# Patient Record
Sex: FEMALE | Race: WHITE | NOT HISPANIC OR LATINO | Employment: FULL TIME | URBAN - METROPOLITAN AREA
[De-identification: names, ages, dates, MRNs, and addresses within clinical notes are randomized per-mention and may not be internally consistent; named-entity substitution may affect disease eponyms.]

---

## 2017-07-30 ENCOUNTER — APPOINTMENT (OUTPATIENT)
Dept: LAB | Facility: HOSPITAL | Age: 62
End: 2017-07-30
Payer: COMMERCIAL

## 2017-07-30 ENCOUNTER — TRANSCRIBE ORDERS (OUTPATIENT)
Dept: LAB | Facility: HOSPITAL | Age: 62
End: 2017-07-30

## 2017-07-30 DIAGNOSIS — Z00.8 HEALTH EXAMINATION IN POPULATION SURVEY: Primary | ICD-10-CM

## 2017-07-30 DIAGNOSIS — Z00.8 HEALTH EXAMINATION IN POPULATION SURVEY: ICD-10-CM

## 2017-07-30 LAB
CHOLEST SERPL-MCNC: 238 MG/DL (ref 50–200)
EST. AVERAGE GLUCOSE BLD GHB EST-MCNC: 126 MG/DL
HBA1C MFR BLD: 6 % (ref 4.2–6.3)
HDLC SERPL-MCNC: 81 MG/DL (ref 40–60)
LDLC SERPL CALC-MCNC: 147 MG/DL (ref 0–100)
TRIGL SERPL-MCNC: 51 MG/DL

## 2017-07-30 PROCEDURE — 83036 HEMOGLOBIN GLYCOSYLATED A1C: CPT

## 2017-07-30 PROCEDURE — 80061 LIPID PANEL: CPT

## 2017-07-30 PROCEDURE — 36415 COLL VENOUS BLD VENIPUNCTURE: CPT

## 2017-08-24 ENCOUNTER — ALLSCRIPTS OFFICE VISIT (OUTPATIENT)
Dept: OTHER | Facility: OTHER | Age: 62
End: 2017-08-24

## 2017-08-24 DIAGNOSIS — Z12.31 ENCOUNTER FOR SCREENING MAMMOGRAM FOR MALIGNANT NEOPLASM OF BREAST: ICD-10-CM

## 2017-08-24 DIAGNOSIS — R35.0 FREQUENCY OF MICTURITION: ICD-10-CM

## 2017-08-27 ENCOUNTER — TRANSCRIBE ORDERS (OUTPATIENT)
Dept: URGENT CARE | Age: 62
End: 2017-08-27

## 2017-08-27 ENCOUNTER — OFFICE VISIT (OUTPATIENT)
Dept: URGENT CARE | Age: 62
End: 2017-08-27
Payer: COMMERCIAL

## 2017-08-27 ENCOUNTER — APPOINTMENT (OUTPATIENT)
Dept: LAB | Age: 62
End: 2017-08-27
Payer: COMMERCIAL

## 2017-08-27 DIAGNOSIS — N30.01 ACUTE CYSTITIS WITH HEMATURIA: Primary | ICD-10-CM

## 2017-08-27 DIAGNOSIS — N30.01 ACUTE CYSTITIS WITH HEMATURIA: ICD-10-CM

## 2017-08-27 PROCEDURE — S9088 SERVICES PROVIDED IN URGENT: HCPCS | Performed by: FAMILY MEDICINE

## 2017-08-27 PROCEDURE — 99203 OFFICE O/P NEW LOW 30 MIN: CPT | Performed by: FAMILY MEDICINE

## 2017-08-27 PROCEDURE — 87086 URINE CULTURE/COLONY COUNT: CPT

## 2017-08-27 PROCEDURE — 81002 URINALYSIS NONAUTO W/O SCOPE: CPT | Performed by: FAMILY MEDICINE

## 2017-08-27 PROCEDURE — 87077 CULTURE AEROBIC IDENTIFY: CPT

## 2017-08-27 PROCEDURE — 87186 SC STD MICRODIL/AGAR DIL: CPT

## 2017-08-29 LAB — BACTERIA UR CULT: NORMAL

## 2017-08-31 ENCOUNTER — GENERIC CONVERSION - ENCOUNTER (OUTPATIENT)
Dept: OTHER | Facility: OTHER | Age: 62
End: 2017-08-31

## 2017-10-18 ENCOUNTER — GENERIC CONVERSION - ENCOUNTER (OUTPATIENT)
Dept: OTHER | Facility: OTHER | Age: 62
End: 2017-10-18

## 2017-10-31 ENCOUNTER — LAB REQUISITION (OUTPATIENT)
Dept: LAB | Facility: HOSPITAL | Age: 62
End: 2017-10-31
Payer: COMMERCIAL

## 2017-10-31 DIAGNOSIS — N39.0 URINARY TRACT INFECTION: ICD-10-CM

## 2017-10-31 PROCEDURE — 87086 URINE CULTURE/COLONY COUNT: CPT | Performed by: INTERNAL MEDICINE

## 2017-11-01 LAB — BACTERIA UR CULT: NORMAL

## 2017-11-28 ENCOUNTER — HOSPITAL ENCOUNTER (OUTPATIENT)
Dept: RADIOLOGY | Age: 62
Discharge: HOME | End: 2017-11-28
Payer: COMMERCIAL

## 2017-11-28 DIAGNOSIS — Z12.31 ENCOUNTER FOR SCREENING MAMMOGRAM FOR MALIGNANT NEOPLASM OF BREAST: ICD-10-CM

## 2017-11-28 PROCEDURE — G0202 SCR MAMMO BI INCL CAD: HCPCS

## 2018-01-13 NOTE — PROGRESS NOTES
Assessment    1  Lichen sclerosus (535 8) (L90 0)   2  Encounter for gynecological examination without abnormal finding (V72 31) (Z01 419)    Plan  Encounter for gynecological examination without abnormal finding    · Osphena 60 MG Oral Tablet; Take 1 tablet daily   Rx By: Alayna Silver; Dispense: 90 Days ; #:90 Tablet; Refill: 3; For: Encounter for gynecological examination without abnormal finding; DILCIA = N; Verified Transmission to [address]; Last Updated By: System [facility]; 8/24/2017 10:28:57 AM  Encounter for routine gynecological examination    · Estrace 0 1 MG/GM Vaginal Cream   Rx By: Alayna Silver; Dispense: 90 Days ; #:2 X 42 5 GM Tube; Refill: 3; For: Encounter for routine gynecological examination; DILCIA = N; Verified Transmission to [address]; Last Updated By: Reyes Rawls; 8/24/2017 9:54:41 AM  Encounter for screening mammogram for malignant neoplasm of breast    · * MAMMO SCREENING BILATERAL W CAD; Status:Active; Requested for:79Rio6448;    Perform:[facility]; PDH:[id number]; Ordered;  For:Encounter for screening mammogram for malignant neoplasm of breast; Ordered By:Mario Umanzor;  Lichen sclerosus    · Clobetasol Propionate 0 05 % External Ointment; APPLY AND GENTLY MASSAGE  INTO AFFECTED AREA(S) TWICE DAILY   Rx By: Alayna Silver; Dispense: 90 Days ; #:1 X 45 GM Tube; Refill: 0; For: Lichen sclerosus; DILCIA = N; Verified Transmission to [address]; Last Updated By: System [employer]; 8/24/2017 10:31:03 AM   · Follow-up visit in 3 weeks Evaluation and Treatment  Follow-up  Status: Complete  Done:  91OSG6584   Ordered; For: Lichen sclerosus; Ordered By: Alayna Silver Performed:  Due: 22LLQ0894; Last Updated By: Stephanie Mccoy; 8/25/2017 9:04:50 AM  Postmenopausal disorder    · Premarin 0 625 MG/GM Vaginal Cream   Rx By: Paramjit De Anda; Dispense: 1 Days ; #:1 GM; Refill: 0; For: Postmenopausal disorder; DILCIA = N; Dispense Sample;  Last Updated By: Viji Pham; 8/24/2017 9:55:07 AM   · Sertraline HCl - 50 MG Oral Tablet   Rx By: Nat Benson; Dispense: 90 Days ; #:90 Tablet; Refill: 1; For: Postmenopausal disorder; DILCIA = N; Verified Transmission to [facility]; Last Updated By: Viji Pham; 8/24/2017 9:55:10 AM    Discussion/Summary  healthy adult female the risks and benefits of cervical cancer screening were discussed cervical cancer screening is current Breast cancer screening: the risks and benefits of breast cancer screening were discussed, monthly self breast exam was advised and mammogram has been ordered  Colorectal cancer screening: the risks and benefits of colorectal cancer screening were discussed and colorectal cancer screening is current  Advice and education were given regarding weight bearing exercise, calcium supplements and vitamin D supplements  She will use clobetasol ointment BID x 3 weeks and will RTO in 3 weeks for recheck of 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  She was cautioned about the fact that lichen sclerosis increases the risk of vulvar cancer which needs to be screened for regularly  Chief Complaint  Pt is here for her yearly exam, she has no complaints  History of Present Illness  HPI: 65year old white female here for yearly exam, no complaints  She ran out of Sapna Sor about two weeks ago and can already feel the difference  She would like to restart immediately  She is postmenopausal and has had no bleeding  She is infrequently sexually active without discomfort while using Osphena  GYN HM, Adult Female  Gabrielle Blanche: The patient is being seen for a health maintenance evaluation  The last health maintenance visit was 1 year(s) ago  General Health: The patient's health since the last visit is described as good  Lifestyle:  She consumes a diverse and healthy diet  She has weight concerns  She exercises regularly  She exercises 3 or more times per week   She does not use tobacco  She consumes alcohol  She reports occasional alcohol use  She denies drug use  Reproductive health: the patient is postmenopausal   she is sexually active  pregnancy history: G 3P 2, 2(miscarriages: 1 )  Screening: Cervical cancer screening includes a pap smear performed 2015,neg  and human papilloma virus screening performed 2015,neg  Breast cancer screening includes a mammogram performed 2016, WNL  Colorectal cancer screening includes a colonoscopy performed within the past ten years  Metabolic screening includes DEXA performed gaby 10/30/2013, WNL  Active Problems    1  Bladder polyp (236 7) (D41 4)   2  Encounter for gynecological examination without abnormal finding (V72 31) (Z01 419)   3  Encounter for screening mammogram for malignant neoplasm of breast (V76 12)   (Z12 31)   4  Postmenopausal disorder (627 9) (N95 9)   5  Screening for human papillomavirus (HPV) (V73 81) (Z11 51)   6   Sore throat (462) (J02 9)    Past Medical History    · Acute pharyngitis (462) (J02 9)   · History of Anxiety (300 00) (F41 9)   · History of Asymptomatic menopausal state (V49 81) (Z78 0)   · History of Esophagitis, reflux (530 11) (K21 0)   · History of Previous Pregnancies Resulted In 2  Living Children   · History of Previously Pregnant With 2  Normal Vaginal Deliveries   · History of Summary Of Previous Pregnancies  2  (Total No )    Surgical History    · History of Bladder Surgery   · History of Cholecystectomy   · History of Diagnostic Cystoscopy   · History of Neuroplasty Median Nerve At Carpal Tunnel   · History of Oral Surgery Tooth Extraction   · History of Ovarian Cystectomy Left   · History of Tubal Ligation    Family History  Mother    · Family history of Cervical Cancer   · Family history of Heart Disease (V17 49)  Father    · Family history of Heart Disease (V17 49)    Social History    · Being A Social Drinker   · Currently sexually active   · Daily caffeinated coffee consumption   · Exercises strenuously less than 3 times a week   ·    · Never A Smoker   · No drug use    Current Meds   1  Estrace 0 1 MG/GM Vaginal Cream; INSERT 1/4 APPLICATORFUL (1GM) VAGINALLY   TWICE WEEKLY; Therapy: 77Qlb2693 to (Evaluate:26Jun2016)  Requested for: 51KEH1267; Last   Rx:66Nzd5544 Ordered   2  Multi-Vitamin Oral Tablet; Therapy: (Recorded:72Ygp3046) to Recorded   3  Osphena 60 MG Oral Tablet; Take 1 tablet daily; Therapy: 20FZZ4466 to (Evaluate:65Kfy4243)  Requested for: 96KYX4069; Last   Rx:47Tfo0835 Ordered   4  Pepcid TABS; Therapy: (Recorded:30May2013) to Recorded   5  Premarin 0 625 MG/GM Vaginal Cream; INSERT 1 GRAM INTRAVAGINALLY DAILY; Therapy: 69Xck7641 to (Evaluate:59Pym2976); Last Rx:73Njv0902 Ordered   6  Sertraline HCl - 50 MG Oral Tablet; TAKE 1 TABLET DAILY AS DIRECTED; Therapy: 71CVS9328 to (Evaluate:81Zso3529)  Requested for: 99CYH9002; Last   Rx:14Mar2016 Ordered    Allergies    1  Levaquin TABS    Vitals   Recorded: 45ZGG3196 32:95LE   Systolic 534, LUE, Sitting   Diastolic 78, LUE, Sitting   Height 5 ft 3 5 in   Weight 166 lb    BMI Calculated 28 94   BSA Calculated 1 8   LMP      Physical Exam    Constitutional   General appearance: No acute distress, well appearing and well nourished  Neck   Neck: Normal, supple, trachea midline, no masses  Genitourinary   External genitalia: Abnormal   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; clitoris is nearly completely obliterated  There is a patch of leukoplakia above the clitoris at the junction of the labia majora anteriorly  There is a large plaque of leukoplakia at the posterior introitus and this extends to the perianal area  There is a small fissure at the posterior introitus  Vagina: Normal, no lesions or dryness appreciated  Urethra: Normal     Urethral meatus: Normal     Bladder: Normal, soft, non-tender and no prolapse or masses appreciated      Cervix: Normal, no palpable masses  Uterus: Normal, non-tender, not enlarged, and no palpable masses  Adnexa/parametria: Normal, non-tender and no fullness or masses appreciated  Chest   Breasts: Normal and no dimpling or skin changes noted  Abdomen   Abdomen: Normal, non-tender, and no organomegaly noted  Lymphatic   Palpation of lymph nodes in neck, axillae, groin and/or other locations: No lymphadenopathy or masses noted  Results/Data  (1) HEMOGLOBIN A1C 92ZIT8513 10:19AM [employer]     Test Name Result Flag Reference   HEMOGLOBIN A1C 6 0 %  4 2-6 3   EST  AVG  GLUCOSE 126 mg/dl       (1) LIPID PANEL, FASTING 41UDW8328 10:19AM [employer]     Test Name Result Flag Reference   CHOLESTEROL 238 mg/dL H    HDL,DIRECT 81 mg/dL H 40-60   Specimen collection should occur prior to Metamizole administration due to the potential for falsely depressed results  LDL CHOLESTEROL CALCULATED 147 mg/dL H 0-100   This is a fasting blood test  Water,black tea or black  coffee only after 9:00pm the night before test  Drink 2 glasses of water the morning of test         Triglyceride:         Normal              <150 mg/dl       Borderline High    150-199 mg/dl       High               200-499 mg/dl       Very High          >499 mg/dl  Cholesterol:         Desirable        <200 mg/dl      Borderline High  200-239 mg/dl      High             >239 mg/dl  HDL Cholesterol:        High    >59 mg/dL      Low     <41 mg/dL  LDL CALCULATED:    This screening LDL is a calculated result  It does not have the accuracy of the Direct Measured LDL in the monitoring of patients with hyperlipidemia and/or statin therapy  Direct Measure LDL (NBI139) must be ordered separately in these patients  TRIGLYCERIDES 51 mg/dL  <=150   Specimen collection should occur prior to N-Acetylcysteine or Metamizole administration due to the potential for falsely depressed results         Future Appointments    Date/Time Provider Specialty Site 09/12/2017 08:20 AM Alayna Silver [facility]etric[facility]/Gynecology [facility]     Signatures   Electronically signed by : Edd Tee [facility];  Aug 24 2017 12:44PM EST                       (Author)    Electronically signed by : ELIZ Mckoy ; Aug 25 2017  2:44PM EST                       (Author)

## 2018-01-13 NOTE — MISCELLANEOUS
Message   Recorded as Task   Date: 07/21/2016 09:28 AM, Created By: Carlita Gan   Task Name: Call Back   Assigned To: Dion Liz   Regarding Patient: Nasreen Guevara, Status: Active   Comment:    Chelsi gM - 21 Jul 2016 9:28 AM     TASK CREATED  PT CALLED STATING THAT SHE RECIEVED A LETTER THAT HER OSPHENA WAS DENIED  SHE TRIED ESTRACE WITH NO RELIEF  SHE USES [facility]  HER # [phone]   Winsome  - 21 Jul 2016 9:29 AM     TASK REASSIGNED: Previously Assigned To Don Aragon - 21 Jul 2016 9:33 AM     TASK EDITED   [place]  - 21 Jul 2016 9:38 AM     TASK EDITED                 told pt what denial letter states she will try premarin, then she wants us to resubmit for approval of osphena        Active Problems    1  Bladder polyp (236 7) (D41 4)   2  Encounter for gynecological examination without abnormal finding (V72 31) (Z01 419)   3  Encounter for screening mammogram for malignant neoplasm of breast (V76 12)   (Z12 31)   4  Postmenopausal disorder (627 9) (N95 9)   5  Screening for human papillomavirus (HPV) (V73 81) (Z11 51)    Current Meds   1  Estrace 0 1 MG/GM Vaginal Cream; INSERT 1/4 APPLICATORFUL (1GM) VAGINALLY   TWICE WEEKLY; Therapy: 06Ffl0136 to (Evaluate:26Jun2016)  Requested for: 40YLJ4587; Last   Rx:62Hth4374 Ordered   2  Multi-Vitamin Oral Tablet; Therapy: (Recorded:17Etu3015) to Recorded   3  Osphena 60 MG Oral Tablet; Take 1 tablet daily; Therapy: 06LNV8414 to (Evaluate:65Okx3958)  Requested for: 86RYY1885; Last   Rx:86Qtk9173 Ordered   4  Pepcid TABS (Famotidine); Therapy: (Recorded:99Jtd6208) to Recorded   5  Sertraline HCl - 50 MG Oral Tablet; TAKE 1 TABLET DAILY AS DIRECTED; Therapy: 48EUD2434 to (Evaluate:44Iqo4780)  Requested for: 52ZME4462; Last   Rx:07Gpd3993 Ordered    Allergies    1   Levaquin TABS    Signatures   Electronically signed by : Jenn Wen, ; Jul 21 2016  9:40AM EST                       (Author)

## 2018-01-14 VITALS
HEIGHT: 64 IN | WEIGHT: 166 LBS | SYSTOLIC BLOOD PRESSURE: 118 MMHG | BODY MASS INDEX: 28.34 KG/M2 | DIASTOLIC BLOOD PRESSURE: 78 MMHG

## 2018-01-15 NOTE — MISCELLANEOUS
Message   Recorded as Task   Date: 08/30/2017 09:44 AM, Created By: Romina River   Task Name: Go to Result   Assigned To: Go Iglesias   Regarding Patient: Sabina Agent, Status: In Progress   Comment:    JuanpabloMarva garcia - 30 Aug 2017 9:44 AM     TASK CREATED  Please let Tracey Chaney know that her urine culture just came back  It shows E  coli which is resistant to only one antibiotic - Bactrim - which is the antibiotic that urgent care placed her on  Please have her change her antibiotic to Macrobid 100mg po BID x 7 days  She was rather unhappy when I talked her with her on Monday, feeling that my student or myself caused her UTI because this occurred later in the day after her pelvic exam - this amount of bacteria could not have possibly collected in a few hours after an exam, so I still believe that her exam did not cause this UTI  Anthony Gagnon - 30 Aug 2017 9:51 AM     TASK IN PROGRESS   Anthony Gagnon - 30 Aug 2017 9:56 AM     TASK EDITED  lmtcb   Anthony Gagnon - 31 Aug 2017 7:12 AM     TASK EDITED  Pt went to pcp yesterday and he put her on Keflex  Pt fine with that        Active Problems    1  Bladder polyp (236 7) (D41 4)   2  Encounter for gynecological examination without abnormal finding (V72 31) (Z01 419)   3  Encounter for screening mammogram for malignant neoplasm of breast (V76 12)   (Z12 31)   4  Lichen sclerosus (574 4) (L90 0)   5  Postmenopausal disorder (627 9) (N95 9)   6  Screening for human papillomavirus (HPV) (V73 81) (Z11 51)   7  Sore throat (462) (J02 9)   8  Urinary tract infection (599 0) (N39 0)    Current Meds   1  Clobetasol Propionate 0 05 % External Ointment; APPLY AND GENTLY MASSAGE INTO   AFFECTED AREA(S) TWICE DAILY; Therapy: 38Ppm2171 to (Evaluate:22Nov2017)  Requested for: 28Bxh8254; Last   Rx:20Rid8117 Ordered   2  Multi-Vitamin Oral Tablet; Therapy: (Recorded:39Ahh1871) to Recorded   3  Osphena 60 MG Oral Tablet; Take 1 tablet daily;    Therapy: 94ZQR3962 to (Evaluate:42Lwg5453)  Requested for: 66Iaq9249; Last   Rx:92Crn6022 Ordered   4  Pepcid TABS (Famotidine); Therapy: (Recorded:41Wff0767) to Recorded   5  Pyridium 100 MG Oral Tablet; TAKE 1 TABLET 3 TIMES DAILY AFTER MEALS; Therapy: 21Ayu9742 to (Evaluate:16Tdf3327)  Requested for: 86Idm2611; Last   Rx:28Sjk8140 Ordered   6  Sulfamethoxazole-Trimethoprim 800-160 MG Oral Tablet (Bactrim DS); TAKE 1 TABLET   TWICE DAILY WITH FOOD; Therapy: 37Ewg4854 to (Evaluate:43Ezx3237)  Requested for: 81Pbu0831; Last   Rx:33Rfn2408 Ordered    Allergies    1  Levaquin TABS    Signatures   Electronically signed by :  Janet Jha, ; Aug 31 2017  7:12AM EST                       (Author)

## 2018-01-16 NOTE — MISCELLANEOUS
Message   Recorded as Task   Date: 03/14/2016 10:22 AM, Created By: Maggy Duffy   Task Name: Med Renewal Request   Assigned To: Solomon Nickerson   Regarding Patient: Sylvain Villagomez, Status: In Progress   Sonya Butler - 14 Mar 2016 10:22 AM     TASK CREATED  Caller: Self; [phone] (Home); [phone] x,,,,, (Work)  pt needs a refill on zoloft  pharmacy is Edson Mcgregor - 14 Mar 2016 10:24 AM     TASK IN PROGRESS   Homa Rose - 14 Mar 2016 10:55 AM     TASK EDITED  rx to ehr until 07/2016 apt        Active Problems    1  Encounter for routine gynecological examination (V72 31) (Z01 419)   2  Encounter for screening mammogram for malignant neoplasm of breast (V76 12)   (Z12 31)   3  Postmenopausal disorder (627 9) (N95 9)   4  Screening for human papillomavirus (HPV) (V73 81) (Z11 51)    Current Meds   1  Estrace 0 1 MG/GM Vaginal Cream; INSERT 1/4 APPLICATORFUL (1GM) VAGINALLY   TWICE WEEKLY; Therapy: 76Dee1770 to (Evaluate:26Jun2016)  Requested for: 82MBT4583; Last   Rx:51Zaw7535 Ordered   2  Multi-Vitamin Oral Tablet; Therapy: (Recorded:02Ypp8612) to Recorded   3  Pepcid TABS (Famotidine); Therapy: (Recorded:13Wyl6407) to Recorded   4  Sertraline HCl - 50 MG Oral Tablet (Zoloft); Take 1 tablet daily; Therapy: 09Dxk1635 to (Evaluate:26Jun2016)  Requested for: 62IVR4948; Last   Rx:24Tqf9344 Ordered    Allergies    1   No Known Drug Allergies    Plan  Postmenopausal disorder    · Sertraline HCl - 50 MG Oral Tablet; TAKE 1 TABLET DAILY AS DIRECTED    Signatures   Electronically signed by : Abraham Bernardo, ; Mar 14 2016 10:56AM EST                       (Author)

## 2018-01-17 NOTE — MISCELLANEOUS
Message   Recorded as Task   Date: 08/19/2016 09:16 AM, Created By: Argelia Barroso   Task Name: Call Back   Assigned To: Dion Liz   Regarding Patient: Olena Gauthier, Status: Active   CommentHerjeovanny Robin - 19 Aug 2016 9:16 AM     TASK CREATED  Caller: Self; [phone] (Home); [phone] x,,,,, (Work)  pt called - she said she tried premarin and its not working  She said she was supposed to call back to try and see if osphena would be covered by her insurance  please advise [phone]   Domingo Luis - 19 Aug 2016 9:27 AM     TASK EDITED                 sent to cover my meds again for a prior Bessy Cordero - 19 Aug 2016 2:41 PM     TASK EDITED                 pt said she tried [place], estrace and premarin,asking for peer to peer review to get osphena approved for pt, they will call Luis Eduardo Singh - 19 Aug 2016 3:15 PM     TASK EDITED                 approved        Active Problems    1  Bladder polyp (236 7) (D41 4)   2  Encounter for gynecological examination without abnormal finding (V72 31) (Z01 419)   3  Encounter for screening mammogram for malignant neoplasm of breast (V76 12)   (Z12 31)   4  Postmenopausal disorder (627 9) (N95 9)   5  Screening for human papillomavirus (HPV) (V73 81) (Z11 51)    Current Meds   1  Estrace 0 1 MG/GM Vaginal Cream; INSERT 1/4 APPLICATORFUL (1GM) VAGINALLY   TWICE WEEKLY; Therapy: 73Hbt3906 to (Evaluate:26Jun2016)  Requested for: 61JUU9255; Last   Rx:44Hmu3797 Ordered   2  Multi-Vitamin Oral Tablet; Therapy: (Recorded:22Pwt4663) to Recorded   3  Osphena 60 MG Oral Tablet; Take 1 tablet daily; Therapy: 21BZE8448 to (Evaluate:55Bqj6578)  Requested for: 92VDF8065; Last   Rx:30Hvr5516 Ordered   4  Pepcid TABS (Famotidine); Therapy: (Recorded:10Fnl0563) to Recorded   5  Premarin 0 625 MG/GM Vaginal Cream; INSERT 1 GRAM INTRAVAGINALLY DAILY; Therapy: 46Tmf5588 to (Evaluate:72Gbf2055); Last Rx:58Rbt9497 Ordered   6   Sertraline HCl - 50 MG Oral Tablet; TAKE 1 TABLET DAILY AS DIRECTED; Therapy: 82MON8127 to (Evaluate:98Xuf5914)  Requested for: 33YNN1373; Last   Rx:14Mar2016 Ordered    Allergies    1   Levaquin TABS    Signatures   Electronically signed by : Julia Sweet, ; Aug 19 2016  3:15PM EST                       (Author)

## 2018-01-18 NOTE — MISCELLANEOUS
Message  Return to work or school:    She is able to return to work on  12/29/2016            Signatures   Electronically signed by : Leah Russell DO; Dec 26 2016 10:47AM EST                       (Author)

## 2018-01-25 DIAGNOSIS — F32.A DEPRESSION, UNSPECIFIED DEPRESSION TYPE: Primary | ICD-10-CM

## 2018-04-24 ENCOUNTER — TELEPHONE (OUTPATIENT)
Dept: BEHAVIORAL/MENTAL HEALTH CLINIC | Facility: CLINIC | Age: 63
End: 2018-04-24

## 2018-04-24 NOTE — TELEPHONE ENCOUNTER
Behavorial Health Outpatient Intake Questions    Referred by:  EMPLOYEE    Check with provider before scheduling    Are there any developmental disabilities? No    Does the patient have hearing impairment? No    Does the patient have ICM or CTT? No    Taking injectable psychiatric medications? NoIf yes, patient can not be seen here  Has the patient ever seen or currently see a psychiatrist? No If yes who/when? Has the patient ever seen or currently see a therapist? Yes If yes who/when? MARRIAGE COUNSELING 10 YRS AGO    How many visits did the pt have for previous psychiatric treatment?  History    Has the patient served in the [place] [address]? No    If yes, have you had combat services? No    Was the patient activated into federal active duty as a member of the national guard or reserve? No    Minor Child    Who has custody of the child? N/A    Is there a custody agreement? N/A    If there is a custody agreement remind parent that they must bring a copy to the first appt or they will not be seen  [facility]     Presenting Problem (in patient's words) MARRIAGE ISSUES, IS BIPOLAR,ANXIETY    Substance Abuse:No concerns of substance abuse are reported  Has the patient been seen here previously, either inpatient or outpatient? No outpatient    If seen as outpatient, what provider(s) did the patient see? N/A    A member of the patient's family has been in therapy here with NO    ACCEPTED as a patient Yes Appointment Date: 4/26/18 @ 2PM  [place]    Referred Elsewhere?  No    Primary Care Physician: Elise Hermosillo MD    PCP telephone number: [phone]    SUB: [facility]  INS: [address]  Id: [id number]   GRP:   [id number] [place] [id number] # [id number]

## 2018-05-03 ENCOUNTER — OFFICE VISIT (OUTPATIENT)
Dept: BEHAVIORAL/MENTAL HEALTH CLINIC | Facility: CLINIC | Age: 63
End: 2018-05-03
Payer: COMMERCIAL

## 2018-05-03 DIAGNOSIS — F41.9 ANXIETY: Primary | ICD-10-CM

## 2018-05-03 PROCEDURE — 90791 PSYCH DIAGNOSTIC EVALUATION: CPT | Performed by: SOCIAL WORKER

## 2018-05-03 NOTE — PSYCH
Assessment/Plan:      There are no diagnoses linked to this encounter  Subjective:      Patient ID: Lukas Putnam is a 59 y o  female  HPI: My  and I don't have intimacy- this is on me--I miss it, I gained weight, had surgery  I love him, he is a good father  I think he deserves that  I want to feel better about myself  Marriage is important to me  Pre-morbid level of function and History of Present Illness: Back on zoloft, 50 mgs  Feels like has anxiety- maybe not all of this is because of marriage   Previous Psychiatric/psychological treatment/year: 10 years ago saw marriage counselor- therapist had issues  Current Psychiatrist/Therapist: na  Outpatient and/or Partial and Other [employer] Used (CTT, ICM, VNA): na      Problem Assessment:     SOCIAL/VOCATION:  Family Constellation (include parents, relationship with each and pertinent Psych/Medical History):     No family history on file  Mother:   Very good, I was her caregiver, made many of the end of life decisions  Spouse: Maryanne Jim- pretty good at keeping himself in check, we work together, 9 yrs younger  She asked him to leave and go stay with his mom for the first time ever at [facility]'s suggestion  He sees Dr Swati Sandoval  Father:  when I was 6  This was pretty sad  He was a different dimitris  My mother's issues got the best of him  He was a drinker  Our house was full of chaos  Children: [address], 33, lives in [place], went to [place], hosptilized as a sophomore, manic, dx with Bipolar, mom not sure that fit  Not on meds  No other episods  Sibling:  has 3  Children: 25- Barbara- finishing msw, with dimitris for 6 yrs, plans to   Treated for adhd when younger  Also anxiety  Corine Landeros relates best to good friend, [employer], from work( and [employer])  she lives with   she does not live alone       Domestic Violence: na    Additional Comments related to family/relationships/peer support:  27 years   Mom bipolar- watched mom get ect in er in [facility]!!!!!!!!  man who is bipolar  Recently, not doing a good job managing his bipolar  Wants marital tx, start with her first    feels less able to deal than ever in past "where is savanah?"  She is getting away from herself  Who am I? 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 Sleeps and eats betterher primary language is [place]  Preferred language is [place]  Ethnic considerations are na  Religions affiliations and level of involvement comes from very Denominational family  Jehovah's witness- the split in her [employer] cause a void in her--first presby  People turned on each other    Does spirituality help you cope? No - not right now    FUNCTIONAL STATUS: There has been a recent change in Fritz Cohen ability to do the following: na    Level of Assistance Needed/By Whom?:hoa Barnesie Noel learns best by  na    SUBSTANCE ABUSE ASSESSMENT: no substance abuse    Substance/Route/Age/Amount/Frequency/Last Use: 2 glasses of wine per week    DETOX HISTORY: na    Previous detox/rehab treatment: na      HEALTH ASSESSMENT: LMP: na    LEGAL: No Mental Health Advance Directive or Power of  on file    Prenatal History: N/A    Delivery History: N/A    Developmental Milestones: N/A  Temperament as an infant was N/A  Temperament as a toddler was N/A  Temperament at school age was N/A  Temperament as a teenager was N/A      Risk Assessment:   The following ratings are based on my N/A    Risk of Harm to Self:   Demographic risk factors include   Historical Risk Factors include na  Recent Specific Risk Factors include na  Additional Factors for a Child or Adolescent na    Risk of Harm to Others:   Demographic Risk Factors include na  Historical Risk Factors include na  Recent Specific Risk Factors include na    Access to Weapons:   Zachary Woodson has access to the following weapons: na  The following steps have been taken to ensure weapons are properly secured: na    Based on the above information, the client presents the following risk of harm to self or others:  na    The following interventions are recommended:   no intervention changes    Notes regarding this Risk Assessment: na        Review Of Systems:     Mood Anxiety and Depression   Behavior Normal    Thought Content Normal   General Relationship Problems   Personality Normal   Other Psych Symptoms Normal   Constitutional Normal   ENT Normal   Cardiovascular Normal    Respiratory Normal    Gastrointestinal gall bladder removal   Genitourinary bladder surgery   Musculoskeletal Negative   Integumentary Normal    Neurological Normal    Endocrine Normal          Mental status:  Appearance calm and cooperative    Mood mood appropriate   Affect affect appropriate    Speech a normal rate   Thought Processes normal thought processes   Hallucinations no hallucinations present    Thought Content no delusions   Abnormal Thoughts no suicidal thoughts  and no homicidal thoughts    Orientation  oriented to person and place and time   Remote Memory short term memory intact and long term memory intact   Attention Span concentration intact   Intellect Appears to be Above Average Intelligence   Fund of Knowledge displays adequate knowledge of current events   Insight Insight intact   Judgement judgment was intact   Muscle Strength Normal gait    Language no difficulty naming common objects   Pain none   Pain Scale 0

## 2018-05-30 ENCOUNTER — OFFICE VISIT (OUTPATIENT)
Dept: BEHAVIORAL/MENTAL HEALTH CLINIC | Facility: CLINIC | Age: 63
End: 2018-05-30
Payer: COMMERCIAL

## 2018-05-30 DIAGNOSIS — F41.1 GAD (GENERALIZED ANXIETY DISORDER): ICD-10-CM

## 2018-05-30 PROCEDURE — 90834 PSYTX W PT 45 MINUTES: CPT | Performed by: SOCIAL WORKER

## 2018-05-30 NOTE — PSYCH
Psychotherapy Provided: Individual Psychotherapy 50 minutes     Length of time in session: 50 minutes, follow up in 2 week    Goals addressed in session: Goal 1, Goal 2 and Goal 3      Pain:      none    0    Current suicide risk : Low     D:  Met with Lucia Brian for Individual Therapy session  Treatment Plan goals were developed  Lucia Brian reported feeling much better since first session as feelings re: anxiety were normalized  A:  Lucia Brian appears to be looking forward to therapy sessions and addressing issues  P:Sessions will remain individual for now, marital to follow  Behavioral Health Treatment Plan [facility]: Diagnosis and Treatment Plan explained to Obed Lubin relates understanding diagnosis and is agreeable to Treatment Plan   Yes

## 2018-05-30 NOTE — PSYCH
Mattie Jmaes  1955       Date of Initial Treatment Plan: 5/30/18   Date of Current Treatment Plan: 05/30/18    Treatment Plan Number 1     Strengths/Personal Resources for Self Care: I am always even keeled in stressful situations, optimistic, humorous, pretty loyal, dependable, a good teacher, a good mentor, a good resource, a good friend, champion of the underdog! Diagnosis:   DAVID    Area of Needs: I have a void where my Buddhism and my spiritual community was  I have felt less appreciated by my adult children over the last few years  There is a void there  Long Term Goal 1: AI want to get back to Buddhism    Target Date:9/30/18  Completion Date: na         Short Term Objectives for Goal 1: AI will return to my old Buddhism to see if there is still a sense of community that I miss  Long Term Goal 2: I want to be healthier    Target Date: 9/30/18  Completion Date: N/A    Short Term Objectives for Goal 2: AI will work with my  to increase the intensidty of my workouts  Long Term Goal # 3: I want to improve my marriage     Target Date: 9/30/18  Completion Date: N/A    Short Term Objectives for Goal 3: AI will do one thing each day to let him know that I love him  GOAL 1: Modality: Individual 1x per month   Completion Date 9/30/18 and The person(s) responsible for carrying out the plan is  Demi    GOAL 2: Modality:  Individual 1x per month   Completion Date 9/30/18 and The person(s) responsible for carrying out the plan is  Demi    GOAL 3: Modality: Individual 1x per month   Completion Date 9/30/18 and The person(s) responsible for carrying out the plan is  [address] [place] [address] Avenue: Diagnosis and Treatment Plan explained to Alissa Ward relates understanding diagnosis and is agreeable to Treatment Plan         Client Comments : Please share your thoughts, feelings, need and/or experiences regarding your treatment plan: __________________________________________________________________    __________________________________________________________________    __________________________________________________________________    __________________________________________________________________    _______________________________________                Patient signature, Date Time: __________________________________________             Physician cosigner signature, Date, Time: ________________________________

## 2018-06-21 ENCOUNTER — OFFICE VISIT (OUTPATIENT)
Dept: BEHAVIORAL/MENTAL HEALTH CLINIC | Facility: CLINIC | Age: 63
End: 2018-06-21
Payer: COMMERCIAL

## 2018-06-21 DIAGNOSIS — F41.1 GAD (GENERALIZED ANXIETY DISORDER): ICD-10-CM

## 2018-06-21 PROCEDURE — 90834 PSYTX W PT 45 MINUTES: CPT | Performed by: SOCIAL WORKER

## 2018-06-21 NOTE — PSYCH
Psychotherapy Provided: Individual Psychotherapy 50 minutes     Length of time in session: 50 minutes, follow up in 2 week    Goals addressed in session: Goal 1, Goal 2 and Goal 3      Pain:      none    0    Current suicide risk : Low     D:  Met with Lorraine Swathiestella for Individual Therapy session  Lorraine Beltran spoke about her feelings re: her marriage of 27 years, her progress on her Treatment Plan goals since her last session  A:  Lorraine Beltran demonstrated considerable insight and has put effort into addressing her goals  She was receptive to this worker's support and feedback  P:Sessions will used to provide support and encourage continued growth  Behavioral Health Treatment Plan [facility]: Diagnosis and Treatment Plan explained to Jacob Deal relates understanding diagnosis and is agreeable to Treatment Plan   Yes

## 2018-07-03 ENCOUNTER — APPOINTMENT (OUTPATIENT)
Dept: LAB | Facility: HOSPITAL | Age: 63
End: 2018-07-03
Payer: COMMERCIAL

## 2018-07-03 ENCOUNTER — TRANSCRIBE ORDERS (OUTPATIENT)
Dept: LAB | Facility: HOSPITAL | Age: 63
End: 2018-07-03

## 2018-07-03 DIAGNOSIS — Z00.8 HEALTH EXAMINATION IN POPULATION SURVEY: Primary | ICD-10-CM

## 2018-07-03 LAB
CHOLEST SERPL-MCNC: 234 MG/DL (ref 50–200)
EST. AVERAGE GLUCOSE BLD GHB EST-MCNC: 126 MG/DL
HBA1C MFR BLD: 6 % (ref 4.2–6.3)
HDLC SERPL-MCNC: 69 MG/DL (ref 40–60)
LDLC SERPL CALC-MCNC: 147 MG/DL (ref 0–100)
NONHDLC SERPL-MCNC: 165 MG/DL
TRIGL SERPL-MCNC: 89 MG/DL

## 2018-07-03 PROCEDURE — 80061 LIPID PANEL: CPT

## 2018-07-03 PROCEDURE — 36415 COLL VENOUS BLD VENIPUNCTURE: CPT

## 2018-07-03 PROCEDURE — 83036 HEMOGLOBIN GLYCOSYLATED A1C: CPT

## 2018-08-14 ENCOUNTER — TELEPHONE (OUTPATIENT)
Dept: GASTROENTEROLOGY | Facility: CLINIC | Age: 63
End: 2018-08-14

## 2018-08-14 PROBLEM — Z12.11 COLON CANCER SCREENING: Status: ACTIVE | Noted: 2018-08-14

## 2018-08-14 NOTE — TELEPHONE ENCOUNTER
Karina Christianson  1955  [address] [place] [address]   [phone]  Cell Phone    Screened by: Gabino Scott ]    Referring Dr :Ritesh    Pre- Screening:   Has patient been referred for a routine screening Colonoscopy? yes  Is the patient over 48years of age? yes    If the answer is YES to both questions, proceed to the medical questions  Do you have any of the following symptoms? Have you had a coronary or vascular stent within the last year? no    Have you had a heart attack or stroke in the last 6 months? no    Have you had intestinal surgery in the last 3 months? no    Do you have problems with:    Do you use:  Oxygen no  CPAP/BiPAP no    Have you been hospitalized in the last Month? no    Have you been diagnosed with a bleeding disorder or anemia? no    Have you had chest pain (angina) or breathing problems  (COPD) in the last 3 months? no     Do you have any difficulty walking up a flight of stairs? no    Have you had Kidney failure or insufficiency? no    Have you had heart valve surgery? no    Are you confined to a wheelchair? no    Do you take     Do you take insulin for Diabetes no  Name of medication:    : If patient answers NO to medical questions, then schedule procedure  If patient answers YES to medical questions, then schedule office appointment  Previous Colonoscopy Yes   (if yes) Date and Facility of last colonoscopy? Patient scheduled for procedure:   Scheduled by:     Time:   Provider:   Location:     Insurance:   Referral Required? Were instructions Mailed? Were instructions sent to [employer]:   Was the prep sent to Pharmacy?    Comments: [  ]

## 2018-08-14 NOTE — TELEPHONE ENCOUNTER
ABBEY scheduled with Dr Miller at [place] on 10/17/2018  I gave pt verbal instructons/mailed  Pt requested Miralax/Dulcolax Prep      Per Jerald Francisco schedule her on 10/17/2018 in [place]

## 2018-08-16 ENCOUNTER — OFFICE VISIT (OUTPATIENT)
Dept: BEHAVIORAL/MENTAL HEALTH CLINIC | Facility: CLINIC | Age: 63
End: 2018-08-16
Payer: COMMERCIAL

## 2018-08-16 DIAGNOSIS — F41.1 GAD (GENERALIZED ANXIETY DISORDER): ICD-10-CM

## 2018-08-16 PROCEDURE — 90834 PSYTX W PT 45 MINUTES: CPT | Performed by: SOCIAL WORKER

## 2018-08-22 NOTE — PSYCH
Psychotherapy Provided: Individual Psychotherapy 50 minutes     Length of time in session: 50 minutes, follow up in 2 week    Goals addressed in session: Goal 1, Goal 2 and Goal 3      Pain:      none    0    Current suicide risk : Low     D:  Radha Bentley spoke today  Re: how much better she has been feeling her marriage of 27 years  She reported that she finds it very helpful to learn that her thoughts are the "norm" and not different from others  A:  Radha Bentley once again demonstrated considerable insight and has put ongoing  effort into addressing her goals  She remains receptive to this worker's support and feedback  P:Sessions will used to provide support and encourage continued growth  Behavioral Health Treatment Plan [facility]: Diagnosis and Treatment Plan explained to Vinnie Roth relates understanding diagnosis and is agreeable to Treatment Plan   Yes

## 2018-08-23 ENCOUNTER — LAB REQUISITION (OUTPATIENT)
Dept: LAB | Facility: HOSPITAL | Age: 63
End: 2018-08-23
Payer: COMMERCIAL

## 2018-08-23 DIAGNOSIS — B34.9 VIRAL INFECTION: ICD-10-CM

## 2018-08-23 LAB
FLUAV AG SPEC QL: NORMAL
FLUBV AG SPEC QL: NORMAL
RSV B RNA SPEC QL NAA+PROBE: NORMAL

## 2018-08-23 PROCEDURE — 87631 RESP VIRUS 3-5 TARGETS: CPT | Performed by: INTERNAL MEDICINE

## 2018-08-29 ENCOUNTER — TRANSCRIBE ORDERS (OUTPATIENT)
Dept: ADMINISTRATIVE | Age: 63
End: 2018-08-29

## 2018-08-29 ENCOUNTER — APPOINTMENT (OUTPATIENT)
Dept: RADIOLOGY | Age: 63
End: 2018-08-29
Payer: COMMERCIAL

## 2018-08-29 DIAGNOSIS — J04.10 CATARRHAL TRACHEITIS: Primary | ICD-10-CM

## 2018-08-29 DIAGNOSIS — J04.10 CATARRHAL TRACHEITIS: ICD-10-CM

## 2018-08-29 PROCEDURE — 71046 X-RAY EXAM CHEST 2 VIEWS: CPT

## 2018-10-15 ENCOUNTER — TRANSCRIBE ORDERS (OUTPATIENT)
Dept: ADMINISTRATIVE | Facility: HOSPITAL | Age: 63
End: 2018-10-15

## 2018-10-15 DIAGNOSIS — Z13.820 OSTEOPOROSIS SCREENING: ICD-10-CM

## 2018-10-15 DIAGNOSIS — Z00.01 ENCOUNTER FOR GENERAL ADULT MEDICAL EXAMINATION WITH ABNORMAL FINDINGS: Primary | ICD-10-CM

## 2018-10-16 ENCOUNTER — ANESTHESIA EVENT (OUTPATIENT)
Dept: GASTROENTEROLOGY | Facility: HOSPITAL | Age: 63
End: 2018-10-16
Payer: COMMERCIAL

## 2018-10-16 ENCOUNTER — APPOINTMENT (OUTPATIENT)
Dept: LAB | Facility: HOSPITAL | Age: 63
End: 2018-10-16
Attending: INTERNAL MEDICINE
Payer: COMMERCIAL

## 2018-10-16 DIAGNOSIS — Z00.01 ENCOUNTER FOR GENERAL ADULT MEDICAL EXAMINATION WITH ABNORMAL FINDINGS: ICD-10-CM

## 2018-10-16 DIAGNOSIS — Z13.820 OSTEOPOROSIS SCREENING: ICD-10-CM

## 2018-10-16 LAB
25(OH)D3 SERPL-MCNC: 17.3 NG/ML (ref 30–100)
ALBUMIN SERPL BCP-MCNC: 3.9 G/DL (ref 3.5–5)
ALP SERPL-CCNC: 90 U/L (ref 46–116)
ALT SERPL W P-5'-P-CCNC: 30 U/L (ref 12–78)
ANION GAP SERPL CALCULATED.3IONS-SCNC: 7 MMOL/L (ref 4–13)
AST SERPL W P-5'-P-CCNC: 25 U/L (ref 5–45)
BACTERIA UR QL AUTO: NORMAL /HPF
BASOPHILS # BLD AUTO: 0.06 THOUSANDS/ΜL (ref 0–0.1)
BASOPHILS NFR BLD AUTO: 1 % (ref 0–1)
BILIRUB SERPL-MCNC: 0.78 MG/DL (ref 0.2–1)
BILIRUB UR QL STRIP: NEGATIVE
BUN SERPL-MCNC: 16 MG/DL (ref 5–25)
CALCIUM SERPL-MCNC: 9.5 MG/DL (ref 8.3–10.1)
CHLORIDE SERPL-SCNC: 102 MMOL/L (ref 100–108)
CLARITY UR: CLEAR
CO2 SERPL-SCNC: 29 MMOL/L (ref 21–32)
COLOR UR: YELLOW
CREAT SERPL-MCNC: 0.87 MG/DL (ref 0.6–1.3)
EOSINOPHIL # BLD AUTO: 0.2 THOUSAND/ΜL (ref 0–0.61)
EOSINOPHIL NFR BLD AUTO: 3 % (ref 0–6)
ERYTHROCYTE [DISTWIDTH] IN BLOOD BY AUTOMATED COUNT: 13.5 % (ref 11.6–15.1)
GFR SERPL CREATININE-BSD FRML MDRD: 72 ML/MIN/1.73SQ M
GLUCOSE P FAST SERPL-MCNC: 81 MG/DL (ref 65–99)
GLUCOSE UR STRIP-MCNC: NEGATIVE MG/DL
HCT VFR BLD AUTO: 43 % (ref 34.8–46.1)
HGB BLD-MCNC: 14.2 G/DL (ref 11.5–15.4)
HGB UR QL STRIP.AUTO: NEGATIVE
HYALINE CASTS #/AREA URNS LPF: NORMAL /LPF
IMM GRANULOCYTES # BLD AUTO: 0.02 THOUSAND/UL (ref 0–0.2)
IMM GRANULOCYTES NFR BLD AUTO: 0 % (ref 0–2)
KETONES UR STRIP-MCNC: NEGATIVE MG/DL
LEUKOCYTE ESTERASE UR QL STRIP: NEGATIVE
LYMPHOCYTES # BLD AUTO: 2.25 THOUSANDS/ΜL (ref 0.6–4.47)
LYMPHOCYTES NFR BLD AUTO: 36 % (ref 14–44)
MCH RBC QN AUTO: 29.3 PG (ref 26.8–34.3)
MCHC RBC AUTO-ENTMCNC: 33 G/DL (ref 31.4–37.4)
MCV RBC AUTO: 89 FL (ref 82–98)
MONOCYTES # BLD AUTO: 0.31 THOUSAND/ΜL (ref 0.17–1.22)
MONOCYTES NFR BLD AUTO: 5 % (ref 4–12)
NEUTROPHILS # BLD AUTO: 3.37 THOUSANDS/ΜL (ref 1.85–7.62)
NEUTS SEG NFR BLD AUTO: 55 % (ref 43–75)
NITRITE UR QL STRIP: NEGATIVE
NON-SQ EPI CELLS URNS QL MICRO: NORMAL /HPF
NRBC BLD AUTO-RTO: 0 /100 WBCS
PH UR STRIP.AUTO: 5 [PH] (ref 4.5–8)
PLATELET # BLD AUTO: 295 THOUSANDS/UL (ref 149–390)
PMV BLD AUTO: 10.6 FL (ref 8.9–12.7)
POTASSIUM SERPL-SCNC: 4.2 MMOL/L (ref 3.5–5.3)
PROT SERPL-MCNC: 7.9 G/DL (ref 6.4–8.2)
PROT UR STRIP-MCNC: NEGATIVE MG/DL
RBC # BLD AUTO: 4.85 MILLION/UL (ref 3.81–5.12)
RBC #/AREA URNS AUTO: NORMAL /HPF
SODIUM SERPL-SCNC: 138 MMOL/L (ref 136–145)
SP GR UR STRIP.AUTO: 1 (ref 1–1.03)
TSH SERPL DL<=0.05 MIU/L-ACNC: 2.63 UIU/ML (ref 0.36–3.74)
UROBILINOGEN UR QL STRIP.AUTO: 0.2 E.U./DL
WBC # BLD AUTO: 6.21 THOUSAND/UL (ref 4.31–10.16)
WBC #/AREA URNS AUTO: NORMAL /HPF

## 2018-10-16 PROCEDURE — 36415 COLL VENOUS BLD VENIPUNCTURE: CPT

## 2018-10-16 PROCEDURE — 85025 COMPLETE CBC W/AUTO DIFF WBC: CPT

## 2018-10-16 PROCEDURE — 81001 URINALYSIS AUTO W/SCOPE: CPT | Performed by: INTERNAL MEDICINE

## 2018-10-16 PROCEDURE — 80053 COMPREHEN METABOLIC PANEL: CPT

## 2018-10-16 PROCEDURE — 82306 VITAMIN D 25 HYDROXY: CPT

## 2018-10-16 PROCEDURE — 84443 ASSAY THYROID STIM HORMONE: CPT

## 2018-10-16 NOTE — ANESTHESIA PREPROCEDURE EVALUATION
Review of Systems/Medical History  Patient summary reviewed  Chart reviewed  No history of anesthetic complications     Cardiovascular  Negative cardio ROS    Pulmonary  Negative pulmonary ROS        GI/Hepatic    GERD (Tums prn) ,        Negative  ROS        Endo/Other  Negative endo/other ROS      GYN  Negative gynecology ROS          Hematology  Negative hematology ROS      Musculoskeletal  Negative musculoskeletal ROS        Neurology  Negative neurology ROS      Psychology   Anxiety,              Physical Exam    Airway    Mallampati score: II  TM Distance: >3 FB       Dental   Comment: Upper front CAP,     Cardiovascular  Comment: Negative ROS, Rhythm: regular,     Pulmonary  Breath sounds clear to auscultation,     Other Findings        Anesthesia Plan  ASA Score- 2     Anesthesia Type- IV sedation with anesthesia with ASA Monitors  Additional Monitors:   Airway Plan:         Plan Factors-    Induction- intravenous  Postoperative Plan-     Informed Consent- Anesthetic plan and risks discussed with patient  I personally reviewed this patient with the CRNA  Discussed and agreed on the Anesthesia Plan with the CRNA  Gregoria Luis

## 2018-10-17 ENCOUNTER — ANESTHESIA (OUTPATIENT)
Dept: GASTROENTEROLOGY | Facility: HOSPITAL | Age: 63
End: 2018-10-17
Payer: COMMERCIAL

## 2018-10-17 ENCOUNTER — HOSPITAL ENCOUNTER (OUTPATIENT)
Facility: HOSPITAL | Age: 63
Setting detail: OUTPATIENT SURGERY
Discharge: HOME | End: 2018-10-17
Attending: INTERNAL MEDICINE | Admitting: INTERNAL MEDICINE
Payer: COMMERCIAL

## 2018-10-17 VITALS
DIASTOLIC BLOOD PRESSURE: 65 MMHG | RESPIRATION RATE: 20 BRPM | OXYGEN SATURATION: 99 % | TEMPERATURE: 97.5 F | HEART RATE: 55 BPM | WEIGHT: 162 LBS | BODY MASS INDEX: 27.66 KG/M2 | HEIGHT: 64 IN | SYSTOLIC BLOOD PRESSURE: 103 MMHG

## 2018-10-17 PROCEDURE — 45378 DIAGNOSTIC COLONOSCOPY: CPT | Performed by: INTERNAL MEDICINE

## 2018-10-17 RX ORDER — PROPOFOL 10 MG/ML
INJECTION, EMULSION INTRAVENOUS AS NEEDED
Status: DISCONTINUED | OUTPATIENT
Start: 2018-10-17 | End: 2018-10-17

## 2018-10-17 RX ORDER — PROPOFOL 10 MG/ML
INJECTION, EMULSION INTRAVENOUS CONTINUOUS PRN
Status: DISCONTINUED | OUTPATIENT
Start: 2018-10-17 | End: 2018-10-17 | Stop reason: SURG

## 2018-10-17 RX ORDER — TERBINAFINE HYDROCHLORIDE 250 MG/1
250 TABLET ORAL DAILY
COMMUNITY
End: 2019-01-09 | Stop reason: HOSPADM

## 2018-10-17 RX ORDER — PROPOFOL 10 MG/ML
INJECTION, EMULSION INTRAVENOUS AS NEEDED
Status: DISCONTINUED | OUTPATIENT
Start: 2018-10-17 | End: 2018-10-17 | Stop reason: SURG

## 2018-10-17 RX ORDER — SODIUM CHLORIDE 9 MG/ML
50 INJECTION, SOLUTION INTRAVENOUS CONTINUOUS
Status: DISCONTINUED | OUTPATIENT
Start: 2018-10-17 | End: 2018-10-17 | Stop reason: HOSPADM

## 2018-10-17 RX ADMIN — PROPOFOL 130 MG: 10 INJECTION, EMULSION INTRAVENOUS at 11:16

## 2018-10-17 RX ADMIN — PROPOFOL 100 MCG/KG/MIN: 10 INJECTION, EMULSION INTRAVENOUS at 11:16

## 2018-10-17 RX ADMIN — SODIUM CHLORIDE 50 ML/HR: 0.9 INJECTION, SOLUTION INTRAVENOUS at 10:33

## 2018-10-17 NOTE — DISCHARGE INSTR - AVS FIRST PAGE
OPERATIVE REPORT  PATIENT NAME: Peg Chase    :  1955  MRN: [id number]  Pt Location: [facility] ROOM 01    SURGERY DATE: 10/17/2018    Surgeon(s) and Role:     * Sean Munguia MD - Primary     * Meaghan Epstein MD - Fellow    COLONOSCOPY    PROCEDURE: Colonoscopy    INDICATIONS: Screening for Colon Cancer    POST-OP DIAGNOSIS: See the impression below    SEDATION: Monitored anesthesia care, check anesthesia records    PHYSICAL EXAM:    /67   Pulse 63   Temp 97 5 °F (36 4 °C) (Oral)   Resp 14   Ht 5' 4" (1 626 m)   Wt 73 5 kg (162 lb)   SpO2 99%   BMI 27 81 kg/m²    Body mass index is 27 81 kg/m²  General: NAD  Heart: S1 & S2 normal, RRR  Lungs: CTA, No rales or rhonchi  Abdomen: Soft, nontender, nondistended, good bowel sounds    CONSENT:  Informed consent was obtained for the procedure, including sedation after explaining the risks and benefits of the procedure  Risks including but not limited to bleeding, perforation, infection, aspiration were discussed in detail  Also explained about less than 100%$ sensitivity with the exam and other alternatives  PREPARATION:   EKG tracing, pulse oximetry, blood pressure were monitored throughout the procedure  Patient was identified by myself both verbally and by visual inspection of ID band  DESCRIPTION:   Patient was placed in the left lateral decubitus position and was sedated with the above medication  Digital rectal examination was performed  The colonoscope was introduced in to the anal canal and advanced up to transverse colon  The procedure was terminated due to poor prep and inadequate visualization; findings and interventions are described below  Appropriate photodocumentation was obtained  The quality of the colonic preparation was poor  FINDINGS:    Large amounts of solid and liquid stool found throughout the colon  Despite aggressive irrigation and adequate visualization therefore procedure was aborted  Poor prep  IMPRESSIONS:      Poor prep  Inadequate visualization of colonic mucosa  Large amounts of solid and liquid stool throughout the colon    RECOMMENDATIONS:  Clear liquid diet  Resume miralax and Doculax  Repeat colonoscopy tomorrow  COMPLICATIONS:  None; patient tolerated the procedure well      DISPOSITION: PACU           CONDITION: Stable

## 2018-10-17 NOTE — OP NOTE
OPERATIVE REPORT  PATIENT NAME: Peg Chase    :  1955  MRN: [id number]  Pt Location: [facility] ROOM 01    SURGERY DATE: 10/17/2018    Surgeon(s) and Role:     * Sean Munguia MD - Primary     * Meaghan Epstein MD - Fellow    COLONOSCOPY    PROCEDURE: Colonoscopy    INDICATIONS: Screening for Colon Cancer    POST-OP DIAGNOSIS: See the impression below    SEDATION: Monitored anesthesia care, check anesthesia records    PHYSICAL EXAM:    /67   Pulse 63   Temp 97 5 °F (36 4 °C) (Oral)   Resp 14   Ht 5' 4" (1 626 m)   Wt 73 5 kg (162 lb)   SpO2 99%   BMI 27 81 kg/m²   Body mass index is 27 81 kg/m²  General: NAD  Heart: S1 & S2 normal, RRR  Lungs: CTA, No rales or rhonchi  Abdomen: Soft, nontender, nondistended, good bowel sounds    CONSENT:  Informed consent was obtained for the procedure, including sedation after explaining the risks and benefits of the procedure  Risks including but not limited to bleeding, perforation, infection, aspiration were discussed in detail  Also explained about less than 100%$ sensitivity with the exam and other alternatives  PREPARATION:   EKG tracing, pulse oximetry, blood pressure were monitored throughout the procedure  Patient was identified by myself both verbally and by visual inspection of ID band  DESCRIPTION:   Patient was placed in the left lateral decubitus position and was sedated with the above medication  Digital rectal examination was performed  The colonoscope was introduced in to the anal canal and advanced up to transverse colon  The procedure was terminated due to poor prep and inadequate visualization; findings and interventions are described below  Appropriate photodocumentation was obtained  The quality of the colonic preparation was poor  FINDINGS:    Large amounts of solid and liquid stool found throughout the colon  Despite aggressive irrigation and adequate visualization therefore procedure was aborted  Poor prep  IMPRESSIONS:      Poor prep  Inadequate visualization of colonic mucosa  Large amounts of solid and liquid stool throughout the colon    RECOMMENDATIONS:  Clear liquid diet  Resume miralax and Doculax  Repeat colonoscopy tomorrow  COMPLICATIONS:  None; patient tolerated the procedure well      DISPOSITION: PACU           CONDITION: Stable

## 2018-10-17 NOTE — H&P
History and Physical - [facility]  Davina England 59 y o  female MRN: [id number]                  HPI: Davina England is a 59y o  year old female who presents for colonoscopy for colon cancer screening  Previous colonoscopy was 7/18/07 with Dr Charbel Hernandez at age 49 which was normal and was advised on a 10 year follow-up  Denies unintentional weight loss, blood in stool or change in bowel habits  REVIEW OF SYSTEMS: Per the HPI, and otherwise unremarkable  Historical Information   Past Medical History:   Diagnosis Date    Anxiety     GERD (gastroesophageal reflux disease)     takes tums occasionaly     Past Surgical History:   Procedure Laterality Date    CARPAL TUNNEL RELEASE Bilateral     CHOLECYSTECTOMY      COLONOSCOPY      CYSTOSCOPY      NH CMBND ANTERPOST COLPORRAPHY W/CYSTO N/A 5/23/2016    Procedure: COLPORRHAPHY ANTERIOR POSTERIOR WITH GRAFT; ENTEROCELE REPAIR;  Surgeon: Thierno Mckinney MD;  [facility]: [facility];  Service: UroGynecology           NH CYSTOURETHROSCOPY N/A 5/23/2016    Procedure: Christine Collins;  Surgeon: Thierno Mckinney MD;  [facility];  Service: UroGynecology           NH REVAGINAL PROLAPSE,SACROSP LIG N/A 5/23/2016    Procedure: COLPOPEXY VAGINAL EXTRAPERITONEAL (VEC); Surgeon: Thierno Mckinney MD;  [facility];  Service: UroGynecology           NH SLING OPER STRES INCONTINENCE N/A 5/23/2016    Procedure: INSERTION PUBOVAGINAL SLING ;  Surgeon: Thierno Mckinney MD;  [facility];  Service: UroGynecology           TUBAL LIGATION       Social History   History   Alcohol Use    0 6 oz/week    1 Cans of beer per week     Comment: weekly     History   Drug use: Unknown     History   Smoking Status    Never Smoker   Smokeless Tobacco    Never Used     History reviewed  No pertinent family history      Meds/Allergies     Prescriptions Prior to Admission   Medication    docusate sodium (COLACE) 100 mg capsule    sertraline (ZOLOFT) 50 mg tablet    terbinafine (LamISIL) 250 mg tablet       Allergies   Allergen Reactions    Levaquin [Levofloxacin] Swelling and Other (See Comments)     Fluid in knee    Quinolones Other (See Comments)     And levaquin       Objective     Blood pressure 113/67, pulse 63, temperature 97 5 °F (36 4 °C), temperature source Oral, resp  rate 14, height 5' 4" (1 626 m), weight 73 5 kg (162 lb), SpO2 99 %        PHYSICAL EXAM    Gen: NAD  CV: RRR  CHEST: Clear  ABD: soft, NT/ND  EXT: no edema      ASSESSMENT/PLAN:  This is a 59y o  year old female here for colonoscopy for colon cancer screening(10 year interval)    PLAN:   Procedure:   Colonoscopy

## 2018-10-18 ENCOUNTER — HOSPITAL ENCOUNTER (OUTPATIENT)
Facility: HOSPITAL | Age: 63
Setting detail: OUTPATIENT SURGERY
Discharge: HOME | End: 2018-10-18
Attending: INTERNAL MEDICINE | Admitting: INTERNAL MEDICINE
Payer: COMMERCIAL

## 2018-10-18 ENCOUNTER — ANESTHESIA EVENT (OUTPATIENT)
Dept: GASTROENTEROLOGY | Facility: HOSPITAL | Age: 63
End: 2018-10-18
Payer: COMMERCIAL

## 2018-10-18 ENCOUNTER — ANESTHESIA (OUTPATIENT)
Dept: GASTROENTEROLOGY | Facility: HOSPITAL | Age: 63
End: 2018-10-18
Payer: COMMERCIAL

## 2018-10-18 ENCOUNTER — OFFICE VISIT (OUTPATIENT)
Dept: BEHAVIORAL/MENTAL HEALTH CLINIC | Facility: CLINIC | Age: 63
End: 2018-10-18
Payer: COMMERCIAL

## 2018-10-18 VITALS
RESPIRATION RATE: 16 BRPM | HEART RATE: 70 BPM | OXYGEN SATURATION: 100 % | SYSTOLIC BLOOD PRESSURE: 90 MMHG | DIASTOLIC BLOOD PRESSURE: 58 MMHG | TEMPERATURE: 97.6 F

## 2018-10-18 DIAGNOSIS — F41.9 ANXIETY: Primary | ICD-10-CM

## 2018-10-18 PROCEDURE — 45385 COLONOSCOPY W/LESION REMOVAL: CPT | Performed by: INTERNAL MEDICINE

## 2018-10-18 PROCEDURE — 90834 PSYTX W PT 45 MINUTES: CPT | Performed by: SOCIAL WORKER

## 2018-10-18 PROCEDURE — 88305 TISSUE EXAM BY PATHOLOGIST: CPT | Performed by: PATHOLOGY

## 2018-10-18 RX ORDER — PROPOFOL 10 MG/ML
INJECTION, EMULSION INTRAVENOUS AS NEEDED
Status: DISCONTINUED | OUTPATIENT
Start: 2018-10-18 | End: 2018-10-18 | Stop reason: SURG

## 2018-10-18 RX ORDER — GLYCOPYRROLATE 0.2 MG/ML
INJECTION INTRAMUSCULAR; INTRAVENOUS AS NEEDED
Status: DISCONTINUED | OUTPATIENT
Start: 2018-10-18 | End: 2018-10-18 | Stop reason: SURG

## 2018-10-18 RX ORDER — LIDOCAINE HYDROCHLORIDE 10 MG/ML
0.5 INJECTION, SOLUTION INFILTRATION; PERINEURAL ONCE AS NEEDED
Status: CANCELLED | OUTPATIENT
Start: 2018-10-18

## 2018-10-18 RX ORDER — SODIUM CHLORIDE 9 MG/ML
50 INJECTION, SOLUTION INTRAVENOUS CONTINUOUS
Status: CANCELLED | OUTPATIENT
Start: 2018-10-18

## 2018-10-18 RX ORDER — SODIUM CHLORIDE 9 MG/ML
INJECTION, SOLUTION INTRAVENOUS CONTINUOUS PRN
Status: DISCONTINUED | OUTPATIENT
Start: 2018-10-18 | End: 2018-10-18 | Stop reason: SURG

## 2018-10-18 RX ADMIN — GLYCOPYRROLATE 0.2 MG: 0.2 INJECTION, SOLUTION INTRAMUSCULAR; INTRAVENOUS at 12:08

## 2018-10-18 RX ADMIN — SODIUM CHLORIDE: 0.9 INJECTION, SOLUTION INTRAVENOUS at 10:53

## 2018-10-18 RX ADMIN — PROPOFOL 25 MG: 10 INJECTION, EMULSION INTRAVENOUS at 12:08

## 2018-10-18 RX ADMIN — PROPOFOL 50 MG: 10 INJECTION, EMULSION INTRAVENOUS at 11:42

## 2018-10-18 RX ADMIN — PROPOFOL 25 MG: 10 INJECTION, EMULSION INTRAVENOUS at 12:20

## 2018-10-18 RX ADMIN — PROPOFOL 25 MG: 10 INJECTION, EMULSION INTRAVENOUS at 12:02

## 2018-10-18 RX ADMIN — PROPOFOL 25 MG: 10 INJECTION, EMULSION INTRAVENOUS at 11:47

## 2018-10-18 RX ADMIN — PROPOFOL 25 MG: 10 INJECTION, EMULSION INTRAVENOUS at 12:14

## 2018-10-18 RX ADMIN — PROPOFOL 25 MG: 10 INJECTION, EMULSION INTRAVENOUS at 11:53

## 2018-10-18 RX ADMIN — PROPOFOL 25 MG: 10 INJECTION, EMULSION INTRAVENOUS at 11:44

## 2018-10-18 RX ADMIN — PROPOFOL 25 MG: 10 INJECTION, EMULSION INTRAVENOUS at 11:59

## 2018-10-18 RX ADMIN — PROPOFOL 25 MG: 10 INJECTION, EMULSION INTRAVENOUS at 12:05

## 2018-10-18 RX ADMIN — PROPOFOL 50 MG: 10 INJECTION, EMULSION INTRAVENOUS at 11:41

## 2018-10-18 RX ADMIN — PROPOFOL 25 MG: 10 INJECTION, EMULSION INTRAVENOUS at 12:17

## 2018-10-18 RX ADMIN — PROPOFOL 25 MG: 10 INJECTION, EMULSION INTRAVENOUS at 12:23

## 2018-10-18 RX ADMIN — PROPOFOL 25 MG: 10 INJECTION, EMULSION INTRAVENOUS at 11:50

## 2018-10-18 RX ADMIN — PROPOFOL 25 MG: 10 INJECTION, EMULSION INTRAVENOUS at 12:11

## 2018-10-18 RX ADMIN — PROPOFOL 25 MG: 10 INJECTION, EMULSION INTRAVENOUS at 11:56

## 2018-10-18 NOTE — DISCHARGE INSTR - AVS FIRST PAGE
OPERATIVE REPORT  PATIENT NAME: Chuy Coburn    :  1955  MRN: [id number]  Pt Location: [facility] ROOM 01    SURGERY DATE: 10/18/2018    Surgeon(s) and Role:     Ligia Arciniega MD - Primary    Preop Diagnosis:  * No pre-op diagnosis entered *    * No Diagnosis Codes entered *    Procedure(s) (LRB):  COLONOSCOPY (N/A)    Specimen(s):  ID Type Source Tests Collected by Time Destination   1 : cecum polyp Tissue Polyp, Colorectal TISSUE EXAM Genna Hernandez MD 10/18/2018 1210        Colonoscopy Procedure Note    Procedure: Colonoscopy    Sedation: Monitored anesthesia care, check anesthesia records      ASA Class: 2    INDICATIONS: Screening for Colon Cancer    POST-OP DIAGNOSIS: See the impression below    Procedure Details     Prior colonoscopy: More than 10 years ago  Informed consent was obtained for the procedure, including sedation  Risks of perforation, hemorrhage, adverse drug reaction and aspiration were discussed  The patient was placed in the left lateral decubitus position  Based on the pre-procedure assessment, including review of the patient's medical history, medications, allergies, and review of systems, she had been deemed to be an appropriate candidate for conscious sedation; she was therefore sedated with the medications listed below  The patient was monitored continuously with telemetry, pulse oximetry, blood pressure monitoring, and direct observations  A rectal examination was performed  The colonoscope was inserted into the rectum and advanced under direct vision to the cecum, which was identified by the ileocecal valve and appendiceal orifice  The quality of the colonic preparation was good  A careful inspection was made as the colonoscope was withdrawn, including a retroflexed view of the rectum; findings and interventions are described below      Findings:  Cecal flat polyp 8 mm size, resected using hot snare, clip was used to prevent bleeding  Normal ascending colon  Normal transverse colon  Normal sigmoid colon  Normal rectum and anal canal           Complications:  None; patient tolerated the procedure well  Impression:    Cecal blood polyp 8 mm size resected    Recommendations:   Repeat colonoscopy based on pathology report    SIGNATURE: Malina Brandon MD  DATE: October 18, 2018  TIME: 12:36 PM      Attestation signed by Lulu Tukr MD at 10/18/2018  1:30 PM:  Lulu Turk MD was present throughout the entire procedure from insertion to complete withdrawal of the scope  I performed all interventions myself or oversaw the fellow       Lulu Turk MD

## 2018-10-18 NOTE — OP NOTE
OPERATIVE REPORT  PATIENT NAME: Memo Waldrop    :  1955  MRN: [id number]  Pt Location: [facility] ROOM 01    SURGERY DATE: 10/18/2018    Surgeon(s) and Role:     Corena Alpers, MD - Primary    Preop Diagnosis:  * No pre-op diagnosis entered *    * No Diagnosis Codes entered *    Procedure(s) (LRB):  COLONOSCOPY (N/A)    Specimen(s):  ID Type Source Tests Collected by Time Destination   1 : cecum polyp Tissue Polyp, Colorectal TISSUE EXAM Jaquelin Oliva MD 10/18/2018 1210        Colonoscopy Procedure Note    Procedure: Colonoscopy    Sedation: Monitored anesthesia care, check anesthesia records      ASA Class: 2    INDICATIONS: Screening for Colon Cancer    POST-OP DIAGNOSIS: See the impression below    Procedure Details     Prior colonoscopy: More than 10 years ago  Informed consent was obtained for the procedure, including sedation  Risks of perforation, hemorrhage, adverse drug reaction and aspiration were discussed  The patient was placed in the left lateral decubitus position  Based on the pre-procedure assessment, including review of the patient's medical history, medications, allergies, and review of systems, she had been deemed to be an appropriate candidate for conscious sedation; she was therefore sedated with the medications listed below  The patient was monitored continuously with telemetry, pulse oximetry, blood pressure monitoring, and direct observations  A rectal examination was performed  The colonoscope was inserted into the rectum and advanced under direct vision to the cecum, which was identified by the ileocecal valve and appendiceal orifice  The quality of the colonic preparation was good  A careful inspection was made as the colonoscope was withdrawn, including a retroflexed view of the rectum; findings and interventions are described below      Findings:  Cecal flat polyp 8 mm size, resected using hot snare, clip was used to prevent bleeding  Normal ascending colon  Normal transverse colon  Normal sigmoid colon  Normal rectum and anal canal           Complications:  None; patient tolerated the procedure well      Impression:    Cecal blood polyp 8 mm size resected    Recommendations:   Repeat colonoscopy based on pathology report    SIGNATURE: Myra Mathis MD  DATE: October 18, 2018  TIME: 12:36 PM

## 2018-10-18 NOTE — ANESTHESIA PREPROCEDURE EVALUATION
Review of Systems/Medical History  Patient summary reviewed  Chart reviewed  No history of anesthetic complications     Cardiovascular  Negative cardio ROS Exercise tolerance (METS): >4,     Pulmonary  Negative pulmonary ROS        GI/Hepatic    GERD (Tums prn) ,        Negative  ROS        Endo/Other  Negative endo/other ROS      GYN  Negative gynecology ROS          Hematology  Negative hematology ROS      Musculoskeletal  Negative musculoskeletal ROS        Neurology  Negative neurology ROS      Psychology   Anxiety,              Physical Exam    Airway    Mallampati score: II  TM Distance: >3 FB  Neck ROM: full     Dental   No notable dental hx     Cardiovascular  Comment: Negative ROS, Rhythm: regular, No murmur,     Pulmonary  Breath sounds clear to auscultation,     Other Findings        Anesthesia Plan  ASA Score- 2     Anesthesia Type- IV sedation with anesthesia with ASA Monitors  Additional Monitors:   Airway Plan:         Plan Factors-    Induction- intravenous  Postoperative Plan-     Informed Consent- Anesthetic plan and risks discussed with patient

## 2018-10-18 NOTE — PSYCH
Eddie Kendall  1955       Date of Initial Treatment Plan: 5/30/18   Date of Current Treatment Plan: 10/18/18    Treatment Plan Number 2    Strengths/Personal Resources for Self Care: I am always even keeled in stressful situations, optimistic, humorous, pretty loyal, dependable, a good teacher, a good mentor, a good resource, a good friend, champion of the underdog! Diagnosis:   DAVID    Area of Needs: I have a void where my Faith and my spiritual community was  I have felt less appreciated by my adult children over the last few years  There is a void there  Long Term Goal 1: AI want to continue to explore my brittney  Target Date:2/18/19  Completion Date: na         Short Term Objectives for Goal 1: AI will find a supportive spiritual community  Long Term Goal 2: I want to continue what I am doing  Target Date: 2/18/19  Completion Date: N/A    Short Term Objectives for Goal 2: AI will contine to work with my            Long Term Goal # 3: I want to improve myself     Target Date: 2/18/19Completion Date: N/A    Short Term Objectives for Goal 3: AI will continue to do one thing each day to let him know that I love him  GOAL 1: Modality: Individual 1x per month   Completion Date na and The person(s) responsible for carrying out the plan is  Imani Montenegro    GOAL 2: Modality: Individual 1x per month   Completion Date na and The person(s) responsible for carrying out the plan is  Imani Montenegro    GOAL 3: Modality:Individual 1x per month   Completion Date na and The person(s) responsible for carrying out the plan is  Demi [address] [place] [address] Road: Diagnosis and Treatment Plan explained to Cherie Costello relates understanding diagnosis and is agreeable to Treatment Plan         Client Comments : Please share your thoughts, feelings, need and/or experiences regarding your treatment plan: __________________________________________________________________    __________________________________________________________________    Patient signature, Date Time: __________________________________________             Physician cosigner signature, Date, Time: ________________________________

## 2018-10-18 NOTE — PSYCH
Treatment Plan Tracking    # 0Treatment Plan not completed within required time limits due to: Client has not been seen in last 6 weeks  Azra Wallis

## 2018-10-18 NOTE — ANESTHESIA POSTPROCEDURE EVALUATION
Post-Op Assessment Note      CV Status:  Stable    Mental Status:  Awake    Hydration Status:  Euvolemic    PONV Controlled:  Controlled    Airway Patency:  Patent    Post Op Vitals Reviewed: Yes          Staff: CRNA           BP 90/58 (10/18/18 1241)    Temp      Pulse 70 (10/18/18 1241)   Resp 16 (10/18/18 1241)    SpO2 100 % (10/18/18 1241)

## 2018-10-19 NOTE — PSYCH
Psychotherapy Provided: Individual Psychotherapy 50 minutes     Length of time in session: 50 minutes, follow up in 2 week    Goals addressed in session: Goal 1, Goal 2 and Goal 3      Pain:      none    0    Current suicide risk : Low     D:  Roberta Pearce spoke today re: her 's willingness to begin therapy  Ways this has decreased her feelings of stress and concern for his mental illness were processed and the need for her to be responsible (only) for her self were the focus of the session  A:  Roberta Pearce once again demonstrated considerable insight and has continued to put ongoing  effort into addressing her goals  She enjoys therapy and has expressed an interest in continuing past the 5 EAP sessions provided to her  P:Sessions will used to provide support and encourage continued growth  Behavioral Health Treatment Plan [facility]: Diagnosis and Treatment Plan explained to Ashley Boyle relates understanding diagnosis and is agreeable to Treatment Plan   Yes

## 2018-10-26 ENCOUNTER — TELEPHONE (OUTPATIENT)
Dept: GASTROENTEROLOGY | Facility: CLINIC | Age: 63
End: 2018-10-26

## 2018-10-26 NOTE — TELEPHONE ENCOUNTER
I called her and let her know that her pathology showed a sessile serrated adenoma and that most likely Dr Janine Paulson would recommend a repeat colonoscopy in 5 years given that it was 8 millimeters in size on the colonoscopy report but that sometimes this varies, I let her know that we will call her back to give her the final recommendation  We discussed that this is a precancerous lesion but is not cancer  All of her questions were answered

## 2018-10-27 NOTE — TELEPHONE ENCOUNTER
I agree with the plan  I did not quite understand the final recommendation  Please let me know if there is any more questions  Thanks

## 2018-10-29 NOTE — TELEPHONE ENCOUNTER
Can you please let her know that Dr Sumanth Gama agreed that she should repeat her colonoscopy in 5 years? Thanks! I spoke to her about the findings before

## 2018-11-03 ENCOUNTER — HOSPITAL ENCOUNTER (OUTPATIENT)
Dept: RADIOLOGY | Age: 63
Discharge: HOME | End: 2018-11-03
Payer: COMMERCIAL

## 2018-11-03 DIAGNOSIS — Z13.820 OSTEOPOROSIS SCREENING: ICD-10-CM

## 2018-11-03 PROCEDURE — 77080 DXA BONE DENSITY AXIAL: CPT

## 2018-12-03 ENCOUNTER — OFFICE VISIT (OUTPATIENT)
Dept: BEHAVIORAL/MENTAL HEALTH CLINIC | Facility: CLINIC | Age: 63
End: 2018-12-03
Payer: COMMERCIAL

## 2018-12-03 DIAGNOSIS — F41.9 ANXIETY: ICD-10-CM

## 2018-12-03 PROCEDURE — 90834 PSYTX W PT 45 MINUTES: CPT | Performed by: SOCIAL WORKER

## 2018-12-10 ENCOUNTER — OFFICE VISIT (OUTPATIENT)
Dept: BEHAVIORAL/MENTAL HEALTH CLINIC | Facility: CLINIC | Age: 63
End: 2018-12-10
Payer: COMMERCIAL

## 2018-12-10 DIAGNOSIS — F41.9 ANXIETY: ICD-10-CM

## 2018-12-10 PROCEDURE — 90834 PSYTX W PT 45 MINUTES: CPT | Performed by: SOCIAL WORKER

## 2018-12-10 NOTE — PSYCH
Psychotherapy Provided: Individual Psychotherapy 50 minutes     Length of time in session: 50 minutes, follow up in 2 week    Goals addressed in session: Goal 1, Goal 2 and Goal 3      Pain:      none    0    Current suicide risk : Low     D:  Kirill Carrera spoke today re: her concerns that her 's depression is returning and that he may need to be hospitalized  Ways this has increased her feelings of stress and concern for how his mental illness impacts herself and their family were processed and the need for her to be responsible (only) for her self were again the focus of the session  A:  Kirill Carrera once again demonstrated considerable insight and has continued to put ongoing  effort into addressing her goals  She enjoys therapy and has expressed continued  interest in continuing past the 5 EAP sessions provided to her  P:Sessions will used to provide support and encourage continued growth  [facility]: Diagnosis and Treatment Plan explained to Missael Mckeon relates understanding diagnosis and is agreeable to Treatment Plan   Yes

## 2019-01-03 ENCOUNTER — OFFICE VISIT (OUTPATIENT)
Dept: BEHAVIORAL/MENTAL HEALTH CLINIC | Facility: CLINIC | Age: 64
End: 2019-01-03
Payer: COMMERCIAL

## 2019-01-03 DIAGNOSIS — F41.9 ANXIETY: ICD-10-CM

## 2019-01-03 PROCEDURE — 90834 PSYTX W PT 45 MINUTES: CPT | Performed by: SOCIAL WORKER

## 2019-01-04 NOTE — PSYCH
Psychotherapy Provided: Individual Psychotherapy 50 minutes     Length of time in session: 50 minutes, follow up in 2 week    Goals addressed in session: Goal 1, Goal 2 and Goal 3      Pain:      none    0    Current suicide risk : Low     D:  Romayne Doyne spoke today re: her feelings about her 's apology for how his behavior impacted their holidays and what it means that he is now attending therapy  A:  Romayne Doyne accepted feedback on the importance of working, focusing on herself and her goals while her  does the same  P:Sessions will used to provide support and encourage continued growth  [facility] Health Treatment Plan [facility]: Diagnosis and Treatment Plan explained to Rosalina Bajwa relates understanding diagnosis and is agreeable to Treatment Plan   Yes

## 2019-01-09 ENCOUNTER — ANNUAL EXAM (OUTPATIENT)
Dept: OBGYN CLINIC | Facility: CLINIC | Age: 64
End: 2019-01-09
Payer: COMMERCIAL

## 2019-01-09 VITALS
WEIGHT: 163 LBS | HEIGHT: 64 IN | SYSTOLIC BLOOD PRESSURE: 104 MMHG | DIASTOLIC BLOOD PRESSURE: 70 MMHG | BODY MASS INDEX: 27.83 KG/M2

## 2019-01-09 DIAGNOSIS — F32.A DEPRESSION, UNSPECIFIED DEPRESSION TYPE: ICD-10-CM

## 2019-01-09 DIAGNOSIS — Z01.419 ENCOUNTER FOR GYNECOLOGICAL EXAMINATION WITHOUT ABNORMAL FINDING: ICD-10-CM

## 2019-01-09 DIAGNOSIS — Z12.31 ENCOUNTER FOR SCREENING MAMMOGRAM FOR MALIGNANT NEOPLASM OF BREAST: Primary | ICD-10-CM

## 2019-01-09 PROCEDURE — 99396 PREV VISIT EST AGE 40-64: CPT | Performed by: PHYSICIAN ASSISTANT

## 2019-01-09 RX ORDER — FAMOTIDINE 40 MG/1
TABLET, FILM COATED ORAL
COMMUNITY
End: 2020-12-04

## 2019-01-09 RX ORDER — MULTIVIT-MIN/IRON/FOLIC ACID/K 18-600-40
CAPSULE ORAL
COMMUNITY

## 2019-01-09 RX ORDER — CLOBETASOL PROPIONATE 0.5 MG/G
OINTMENT TOPICAL 2 TIMES DAILY
COMMUNITY
Start: 2017-08-24 | End: 2020-12-04

## 2019-01-09 NOTE — PROGRESS NOTES
Emma Kellie   1955    CC:  Yearly exam    S:  59 y o  female here for yearly exam  She is postmenopausal and has had no vaginal bleeding  She denies vaginal discharge, itching, odor or dryness  She is sexually active and uses Astroglide with good results  She notes some dryness and we discussed trying coconut oil for this  She hasn't used her clobetasol ointment since last seen (for her lichen sclerosus)  She is maintained on Zoloft with good results  Last Pap 7/2/15 neg/neg  Last Mammo 11/28/17 neg  Last DEXA 11/3/18 osteopenia  Last Colonoscopy 10/2018 (repeat 5 years)    Current Outpatient Prescriptions:     Cholecalciferol (VITAMIN D) 2000 units CAPS, Take by mouth, Disp: , Rfl:     clobetasol (TEMOVATE) 0 05 % ointment, Apply topically 2 (two) times a day, Disp: , Rfl:     Cranberry 1000 MG CAPS, Take by mouth, Disp: , Rfl:     docusate sodium (COLACE) 100 mg capsule, Take 100 mg by mouth 2 (two) times a day , Disp: , Rfl:     famotidine (PEPCID) 40 MG tablet, Take by mouth, Disp: , Rfl:     Multiple Vitamins-Minerals (MULTIVITAL-M PO), Take by mouth, Disp: , Rfl:     sertraline (ZOLOFT) 50 mg tablet, Take 1 tablet (50 mg total) by mouth daily, Disp: 90 tablet, Rfl: 3  Social History     Social History    Marital status: /Civil Union     Spouse name: N/A    Number of children: N/A    Years of education: N/A     Occupational History    Not on file  Social History Main Topics    Smoking status: Never Smoker    Smokeless tobacco: Never Used    Alcohol use 0 6 oz/week     1 Cans of beer per week      Comment: weekly    Drug use: No    Sexual activity: Yes     Partners: Male     Other Topics Concern    Not on file     Social History Narrative    No narrative on file     History reviewed  No pertinent family history    Past Medical History:   Diagnosis Date    Anxiety     Colon cancer screening     GERD (gastroesophageal reflux disease)     takes tums occasionaly O:  Blood pressure 104/70, height 5' 4" (1 626 m), weight 73 9 kg (163 lb), not currently breastfeeding  Patient appears well and is not in distress  Neck is supple without masses  Breasts are symmetrical without mass, tenderness, nipple discharge, skin changes or adenopathy  Abdomen is soft and nontender without masses  External genitals 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; there is fusing at the anterior introitus  There is a deep fissure at the posterior introitus that is nontender and asymptomatic  Vagina is normal without discharge or bleeding  Cervix is normal without discharge or lesion  Uterus is normal, mobile, nontender without palpable mass  Adnexa are normal, nontender, without palpable mass  A:   Yearly exam     Osteopenia   Lichen sclerosus   Vaginal atrophy    P:   Pap 2020   Mammo slip given   Zoloft sent to pharmacy   Rec  Coconut oil for dryness   Clobetasol PRN for lichen sclerosus   Rec  Citracal Petites for calcium supplementation, daily    weight bearing exercise    RTO one year for yearly exam or sooner as needed

## 2019-01-30 ENCOUNTER — OFFICE VISIT (OUTPATIENT)
Dept: BEHAVIORAL/MENTAL HEALTH CLINIC | Facility: CLINIC | Age: 64
End: 2019-01-30
Payer: COMMERCIAL

## 2019-01-30 DIAGNOSIS — F41.9 ANXIETY: ICD-10-CM

## 2019-01-30 PROCEDURE — 90834 PSYTX W PT 45 MINUTES: CPT | Performed by: SOCIAL WORKER

## 2019-01-30 NOTE — PSYCH
Psychotherapy Provided: Individual Psychotherapy 50 minutes     Length of time in session: 50 minutes, follow up in 2 week    Goals addressed in session: Goal 1, Goal 2 and Goal 3      Pain:      none    0    Current suicide risk : Low     D:  Chester Prieto spoke today re: her feelings about her brother's recent conversation with a friend of 48 years who asked him about their father's suicide  Today's session was spent exploring her reaction to learning that this (may) have been the cause of her father's death when she was 6  A:  Chester Prieto accepted support re: this possibility but presented as guarded against exploring the depth of feelings she might have about this  P:Sessions will used to provide support and encourage continued growth  Behavioral Health Treatment Plan [facility]: Diagnosis and Treatment Plan explained to Angelita Crawley relates understanding diagnosis and is agreeable to Treatment Plan   Yes

## 2019-02-14 ENCOUNTER — OFFICE VISIT (OUTPATIENT)
Dept: BEHAVIORAL/MENTAL HEALTH CLINIC | Facility: CLINIC | Age: 64
End: 2019-02-14
Payer: COMMERCIAL

## 2019-02-14 DIAGNOSIS — F41.9 ANXIETY: ICD-10-CM

## 2019-02-14 PROCEDURE — 90834 PSYTX W PT 45 MINUTES: CPT | Performed by: SOCIAL WORKER

## 2019-02-14 NOTE — PSYCH
Elena Frazier  1955       Date of Initial Treatment Plan: 5/30/18   Date of Current Treatment Plan: 02/14/19    Treatment Plan Number 3    Strengths/Personal Resources for Self Care: I am always even keeled in stressful situations, optimistic, humorous, pretty loyal, dependable, a good teacher, a good mentor, a good resource, a good friend, champion of the underdog! Diagnosis:   DAVID    Area of Needs: I have a void where my [employer] and my spiritual community was  I have felt less appreciated by my adult children over the last few years  There is a void there  Long Term Goal 1: AI want to continue to explore my brittney  Target Date:6/14/19  Completion Date: na         Short Term Objectives for Goal 1: AI will contine to search for a new supportive spiritual community    Long Term Goal 2: I want to continue my health routine   Target Date: 6/14/19  Completion Date: N/A    Short Term Objectives for Goal 2: AI will contine to work with my            Long Term Goal # 3: I want to be more realistically supportive of my 's recovery     Target Date: 6/14/19  Completion Date: N/A    Short Term Objectives for Goal 3: AI will work on increasing my mindful awareness while decreasing my anxious responses to him  GOAL 1: Modality: Individual 1x per month   Completion Date na and The person(s) responsible for carrying out the plan is  Chloé Whitaker    GOAL 2: Modality: Individual 1x per month   Completion Date na and The person(s) responsible for carrying out the plan is  Chloé Whitaker    GOAL 3: Modality:Individual 1x per month   Completion Date na and The person(s) responsible for carrying out the plan is  [address]: Diagnosis and Treatment Plan explained to Casey Bartlett relates understanding diagnosis and is agreeable to Treatment Plan         Client Comments : Please share your thoughts, feelings, need and/or experiences regarding your treatment plan:       __________________________________________________________________    __________________________________________________________________    Patient signature, Date Time: __________________________________________             Physician cosigner signature, Date, Time: ________________________________

## 2019-02-15 NOTE — PSYCH
Psychotherapy Provided: Individual Psychotherapy 50 minutes     Length of time in session: 50 minutes, follow up in 2 week    Goals addressed in session: Goal 1, Goal 2 and Goal 3      Pain:      none    0    Current suicide risk : Low     D:  Humphrey Varela spoke today re: her feelings about her marriage and 's progress in therapy  She shared how much her happiness is dependent upon the happiness of those she loves  This was explored and processed  A:  Humphrey Varela was initially reluctant to consider feedback on the above but became more open as the session advanced  She enjoys therapy and benefits from being challenged  P:Sessions will used to provide support and encourage continued growth  Behavioral Health Treatment Plan [facility]: Diagnosis and Treatment Plan explained to Yfn Kasper relates understanding diagnosis and is agreeable to Treatment Plan   Yes

## 2019-02-19 ENCOUNTER — HOSPITAL ENCOUNTER (OUTPATIENT)
Dept: RADIOLOGY | Age: 64
Discharge: HOME | End: 2019-02-19
Payer: COMMERCIAL

## 2019-02-19 VITALS — WEIGHT: 165 LBS | BODY MASS INDEX: 27.49 KG/M2 | HEIGHT: 65 IN

## 2019-02-19 DIAGNOSIS — Z12.31 ENCOUNTER FOR SCREENING MAMMOGRAM FOR MALIGNANT NEOPLASM OF BREAST: ICD-10-CM

## 2019-02-19 PROCEDURE — 77067 SCR MAMMO BI INCL CAD: CPT

## 2019-05-23 ENCOUNTER — OFFICE VISIT (OUTPATIENT)
Dept: BEHAVIORAL/MENTAL HEALTH CLINIC | Facility: CLINIC | Age: 64
End: 2019-05-23
Payer: COMMERCIAL

## 2019-05-23 DIAGNOSIS — F41.9 ANXIETY: ICD-10-CM

## 2019-05-23 PROCEDURE — 90834 PSYTX W PT 45 MINUTES: CPT | Performed by: SOCIAL WORKER

## 2019-07-01 ENCOUNTER — SOCIAL WORK (OUTPATIENT)
Dept: BEHAVIORAL/MENTAL HEALTH CLINIC | Facility: CLINIC | Age: 64
End: 2019-07-01
Payer: COMMERCIAL

## 2019-07-01 DIAGNOSIS — F41.9 ANXIETY: ICD-10-CM

## 2019-07-01 PROCEDURE — 90834 PSYTX W PT 45 MINUTES: CPT | Performed by: SOCIAL WORKER

## 2019-07-03 NOTE — PSYCH
Psychotherapy Provided: Individual Psychotherapy 50 minutes     Length of time in session: 50 minutes, follow up in 2 week    Goals addressed in session: Goal 1, Goal 2 and Goal 3      Pain:      none    0    Current suicide risk : Low     D:  Marialuisa Molina spoke today re: her feelings about an article she recently read re: changes in a person's work role as the progress towards jail age  This was extremely insightful and validating to her and she shared her thoughts and feelings with this worker for much of today's session on this topic  Her Treatment Plan was also reviewed and updated  A:  Marialuisa Molina continues to enjoy and benefit from therapy  P:Sessions will used to provide support and encourage continued growth  Behavioral Health Treatment Plan [facility]: Diagnosis and Treatment Plan explained to Сергей Louis relates understanding diagnosis and is agreeable to Treatment Plan   Yes

## 2019-08-07 ENCOUNTER — SOCIAL WORK (OUTPATIENT)
Dept: BEHAVIORAL/MENTAL HEALTH CLINIC | Facility: CLINIC | Age: 64
End: 2019-08-07
Payer: COMMERCIAL

## 2019-08-07 DIAGNOSIS — F41.9 ANXIETY: ICD-10-CM

## 2019-08-07 PROCEDURE — 90834 PSYTX W PT 45 MINUTES: CPT | Performed by: SOCIAL WORKER

## 2019-08-08 NOTE — PSYCH
Psychotherapy Provided: Individual Psychotherapy 50 minutes     Length of time in session: 50 minutes, follow up in 2 week    Goals addressed in session: Goal 1, Goal 2 and Goal 3      Pain:      none    0    Current suicide risk : Low     D:  Misael Collins spoke today re: her feelings re: how "tough" her daughter is towards her father since she has been residing at home  Misael Collins discussed her own feelings re: her 's struggles with his mental illness and difficulties she has had forgiving him  A:  Misael Collins presented as conflicted re: the above  She has expressed her frustration with her daughter, but does not appear to be able to do so with her  for fear that it will push him "over the edge" so she holds in in  P:Sessions will continue to be used to provide support and encourage continued growth  Behavioral Health Treatment Plan [facility]: Diagnosis and Treatment Plan explained to Angel Chavis relates understanding diagnosis and is agreeable to Treatment Plan   Yes

## 2019-09-05 ENCOUNTER — SOCIAL WORK (OUTPATIENT)
Dept: BEHAVIORAL/MENTAL HEALTH CLINIC | Facility: CLINIC | Age: 64
End: 2019-09-05
Payer: COMMERCIAL

## 2019-09-05 DIAGNOSIS — F41.9 ANXIETY: ICD-10-CM

## 2019-09-05 PROCEDURE — 90834 PSYTX W PT 45 MINUTES: CPT | Performed by: SOCIAL WORKER

## 2019-09-26 NOTE — PSYCH
Psychotherapy Provided: Individual Psychotherapy 50 minutes     Length of time in session: 50 minutes, follow up in 2 week    Goals addressed in session: Goal 1, Goal 2 and Goal 3      Pain:      none    0    Current suicide risk : Low     D:  Nestor Bardales spoke today re: her feelings re: her relationship with her sister-in-law and the support that they give each other  This was processed for much of today's session  A:  Nestor Bardales continues to struggle with her 's mental health issues and the impact that they have on she and their children  She has benefitted from the support of therapy to assist her in managing this  P:Sessions will continue to be used to provide support and encourage continued growth  Behavioral Health Treatment Plan [facility]: Diagnosis and Treatment Plan explained to Martha Recinos relates understanding diagnosis and is agreeable to Treatment Plan   Yes

## 2019-10-14 ENCOUNTER — SOCIAL WORK (OUTPATIENT)
Dept: BEHAVIORAL/MENTAL HEALTH CLINIC | Facility: CLINIC | Age: 64
End: 2019-10-14
Payer: COMMERCIAL

## 2019-10-14 DIAGNOSIS — F41.9 ANXIETY: ICD-10-CM

## 2019-10-14 PROCEDURE — 90834 PSYTX W PT 45 MINUTES: CPT | Performed by: SOCIAL WORKER

## 2019-10-14 NOTE — BH TREATMENT PLAN
Norma Avery  1955         Date of Initial Treatment Plan: 5/30/18   Date of Current Treatment Plan: 10/1419     Treatment Plan Number 5     Strengths/Personal Resources for Self Care: I am always even keeled in stressful situations, optimistic, humorous, pretty loyal, dependable, a good teacher, a good mentor, a good resource, a good friend, champion of the underdog!     Diagnosis:   DAVID     Area of Needs: I have a past history of traumas that I am still angry about        Long Tern Goal 1:  I want to heal     Target Date:  2/14/20     Cpompletion Date:  na  Short Term Objective for Goal !: A I will continue to attend therapy sessions where I can imagine letting go of the anger    I will process how my traumas continue to impact me         GOAL 1: Modality: Individual 1x per month   Completion Date na and The person(s) responsible for carrying out the plan is  Steve Simms: Diagnosis and Treatment Plan explained to Thu López relates understanding diagnosis and is agreeable to Treatment Plan          Client Comments : Please share your thoughts, feelings, need and/or experiences regarding your treatment plan:         __________________________________________________________________

## 2019-10-15 NOTE — PSYCH
Treatment Plan Tracking    # 0Treatment Plan not completed within required time limits due to: Yisel Rodriguez is seen once per month- Treatment plan was completed today  J Luis James

## 2019-10-15 NOTE — PSYCH
Psychotherapy Provided: Individual Psychotherapy 50 minutes     Length of time in session: 50 minutes, follow up in 2 week    Goals addressed in session: Goal 1, Goal 2 and Goal 3      Pain:      none    0    Current suicide risk : Low     D:  Lizbeth Minaya spoke today re: her feelings re: her relationships with her family members, particularly her inability to forgive her  for the past despite his stability  Her Treatment Plan was also reviewed and updated  A:  Lizbeth Minaya continues to struggle with her 's mental health issues and the impact that they have had on she and their children  She has benefitted from the support of therapy to assist her in managing this  P:Sessions will continue to be used to provide support and encourage continued growth  Behavioral Health Treatment Plan [facility]: Diagnosis and Treatment Plan explained to Zia Alex relates understanding diagnosis and is agreeable to Treatment Plan   Yes

## 2019-11-03 ENCOUNTER — APPOINTMENT (OUTPATIENT)
Dept: LAB | Age: 64
End: 2019-11-03
Payer: COMMERCIAL

## 2019-11-03 ENCOUNTER — TRANSCRIBE ORDERS (OUTPATIENT)
Dept: ADMINISTRATIVE | Age: 64
End: 2019-11-03

## 2019-11-03 DIAGNOSIS — R73.01 IFG (IMPAIRED FASTING GLUCOSE): ICD-10-CM

## 2019-11-03 DIAGNOSIS — R73.01 IFG (IMPAIRED FASTING GLUCOSE): Primary | ICD-10-CM

## 2019-11-03 DIAGNOSIS — E55.9 VITAMIN D DEFICIENCY: ICD-10-CM

## 2019-11-03 LAB
25(OH)D3 SERPL-MCNC: 20.4 NG/ML (ref 30–100)
ALBUMIN SERPL BCP-MCNC: 3.7 G/DL (ref 3.5–5)
ALP SERPL-CCNC: 110 U/L (ref 46–116)
ALT SERPL W P-5'-P-CCNC: 31 U/L (ref 12–78)
ANION GAP SERPL CALCULATED.3IONS-SCNC: 5 MMOL/L (ref 4–13)
AST SERPL W P-5'-P-CCNC: 18 U/L (ref 5–45)
BILIRUB SERPL-MCNC: 0.63 MG/DL (ref 0.2–1)
BUN SERPL-MCNC: 17 MG/DL (ref 5–25)
CALCIUM SERPL-MCNC: 9.6 MG/DL (ref 8.3–10.1)
CHLORIDE SERPL-SCNC: 109 MMOL/L (ref 100–108)
CHOLEST SERPL-MCNC: 251 MG/DL (ref 50–200)
CO2 SERPL-SCNC: 27 MMOL/L (ref 21–32)
CREAT SERPL-MCNC: 0.78 MG/DL (ref 0.6–1.3)
EST. AVERAGE GLUCOSE BLD GHB EST-MCNC: 105 MG/DL
GFR SERPL CREATININE-BSD FRML MDRD: 81 ML/MIN/1.73SQ M
GLUCOSE P FAST SERPL-MCNC: 94 MG/DL (ref 65–99)
HBA1C MFR BLD: 5.3 % (ref 4.2–6.3)
HDLC SERPL-MCNC: 69 MG/DL
LDLC SERPL CALC-MCNC: 173 MG/DL (ref 0–100)
NONHDLC SERPL-MCNC: 182 MG/DL
POTASSIUM SERPL-SCNC: 4.2 MMOL/L (ref 3.5–5.3)
PROT SERPL-MCNC: 7.5 G/DL (ref 6.4–8.2)
SODIUM SERPL-SCNC: 141 MMOL/L (ref 136–145)
TRIGL SERPL-MCNC: 45 MG/DL

## 2019-11-03 PROCEDURE — 82306 VITAMIN D 25 HYDROXY: CPT

## 2019-11-03 PROCEDURE — 36415 COLL VENOUS BLD VENIPUNCTURE: CPT

## 2019-11-03 PROCEDURE — 80053 COMPREHEN METABOLIC PANEL: CPT

## 2019-11-03 PROCEDURE — 83036 HEMOGLOBIN GLYCOSYLATED A1C: CPT

## 2019-11-03 PROCEDURE — 80061 LIPID PANEL: CPT

## 2019-11-14 ENCOUNTER — SOCIAL WORK (OUTPATIENT)
Dept: BEHAVIORAL/MENTAL HEALTH CLINIC | Facility: CLINIC | Age: 64
End: 2019-11-14
Payer: COMMERCIAL

## 2019-11-14 DIAGNOSIS — F41.9 ANXIETY: ICD-10-CM

## 2019-11-14 PROCEDURE — 90834 PSYTX W PT 45 MINUTES: CPT | Performed by: SOCIAL WORKER

## 2019-11-15 NOTE — PSYCH
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

## 2020-01-16 ENCOUNTER — SOCIAL WORK (OUTPATIENT)
Dept: BEHAVIORAL/MENTAL HEALTH CLINIC | Facility: CLINIC | Age: 65
End: 2020-01-16
Payer: COMMERCIAL

## 2020-01-16 DIAGNOSIS — F41.9 ANXIETY: ICD-10-CM

## 2020-01-16 PROCEDURE — 90834 PSYTX W PT 45 MINUTES: CPT | Performed by: SOCIAL WORKER

## 2020-02-03 ENCOUNTER — OFFICE VISIT (OUTPATIENT)
Dept: BEHAVIORAL/MENTAL HEALTH CLINIC | Facility: CLINIC | Age: 65
End: 2020-02-03
Payer: COMMERCIAL

## 2020-02-03 DIAGNOSIS — F41.9 ANXIETY: ICD-10-CM

## 2020-02-03 PROCEDURE — 90853 GROUP PSYCHOTHERAPY: CPT | Performed by: SOCIAL WORKER

## 2020-02-03 NOTE — PSYCH
Psychotherapy Provided: Individual Psychotherapy 50 minutes     Length of time in session: 50 minutes, follow up in 2 week    Goals addressed in session: Goal 1, Goal 2 and Goal 3      Pain:      none    0    Current suicide risk : Low     D:  Catarina Cano spoke today re: her feelings re: her excitement at beginning the mindfulness program and ways that she plans to implement this concept into her daily life  She shared her progress in setting limits at work and stability in her home environment, relationships  A:  Catarina Cano continues to benefit from the support of therapy to allow her to have a place to express her thoughts feelings and fears  Denny Sen P:Sessions will continue to be used to provide support and encourage continued growth  Behavioral Health Treatment Plan [facility]: Diagnosis and Treatment Plan explained to Jaswinder Perez relates understanding diagnosis and is agreeable to Treatment Plan   Yes

## 2020-02-04 NOTE — PSYCH
Psychotherapy Provided: Group Therapy     Length of time in session:60minutes, follow up in 1 week    Goals addressed in session: Goal 1     Pain:      none    0    Current suicide risk : Low     D:  Group members engaged in introductions and review of group content, expectations  Mindfulness was defined and initial activity was begun, processed  A: Karina Ricardo was observant, participatory but quiet today as she attended the first session  P:  Homework will be assigned during all upcoming sessions and practice of techniques will be encouraged nightly  Behavioral Health Treatment Plan [facility]: Diagnosis and Treatment Plan explained to Denny Arriaza relates understanding diagnosis and is agreeable to Treatment Plan   Yes

## 2020-02-10 ENCOUNTER — OFFICE VISIT (OUTPATIENT)
Dept: BEHAVIORAL/MENTAL HEALTH CLINIC | Facility: CLINIC | Age: 65
End: 2020-02-10
Payer: COMMERCIAL

## 2020-02-10 DIAGNOSIS — F41.9 ANXIETY: ICD-10-CM

## 2020-02-10 PROCEDURE — 90853 GROUP PSYCHOTHERAPY: CPT | Performed by: SOCIAL WORKER

## 2020-02-11 NOTE — PSYCH
Psychotherapy Provided: Group Therapy     Length of time in session:60minutes, follow up in 1 week    Goals addressed in session: Goal 1     Pain:      none    0    Current suicide risk : Low     D:  Group members engaged in mindfulness body scan activity that was subsequently  Processed  Practice expectations were then discussed and reviewed  A:  Demi actively participated in the body scan as well as the discussion that followed  She appeared to enjoy the class today, but admitted it was difficult to quell her initial anxiety  P:  Homework was be assigned and practice of techniques will be encouraged nightly  Behavioral Health Treatment Plan [facility]: Diagnosis and Treatment Plan explained to Devonte Paris relates understanding diagnosis and is agreeable to Treatment Plan   Yes

## 2020-02-17 ENCOUNTER — OFFICE VISIT (OUTPATIENT)
Dept: BEHAVIORAL/MENTAL HEALTH CLINIC | Facility: CLINIC | Age: 65
End: 2020-02-17
Payer: COMMERCIAL

## 2020-02-17 DIAGNOSIS — F41.9 ANXIETY: ICD-10-CM

## 2020-02-17 PROCEDURE — 90853 GROUP PSYCHOTHERAPY: CPT | Performed by: SOCIAL WORKER

## 2020-02-18 NOTE — PSYCH
Psychotherapy Provided: Group Therapy     Length of time in session:60minutes, follow up in 1 week    Goals addressed in session: Goal 1     Pain:      none    0    Current suicide risk : Low     D:  Group members again engaged in mindfulness body scan activity that was subsequently  Processed  CBT concepts of thoughts, feelings, and perceptions were then introduced  A:  Demi actively participated in the body scan as well as the discussion that followed  She appeared to enjoy the class today, but again admitted it was difficult to complete the activity at home  P:  Homework was be assigned and practice of techniques will be encouraged nightly  Behavioral Health Treatment Plan [facility]: Diagnosis and Treatment Plan explained to Ryanne Ochoa relates understanding diagnosis and is agreeable to Treatment Plan   Yes

## 2020-02-20 ENCOUNTER — SOCIAL WORK (OUTPATIENT)
Dept: BEHAVIORAL/MENTAL HEALTH CLINIC | Facility: CLINIC | Age: 65
End: 2020-02-20
Payer: COMMERCIAL

## 2020-02-20 DIAGNOSIS — F41.9 ANXIETY: ICD-10-CM

## 2020-02-20 PROCEDURE — 90834 PSYTX W PT 45 MINUTES: CPT | Performed by: SOCIAL WORKER

## 2020-02-21 NOTE — PSYCH
Psychotherapy Provided: Individual Psychotherapy 50 minutes     Length of time in session: 50 minutes, follow up in 2 week    Goals addressed in session: Goal 1, Goal 2 and Goal 3      Pain:      none    0    Current suicide risk : Low     D:  Jp Dutton spoke today re: her feelings re: her enjoyment at attending the mindfulness program   She shared her thoughts and feelings about how she is fitting the practice into her daily life and also shared manjula re: her daughter's upcoming wedding  A:  Jp Dutton continues to benefit from the support of therapy to allow her to have a place to express her thoughts feelings and fears  Adriana Trinidad P:Sessions will continue to be used to provide support and encourage continued growth  Behavioral Health Treatment Plan [facility]: Diagnosis and Treatment Plan explained to Cecil Billings relates understanding diagnosis and is agreeable to Treatment Plan   Yes

## 2020-02-24 ENCOUNTER — OFFICE VISIT (OUTPATIENT)
Dept: BEHAVIORAL/MENTAL HEALTH CLINIC | Facility: CLINIC | Age: 65
End: 2020-02-24
Payer: COMMERCIAL

## 2020-02-24 DIAGNOSIS — F41.9 ANXIETY: ICD-10-CM

## 2020-02-24 PROCEDURE — 90853 GROUP PSYCHOTHERAPY: CPT | Performed by: SOCIAL WORKER

## 2020-02-25 NOTE — PSYCH
Psychotherapy Provided: Group Therapy     Length of time in session:60minutes, follow up in 1 week    Goals addressed in session: Goal 1     Pain:      none    0    Current suicide risk : Low     D:  Group members engaged in mindfulness sitting meditation activity that was subsequently processed  CBT concepts of thoughts, feelings, and perceptions were then further discussed  A:  Demi actively participated in both activities and discussions  She appeared to enjoy the class today, and also enjoys practicing in between classes  P:  Homework was be assigned and practice of techniques will be encouraged nightly  Behavioral Health Treatment Plan [facility]: Diagnosis and Treatment Plan explained to David Lo relates understanding diagnosis and is agreeable to Treatment Plan   Yes

## 2020-03-02 ENCOUNTER — OFFICE VISIT (OUTPATIENT)
Dept: BEHAVIORAL/MENTAL HEALTH CLINIC | Facility: CLINIC | Age: 65
End: 2020-03-02
Payer: COMMERCIAL

## 2020-03-02 DIAGNOSIS — F41.9 ANXIETY: ICD-10-CM

## 2020-03-02 PROCEDURE — 90853 GROUP PSYCHOTHERAPY: CPT | Performed by: SOCIAL WORKER

## 2020-03-04 NOTE — PSYCH
Psychotherapy Provided: Group Therapy     Length of time in session:60minutes, follow up in 1 week    Goals addressed in session: Goal 1     Pain:      none    0    Current suicide risk : Low     D:  Group members engaged in mindful movement activity that was subsequently processed  Home practice was also discussed  A:  Bao Christie actively participated in activitiy and discussion  She appeared to enjoy the class today, but was quiet during discussion today  P:  Homework was be assigned and practice of techniques will be encouraged nightly  Behavioral Health Treatment Plan [facility]: Diagnosis and Treatment Plan explained to Ana Pate relates understanding diagnosis and is agreeable to Treatment Plan   Yes

## 2020-03-09 ENCOUNTER — OFFICE VISIT (OUTPATIENT)
Dept: BEHAVIORAL/MENTAL HEALTH CLINIC | Facility: CLINIC | Age: 65
End: 2020-03-09
Payer: COMMERCIAL

## 2020-03-09 DIAGNOSIS — F41.9 ANXIETY: ICD-10-CM

## 2020-03-09 PROCEDURE — 90853 GROUP PSYCHOTHERAPY: CPT | Performed by: SOCIAL WORKER

## 2020-03-11 ENCOUNTER — SOCIAL WORK (OUTPATIENT)
Dept: BEHAVIORAL/MENTAL HEALTH CLINIC | Facility: CLINIC | Age: 65
End: 2020-03-11
Payer: COMMERCIAL

## 2020-03-11 DIAGNOSIS — F41.9 ANXIETY: ICD-10-CM

## 2020-03-11 PROCEDURE — 90834 PSYTX W PT 45 MINUTES: CPT | Performed by: SOCIAL WORKER

## 2020-03-11 NOTE — PSYCH
Psychotherapy Provided: Group Therapy     Length of time in session:60minutes, follow up in 1 week    Goals addressed in session: Goal 1     Pain:      none    0    Current suicide risk : Low     D:  Group members engaged in mindful breathing activity and watched a video that was subsequently processed  Progress with their home practice was also discussed  A:  Demi actively participated in activitiy and discussion  She again appeared to enjoy the class today  P:  Homework was be assigned and practice of techniques will be encouraged nightly  Behavioral Health Treatment Plan [facility]: Diagnosis and Treatment Plan explained to Karen Bajwa relates understanding diagnosis and is agreeable to Treatment Plan   Yes

## 2020-03-13 NOTE — PSYCH
Psychotherapy Provided: Individual Psychotherapy 50 minutes     Length of time in session: 50 minutes, follow up in 2 week    Goals addressed in session: Goal 1, Goal 2 and Goal 3      Pain:      none    0    Current suicide risk : Low     D:  Catarina Cano spoke further  today re: her feelings re: her enjoyment at attending the mindfulness program   She shared her concerns re: the country's response to the coronavirus and the impact that it may have on her daughter's upcoming wedding  A:  Catarina Cano continues to benefit from the support of therapy to allow her to have a place to express her thoughts feelings and fears  Denny Sen P:Sessions will continue to be used to provide support and encourage continued growth  Behavioral Health Treatment Plan [facility]: Diagnosis and Treatment Plan explained to Jaswinder Perez relates understanding diagnosis and is agreeable to Treatment Plan   Yes

## 2020-04-06 DIAGNOSIS — F32.A DEPRESSION, UNSPECIFIED DEPRESSION TYPE: ICD-10-CM

## 2020-04-30 ENCOUNTER — TELEMEDICINE (OUTPATIENT)
Dept: BEHAVIORAL/MENTAL HEALTH CLINIC | Facility: CLINIC | Age: 65
End: 2020-04-30
Payer: COMMERCIAL

## 2020-04-30 DIAGNOSIS — F41.9 ANXIETY: Primary | ICD-10-CM

## 2020-04-30 PROCEDURE — 90834 PSYTX W PT 45 MINUTES: CPT | Performed by: SOCIAL WORKER

## 2020-06-10 ENCOUNTER — TELEMEDICINE (OUTPATIENT)
Dept: BEHAVIORAL/MENTAL HEALTH CLINIC | Facility: CLINIC | Age: 65
End: 2020-06-10
Payer: COMMERCIAL

## 2020-06-10 DIAGNOSIS — F41.9 ANXIETY: Primary | ICD-10-CM

## 2020-06-10 PROCEDURE — 90834 PSYTX W PT 45 MINUTES: CPT | Performed by: SOCIAL WORKER

## 2020-06-24 ENCOUNTER — ANNUAL EXAM (OUTPATIENT)
Dept: OBGYN CLINIC | Facility: CLINIC | Age: 65
End: 2020-06-24
Payer: COMMERCIAL

## 2020-06-24 VITALS
HEIGHT: 64 IN | WEIGHT: 171 LBS | BODY MASS INDEX: 29.19 KG/M2 | DIASTOLIC BLOOD PRESSURE: 62 MMHG | SYSTOLIC BLOOD PRESSURE: 110 MMHG

## 2020-06-24 DIAGNOSIS — Z01.419 ENCNTR FOR GYN EXAM (GENERAL) (ROUTINE) W/O ABN FINDINGS: ICD-10-CM

## 2020-06-24 DIAGNOSIS — Z12.31 ENCOUNTER FOR SCREENING MAMMOGRAM FOR MALIGNANT NEOPLASM OF BREAST: ICD-10-CM

## 2020-06-24 PROCEDURE — 99396 PREV VISIT EST AGE 40-64: CPT | Performed by: PHYSICIAN ASSISTANT

## 2020-06-24 PROCEDURE — 87624 HPV HI-RISK TYP POOLED RSLT: CPT | Performed by: PHYSICIAN ASSISTANT

## 2020-06-24 PROCEDURE — G0145 SCR C/V CYTO,THINLAYER,RESCR: HCPCS | Performed by: PHYSICIAN ASSISTANT

## 2020-06-26 LAB
HPV HR 12 DNA CVX QL NAA+PROBE: NEGATIVE
HPV16 DNA CVX QL NAA+PROBE: NEGATIVE
HPV18 DNA CVX QL NAA+PROBE: NEGATIVE

## 2020-06-29 DIAGNOSIS — F32.A DEPRESSION, UNSPECIFIED DEPRESSION TYPE: ICD-10-CM

## 2020-06-29 LAB
LAB AP GYN PRIMARY INTERPRETATION: NORMAL
Lab: NORMAL

## 2020-07-13 ENCOUNTER — LAB REQUISITION (OUTPATIENT)
Dept: LAB | Facility: HOSPITAL | Age: 65
End: 2020-07-13
Payer: COMMERCIAL

## 2020-07-13 DIAGNOSIS — N39.0 URINARY TRACT INFECTION, SITE NOT SPECIFIED: ICD-10-CM

## 2020-07-13 PROCEDURE — 87086 URINE CULTURE/COLONY COUNT: CPT | Performed by: INTERNAL MEDICINE

## 2020-07-13 PROCEDURE — 87077 CULTURE AEROBIC IDENTIFY: CPT | Performed by: INTERNAL MEDICINE

## 2020-07-13 PROCEDURE — 87186 SC STD MICRODIL/AGAR DIL: CPT | Performed by: INTERNAL MEDICINE

## 2020-07-15 LAB — BACTERIA UR CULT: ABNORMAL

## 2020-08-17 DIAGNOSIS — N95.2 ATROPHIC VAGINITIS: Primary | ICD-10-CM

## 2020-08-17 PROBLEM — Z12.11 COLON CANCER SCREENING: Status: RESOLVED | Noted: 2018-08-14 | Resolved: 2020-08-17

## 2020-08-17 PROBLEM — L90.0 LICHEN SCLEROSUS: Status: ACTIVE | Noted: 2017-08-24

## 2020-08-17 NOTE — TELEPHONE ENCOUNTER
It looks like she was on Estrace cream in the past  Please confirm no personal history of blood clot or stroke   If none, ok to restart Estrace cream 1g PV twice weekly, #42 5g tube, 3 refills

## 2020-08-17 NOTE — TELEPHONE ENCOUNTER
Pt was advised by her pcp to go on Estrace cream due to her vaginal dryness which has been leading to bad uti's, is asking if you will send in rx to [facility]

## 2020-08-21 RX ORDER — ESTRADIOL 0.1 MG/G
CREAM VAGINAL
Qty: 42.5 G | Refills: 3 | Status: SHIPPED | OUTPATIENT
Start: 2020-08-21 | End: 2022-05-18 | Stop reason: SDUPTHER

## 2020-09-29 NOTE — PROGRESS NOTES
I received an email from Chester Prieto asking me to call her about a personal issue    I called and left her a message to call me back - advised to please not send emails as I do not always check my emails when I am not in the office

## 2020-10-29 ENCOUNTER — OFFICE VISIT (OUTPATIENT)
Dept: INTERNAL MEDICINE CLINIC | Facility: CLINIC | Age: 65
End: 2020-10-29
Payer: COMMERCIAL

## 2020-10-29 VITALS
TEMPERATURE: 97.4 F | BODY MASS INDEX: 29.55 KG/M2 | HEART RATE: 66 BPM | HEIGHT: 64 IN | SYSTOLIC BLOOD PRESSURE: 110 MMHG | DIASTOLIC BLOOD PRESSURE: 73 MMHG

## 2020-10-29 DIAGNOSIS — F34.1 DYSTHYMIA: ICD-10-CM

## 2020-10-29 DIAGNOSIS — R73.01 IMPAIRED FASTING BLOOD SUGAR: ICD-10-CM

## 2020-10-29 DIAGNOSIS — K59.00 CONSTIPATION, UNSPECIFIED CONSTIPATION TYPE: ICD-10-CM

## 2020-10-29 DIAGNOSIS — K21.9 GASTROESOPHAGEAL REFLUX DISEASE WITHOUT ESOPHAGITIS: Primary | ICD-10-CM

## 2020-10-29 DIAGNOSIS — E55.9 VITAMIN D DEFICIENCY, UNSPECIFIED: ICD-10-CM

## 2020-10-29 DIAGNOSIS — E78.00 PURE HYPERCHOLESTEROLEMIA: ICD-10-CM

## 2020-10-29 PROCEDURE — 99214 OFFICE O/P EST MOD 30 MIN: CPT | Performed by: INTERNAL MEDICINE

## 2020-11-05 ENCOUNTER — SOCIAL WORK (OUTPATIENT)
Dept: BEHAVIORAL/MENTAL HEALTH CLINIC | Facility: CLINIC | Age: 65
End: 2020-11-05
Payer: COMMERCIAL

## 2020-11-05 DIAGNOSIS — F41.9 ANXIETY: ICD-10-CM

## 2020-11-05 PROCEDURE — 90834 PSYTX W PT 45 MINUTES: CPT | Performed by: SOCIAL WORKER

## 2020-11-22 ENCOUNTER — HOSPITAL ENCOUNTER (OUTPATIENT)
Dept: CT IMAGING | Facility: HOSPITAL | Age: 65
Discharge: HOME | End: 2020-11-22
Attending: INTERNAL MEDICINE
Payer: COMMERCIAL

## 2020-11-22 DIAGNOSIS — E78.00 PURE HYPERCHOLESTEROLEMIA: ICD-10-CM

## 2020-11-22 PROCEDURE — 75571 CT HRT W/O DYE W/CA TEST: CPT

## 2020-11-22 PROCEDURE — G1004 CDSM NDSC: HCPCS

## 2020-12-01 ENCOUNTER — HOSPITAL ENCOUNTER (OUTPATIENT)
Dept: RADIOLOGY | Age: 65
Discharge: HOME | End: 2020-12-01
Payer: COMMERCIAL

## 2020-12-01 VITALS — BODY MASS INDEX: 28.17 KG/M2 | WEIGHT: 165 LBS | HEIGHT: 64 IN

## 2020-12-01 DIAGNOSIS — Z12.31 ENCOUNTER FOR SCREENING MAMMOGRAM FOR MALIGNANT NEOPLASM OF BREAST: ICD-10-CM

## 2020-12-01 PROCEDURE — 77063 BREAST TOMOSYNTHESIS BI: CPT

## 2020-12-01 PROCEDURE — 77067 SCR MAMMO BI INCL CAD: CPT

## 2020-12-04 ENCOUNTER — OFFICE VISIT (OUTPATIENT)
Dept: INTERNAL MEDICINE CLINIC | Facility: CLINIC | Age: 65
End: 2020-12-04
Payer: COMMERCIAL

## 2020-12-04 VITALS
BODY MASS INDEX: 28.17 KG/M2 | DIASTOLIC BLOOD PRESSURE: 72 MMHG | HEIGHT: 64 IN | WEIGHT: 165 LBS | TEMPERATURE: 98.1 F | SYSTOLIC BLOOD PRESSURE: 98 MMHG

## 2020-12-04 DIAGNOSIS — N30.00 ACUTE CYSTITIS WITHOUT HEMATURIA: Primary | ICD-10-CM

## 2020-12-04 DIAGNOSIS — N39.0 URINARY TRACT INFECTION WITH HEMATURIA, SITE UNSPECIFIED: ICD-10-CM

## 2020-12-04 DIAGNOSIS — E55.9 VITAMIN D DEFICIENCY, UNSPECIFIED: ICD-10-CM

## 2020-12-04 DIAGNOSIS — R31.9 URINARY TRACT INFECTION WITH HEMATURIA, SITE UNSPECIFIED: ICD-10-CM

## 2020-12-04 DIAGNOSIS — E78.00 PURE HYPERCHOLESTEROLEMIA: ICD-10-CM

## 2020-12-04 DIAGNOSIS — K21.9 GASTROESOPHAGEAL REFLUX DISEASE WITHOUT ESOPHAGITIS: ICD-10-CM

## 2020-12-04 PROCEDURE — 87086 URINE CULTURE/COLONY COUNT: CPT | Performed by: INTERNAL MEDICINE

## 2020-12-04 PROCEDURE — 99214 OFFICE O/P EST MOD 30 MIN: CPT | Performed by: INTERNAL MEDICINE

## 2020-12-04 PROCEDURE — 87077 CULTURE AEROBIC IDENTIFY: CPT | Performed by: INTERNAL MEDICINE

## 2020-12-04 PROCEDURE — 87186 SC STD MICRODIL/AGAR DIL: CPT | Performed by: INTERNAL MEDICINE

## 2020-12-04 RX ORDER — NITROFURANTOIN 25; 75 MG/1; MG/1
100 CAPSULE ORAL 2 TIMES DAILY
Qty: 10 CAPSULE | Refills: 0 | Status: SHIPPED | OUTPATIENT
Start: 2020-12-04 | End: 2020-12-04 | Stop reason: SDUPTHER

## 2020-12-04 RX ORDER — OMEPRAZOLE 40 MG/1
40 CAPSULE, DELAYED RELEASE ORAL DAILY
Qty: 90 CAPSULE | Refills: 0 | Status: SHIPPED | OUTPATIENT
Start: 2020-12-04 | End: 2022-02-11

## 2020-12-04 RX ORDER — NITROFURANTOIN 25; 75 MG/1; MG/1
100 CAPSULE ORAL 2 TIMES DAILY
Qty: 10 CAPSULE | Refills: 0 | Status: SHIPPED | OUTPATIENT
Start: 2020-12-04 | End: 2020-12-09

## 2020-12-04 RX ORDER — OMEPRAZOLE 40 MG/1
40 CAPSULE, DELAYED RELEASE ORAL DAILY
Qty: 90 CAPSULE | Refills: 0 | Status: SHIPPED | OUTPATIENT
Start: 2020-12-04 | End: 2020-12-04 | Stop reason: SDUPTHER

## 2020-12-07 LAB
BACTERIA UR CULT: ABNORMAL
BACTERIA UR CULT: ABNORMAL

## 2020-12-18 ENCOUNTER — IMMUNIZATIONS (OUTPATIENT)
Dept: FAMILY MEDICINE CLINIC | Facility: HOSPITAL | Age: 65
End: 2020-12-18
Payer: COMMERCIAL

## 2020-12-18 DIAGNOSIS — Z23 ENCOUNTER FOR IMMUNIZATION: ICD-10-CM

## 2020-12-18 PROCEDURE — 0001A SARS-COV-2 / COVID-19 MRNA VACCINE (PFIZER-BIONTECH) 30 MCG: CPT

## 2020-12-18 PROCEDURE — 91300 SARS-COV-2 / COVID-19 MRNA VACCINE (PFIZER-BIONTECH) 30 MCG: CPT

## 2020-12-21 ENCOUNTER — PATIENT MESSAGE (OUTPATIENT)
Dept: INTERNAL MEDICINE CLINIC | Facility: CLINIC | Age: 65
End: 2020-12-21

## 2020-12-21 DIAGNOSIS — E78.00 PURE HYPERCHOLESTEROLEMIA: Primary | ICD-10-CM

## 2020-12-21 RX ORDER — ROSUVASTATIN CALCIUM 5 MG/1
5 TABLET, COATED ORAL DAILY
Qty: 90 TABLET | Refills: 0 | Status: SHIPPED | OUTPATIENT
Start: 2020-12-21 | End: 2021-08-17 | Stop reason: SDUPTHER

## 2020-12-22 ENCOUNTER — TELEPHONE (OUTPATIENT)
Dept: DERMATOLOGY | Facility: CLINIC | Age: 65
End: 2020-12-22

## 2020-12-22 DIAGNOSIS — L30.9 HAND DERMATITIS: Primary | ICD-10-CM

## 2020-12-22 RX ORDER — CLOBETASOL PROPIONATE 0.5 MG/G
CREAM TOPICAL
Qty: 60 G | Refills: 1 | Status: SHIPPED | OUTPATIENT
Start: 2020-12-22 | End: 2021-09-01

## 2021-01-07 ENCOUNTER — IMMUNIZATIONS (OUTPATIENT)
Dept: FAMILY MEDICINE CLINIC | Facility: HOSPITAL | Age: 66
End: 2021-01-07

## 2021-01-07 DIAGNOSIS — Z23 ENCOUNTER FOR IMMUNIZATION: ICD-10-CM

## 2021-01-07 PROCEDURE — 0002A SARS-COV-2 / COVID-19 MRNA VACCINE (PFIZER-BIONTECH) 30 MCG: CPT

## 2021-01-07 PROCEDURE — 91300 SARS-COV-2 / COVID-19 MRNA VACCINE (PFIZER-BIONTECH) 30 MCG: CPT

## 2021-01-14 ENCOUNTER — SOCIAL WORK (OUTPATIENT)
Dept: BEHAVIORAL/MENTAL HEALTH CLINIC | Facility: CLINIC | Age: 66
End: 2021-01-14
Payer: COMMERCIAL

## 2021-01-14 DIAGNOSIS — F41.9 ANXIETY: ICD-10-CM

## 2021-01-14 PROCEDURE — 90834 PSYTX W PT 45 MINUTES: CPT | Performed by: SOCIAL WORKER

## 2021-01-15 NOTE — PSYCH
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  She stated that she and her  continue to do well  She admitted that she has not been working out and has gained weight (back) over the holidays, but does have a plan to return to self care  Kelvin Castellanos was also able to acknowledge that she needs to provide some space and distance for her daughter to  Work on her own issues as she is an adult      A: Gini Soto continues to benefit from the support of therapy as it allows her to  Normalization, validation and self care    P:Sessions will continue to be used to provide support particularly during this pandemic    Lalo Cordero: Diagnosis and Treatment Plan explained to Phoebe Sandhu relates understanding diagnosis and is agreeable to Treatment Plan   Yes        This note was not shared with the patient due to this is a psychotherapy note

## 2021-02-10 ENCOUNTER — TELEMEDICINE (OUTPATIENT)
Dept: INTERNAL MEDICINE CLINIC | Facility: CLINIC | Age: 66
End: 2021-02-10
Payer: COMMERCIAL

## 2021-02-10 VITALS
WEIGHT: 165 LBS | BODY MASS INDEX: 28.17 KG/M2 | DIASTOLIC BLOOD PRESSURE: 70 MMHG | HEIGHT: 64 IN | SYSTOLIC BLOOD PRESSURE: 110 MMHG | HEART RATE: 70 BPM

## 2021-02-10 DIAGNOSIS — Z20.828 EXPOSURE TO SARS-ASSOCIATED CORONAVIRUS: Primary | ICD-10-CM

## 2021-02-10 DIAGNOSIS — Z11.59 NEED FOR HEPATITIS C SCREENING TEST: ICD-10-CM

## 2021-02-10 DIAGNOSIS — R06.00 DYSPNEA, UNSPECIFIED TYPE: ICD-10-CM

## 2021-02-10 PROCEDURE — 99213 OFFICE O/P EST LOW 20 MIN: CPT | Performed by: INTERNAL MEDICINE

## 2021-02-10 NOTE — PROGRESS NOTES
COVID-19 Virtual Visit     Assessment/Plan:    Problem List Items Addressed This Visit     None      Visit Diagnoses     Exposure to SARS-associated coronavirus    -  Primary    Dyspnea, unspecified type             Disposition:     I referred patient to one of our centralized sites for a COVID-19 swab  I have spent 15 minutes directly with the patient  Greater than 50% of this time was spent in counseling/coordination of care regarding: patient and family education, importance of treatment compliance, risk factor reductions and impressions  Encounter provider Malu Burgess MD    Provider located at [address]  [facility] [id number] [phone]    Recent Visits  No visits were found meeting these conditions  Showing recent visits within past 7 days and meeting all other requirements     Today's Visits  Date Type Provider Dept   02/10/21 Telemedicine Malu Burgess MD [facility]  [facility]   Showing today's visits and meeting all other requirements     Future Appointments  No visits were found meeting these conditions  Showing future appointments within next 150 days and meeting all other requirements        Patient agrees to participate in a virtual check in via telephone or video visit instead of presenting to the office to address urgent/immediate medical needs  Patient is aware this is a billable service  After connecting through [facility], the patient was identified by name and date of birth  Manjinder Peterson was informed that this was a telemedicine visit and that the exam was being conducted confidentially over secure lines  My office door was closed  No one else was in the room  Manjinder Peterson acknowledged consent and understanding of privacy and security of the telemedicine visit   I informed the patient that I have reviewed her record in Epic and presented the opportunity for her to ask any questions regarding the visit today  The patient agreed to participate  Subjective: Alex Dakins is a 60 y o  female who is concerned about COVID-19  Patient is currently asymptomatic  Patient denies fever, chills, fatigue, malaise, congestion, rhinorrhea, sore throat, anosmia, loss of taste, cough, shortness of breath, chest tightness, abdominal pain, nausea, vomiting, diarrhea, myalgias and headaches  Exposure:   Contact with a person who is under investigation (PUI) for or who is positive for COVID-19 within the last 14 days?: Yes    Hospitalized recently for fever and/or lower respiratory symptoms?: No      Currently a healthcare worker that is involved in direct patient care?: No      Works in a special setting where the risk of COVID-19 transmission may be high? (this may include long-term care, correctional and snf facilities; homeless shelters; assisted-living facilities and group homes ): No      Resident in a special setting where the risk of COVID-19 transmission may be high? (this may include long-term care, correctional and snf facilities; homeless shelters; assisted-living facilities and group homes ): No      No results found for: SARSCOV2, [address] [facility], [address] [place] [address],[address], [place] [address]  Past Medical History:   Diagnosis Date    Anxiety     Body mass index 27 0-27 9, adult     Colon cancer screening     GERD (gastroesophageal reflux disease)     takes tums occasionaly    GERD (gastroesophageal reflux disease)     Hyperlipidemia     Viral intestinal infection      Past Surgical History:   Procedure Laterality Date    CARPAL TUNNEL RELEASE Bilateral     CHOLECYSTECTOMY      COLONOSCOPY      COLONOSCOPY N/A 10/18/2018    Procedure: COLONOSCOPY;  Surgeon: Mika Arce MD;  [facility];   Service: Gastroenterology    CYSTOSCOPY      FL CMBND ANTERPOST COLPORRAPHY W/CYSTO N/A 5/23/2016    Procedure: COLPORRHAPHY ANTERIOR POSTERIOR WITH GRAFT; ENTEROCELE REPAIR;  Surgeon: Denisse Morgan Brittanie Solis MD;  [facility];  Service: UroGynecology           WI COLONOSCOPY FLX DX W/COLLJ Som[place]jo 1978 PFRMD N/A 10/17/2018    Procedure: COLONOSCOPY;  Surgeon: Christi Goins MD;  [facility]; Service: Gastroenterology    WI CYSTOURETHROSCOPY N/A 5/23/2016    Procedure: Merl Kins;  Surgeon: Angelica Hurley MD;  [facility];  Service: UroGynecology           WI REVAGINAL PROLAPSE,SACROSP LIG N/A 5/23/2016    Procedure: COLPOPEXY VAGINAL EXTRAPERITONEAL (VEC); Surgeon: Angelica Hurley MD;  [facility];  Service: UroGynecology           WI SLING OPER STRES INCONTINENCE N/A 5/23/2016    Procedure: INSERTION PUBOVAGINAL SLING ;  Surgeon: Angelica Hurley MD;  [facility];  Service: UroGynecology           TUBAL LIGATION       Current Outpatient Medications   Medication Sig Dispense Refill    Calcium Carbonate-Vitamin D (CALCIUM 500/D PO) Take by mouth      Cholecalciferol (VITAMIN D) 2000 units CAPS Take by mouth      clobetasol (TEMOVATE) 0 05 % cream Use topically twice a day 60 g 1    Cranberry 1000 MG CAPS Take by mouth      docusate sodium (COLACE) 100 mg capsule Take 100 mg by mouth 2 (two) times a day   estradiol (ESTRACE) 0 1 mg/g vaginal cream Insert 1 g into vagina twice a week 42 5 g 3    Multiple Vitamins-Minerals (MULTIVITAL-M PO) Take by mouth      omeprazole (PriLOSEC) 40 MG capsule Take 1 capsule (40 mg total) by mouth daily 90 capsule 0    rosuvastatin (CRESTOR) 5 mg tablet Take 1 tablet (5 mg total) by mouth daily 90 tablet 0    sertraline (ZOLOFT) 50 mg tablet Take 1 tablet (50 mg total) by mouth daily 90 tablet 3     No current facility-administered medications for this visit  Allergies   Allergen Reactions    Levaquin [Levofloxacin] Swelling and Other (See Comments)     Fluid in knee    Quinolones Other (See Comments)     And levaquin       Review of Systems   Constitutional: Negative for chills, fatigue and fever     HENT: Negative for congestion, rhinorrhea and sore throat  Respiratory: Negative for cough, chest tightness and shortness of breath  Gastrointestinal: Negative for abdominal pain, diarrhea, nausea and vomiting  Musculoskeletal: Negative for myalgias  Neurological: Negative for headaches  Objective: There were no vitals filed for this visit  Physical Exam  Constitutional:       Appearance: Normal appearance  HENT:      Head: Normocephalic and atraumatic  Mouth/Throat:      Mouth: Mucous membranes are moist    Eyes:      Extraocular Movements: Extraocular movements intact  Conjunctiva/sclera: Conjunctivae normal       Pupils: Pupils are equal, round, and reactive to light  Neck:      Musculoskeletal: Normal range of motion and neck supple  Pulmonary:      Effort: Pulmonary effort is normal    Neurological:      General: No focal deficit present  Mental Status: She is alert and oriented to person, place, and time  Mental status is at baseline  Psychiatric:         Mood and Affect: Mood normal          Behavior: Behavior normal      BMI Counseling: Body mass index is 28 32 kg/m²  The BMI is above normal  Nutrition recommendations include reducing portion sizes, decreasing overall calorie intake and 3-5 servings of fruits/vegetables daily  VIRTUAL VISIT DISCLAIMER    Neno Chavira acknowledges that she has consented to an online visit or consultation  She understands that the online visit is based solely on information provided by her, and that, in the absence of a face-to-face physical evaluation by the physician, the diagnosis she receives is both limited and provisional in terms of accuracy and completeness  This is not intended to replace a full medical face-to-face evaluation by the physician  Neno Chavira understands and accepts these terms

## 2021-02-11 ENCOUNTER — APPOINTMENT (OUTPATIENT)
Dept: RADIOLOGY | Age: 66
End: 2021-02-11
Payer: COMMERCIAL

## 2021-02-11 DIAGNOSIS — R06.00 DYSPNEA, UNSPECIFIED TYPE: ICD-10-CM

## 2021-02-11 PROCEDURE — 71046 X-RAY EXAM CHEST 2 VIEWS: CPT

## 2021-02-12 DIAGNOSIS — Z20.828 EXPOSURE TO SARS-ASSOCIATED CORONAVIRUS: ICD-10-CM

## 2021-02-12 LAB — SARS-COV-2 RNA RESP QL NAA+PROBE: NEGATIVE

## 2021-02-12 PROCEDURE — U0005 INFEC AGEN DETEC AMPLI PROBE: HCPCS | Performed by: INTERNAL MEDICINE

## 2021-02-12 PROCEDURE — U0003 INFECTIOUS AGENT DETECTION BY NUCLEIC ACID (DNA OR RNA); SEVERE ACUTE RESPIRATORY SYNDROME CORONAVIRUS 2 (SARS-COV-2) (CORONAVIRUS DISEASE [COVID-19]), AMPLIFIED PROBE TECHNIQUE, MAKING USE OF HIGH THROUGHPUT TECHNOLOGIES AS DESCRIBED BY CMS-2020-01-R: HCPCS | Performed by: INTERNAL MEDICINE

## 2021-02-18 ENCOUNTER — TELEPHONE (OUTPATIENT)
Dept: INTERNAL MEDICINE CLINIC | Facility: CLINIC | Age: 66
End: 2021-02-18

## 2021-02-18 NOTE — TELEPHONE ENCOUNTER
----- Message from Rickford Goodpasture sent at 2/18/2021 10:24 AM EST -----  Regarding: Non-Urgent Medical Question  Contact: [phone]  Just curious about why you ordered a Hep C screen for me?

## 2021-03-11 ENCOUNTER — SOCIAL WORK (OUTPATIENT)
Dept: BEHAVIORAL/MENTAL HEALTH CLINIC | Facility: CLINIC | Age: 66
End: 2021-03-11
Payer: COMMERCIAL

## 2021-03-11 DIAGNOSIS — F41.9 ANXIETY: ICD-10-CM

## 2021-03-11 PROCEDURE — 90834 PSYTX W PT 45 MINUTES: CPT | Performed by: SOCIAL WORKER

## 2021-03-11 NOTE — PSYCH
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

## 2021-03-11 NOTE — BH TREATMENT PLAN
Russell De Santiagorand  1955         Date of Initial Treatment Plan: 5/30/18   Date of Current Treatment Plan: 11/5/20     Treatment Plan Number 7  Strengths/Personal Resources for Self Care: I am always even keeled in stressful situations, optimistic, humorous, pretty loyal, dependable, a good teacher, a good mentor, a good resource, a good friend, champion of the underdog!     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  I will navigate my path in the [facility]    I will eat well, go to the gym consistently         GOAL 1: Modality: Individual 1x per month   Completion Date na and The person(s) responsible for carrying out the plan is  Demi [address] [place] [address] Luke: Diagnosis and Treatment Plan explained to Phoebe Sandhu relates understanding diagnosis and is agreeable to Treatment Plan          Client Comments : Please share your thoughts, feelings, need and/or experiences regarding your treatment plan:     __________________________________________________________________    Treatment Plan done but not signed at time of office visit due to:  Plan reviewed by phone or in person  and verbal consent given due to Alfred[place] social distancing

## 2021-03-30 ENCOUNTER — APPOINTMENT (OUTPATIENT)
Dept: LAB | Age: 66
End: 2021-03-30
Payer: COMMERCIAL

## 2021-03-30 ENCOUNTER — TELEPHONE (OUTPATIENT)
Dept: INTERNAL MEDICINE CLINIC | Facility: CLINIC | Age: 66
End: 2021-03-30

## 2021-03-30 DIAGNOSIS — N30.00 ACUTE CYSTITIS WITHOUT HEMATURIA: ICD-10-CM

## 2021-03-30 DIAGNOSIS — N30.00 ACUTE CYSTITIS WITHOUT HEMATURIA: Primary | ICD-10-CM

## 2021-03-30 LAB
BACTERIA UR QL AUTO: ABNORMAL /HPF
BILIRUB UR QL STRIP: NEGATIVE
CLARITY UR: ABNORMAL
COLOR UR: YELLOW
GLUCOSE UR STRIP-MCNC: NEGATIVE MG/DL
HGB UR QL STRIP.AUTO: ABNORMAL
HYALINE CASTS #/AREA URNS LPF: ABNORMAL /LPF
KETONES UR STRIP-MCNC: NEGATIVE MG/DL
LEUKOCYTE ESTERASE UR QL STRIP: ABNORMAL
NITRITE UR QL STRIP: NEGATIVE
NON-SQ EPI CELLS URNS QL MICRO: ABNORMAL /HPF
PH UR STRIP.AUTO: 8 [PH]
PROT UR STRIP-MCNC: ABNORMAL MG/DL
RBC #/AREA URNS AUTO: ABNORMAL /HPF
SP GR UR STRIP.AUTO: 1.01 (ref 1–1.03)
UROBILINOGEN UR QL STRIP.AUTO: 0.2 E.U./DL
WBC #/AREA URNS AUTO: ABNORMAL /HPF

## 2021-03-30 PROCEDURE — 81001 URINALYSIS AUTO W/SCOPE: CPT | Performed by: INTERNAL MEDICINE

## 2021-03-30 PROCEDURE — 87086 URINE CULTURE/COLONY COUNT: CPT

## 2021-03-30 NOTE — TELEPHONE ENCOUNTER
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

## 2021-03-31 ENCOUNTER — TELEPHONE (OUTPATIENT)
Dept: INTERNAL MEDICINE CLINIC | Facility: CLINIC | Age: 66
End: 2021-03-31

## 2021-03-31 DIAGNOSIS — N30.00 ACUTE CYSTITIS WITHOUT HEMATURIA: Primary | ICD-10-CM

## 2021-03-31 RX ORDER — CEPHALEXIN 500 MG/1
500 CAPSULE ORAL EVERY 8 HOURS SCHEDULED
Qty: 9 CAPSULE | Refills: 0 | Status: SHIPPED | OUTPATIENT
Start: 2021-03-31 | End: 2021-04-03

## 2021-04-01 LAB — BACTERIA UR CULT: NORMAL

## 2021-04-08 ENCOUNTER — SOCIAL WORK (OUTPATIENT)
Dept: BEHAVIORAL/MENTAL HEALTH CLINIC | Facility: CLINIC | Age: 66
End: 2021-04-08
Payer: COMMERCIAL

## 2021-04-08 DIAGNOSIS — F41.9 ANXIETY: ICD-10-CM

## 2021-04-08 PROCEDURE — 90834 PSYTX W PT 45 MINUTES: CPT | Performed by: SOCIAL WORKER

## 2021-04-08 NOTE — BH TREATMENT PLAN
Miri Jacques  1955         Date of Initial Treatment Plan: 5/30/18   Date of Current Treatment Plan: 4/8/21     Treatment Plan Number 8  Strengths/Personal Resources for Self Care: I am always even keeled in stressful situations, optimistic, humorous, pretty loyal, dependable, a good teacher, a good mentor, a good resource, a good friend, champion of the underdog!     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  I will begin to return to life post- COVID world    I will eat well, go to the gym consistently         GOAL 1: Modality: Individual 1x per month   Completion Date na and The person(s) responsible for carrying out the plan is  Demi [address] [place] [address] Luke: Diagnosis and Treatment Plan explained to Phoebe Sandhu relates understanding diagnosis and is agreeable to Treatment Plan          Client Comments : Please share your thoughts, feelings, need and/or experiences regarding your treatment plan:     __________________________________________________________________    Treatment Plan done but not signed at time of office visit due to:  Plan reviewed by phone or in person  and verbal consent given due to Alfreda social distmiky

## 2021-04-09 NOTE — PSYCH
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  Her recent stress re: being informed she "would not be needed" for the upcoming term was processed, as was her assertive response  A: Son Acevedo continues to benefit from the support of therapy as it allows her to  Normalization, validation and self care    P:Sessions will continue to be used to provide support particularly during the next phase of her transition      Lalo Cordero: Diagnosis and Treatment Plan explained to Phoebe Sandhu relates understanding diagnosis and is agreeable to Treatment Plan   Yes        This note was not shared with the patient due to this is a psychotherapy note

## 2021-05-16 ENCOUNTER — OFFICE VISIT (OUTPATIENT)
Dept: URGENT CARE | Age: 66
End: 2021-05-16
Payer: COMMERCIAL

## 2021-05-16 VITALS
DIASTOLIC BLOOD PRESSURE: 72 MMHG | OXYGEN SATURATION: 99 % | TEMPERATURE: 98.4 F | RESPIRATION RATE: 16 BRPM | HEART RATE: 75 BPM | SYSTOLIC BLOOD PRESSURE: 119 MMHG

## 2021-05-16 DIAGNOSIS — R39.9 UTI SYMPTOMS: ICD-10-CM

## 2021-05-16 DIAGNOSIS — N39.0 ACUTE UTI: Primary | ICD-10-CM

## 2021-05-16 LAB
SL AMB  POCT GLUCOSE, UA: ABNORMAL
SL AMB LEUKOCYTE ESTERASE,UA: ABNORMAL
SL AMB POCT BILIRUBIN,UA: ABNORMAL
SL AMB POCT BLOOD,UA: ABNORMAL
SL AMB POCT CLARITY,UA: ABNORMAL
SL AMB POCT COLOR,UA: ABNORMAL
SL AMB POCT KETONES,UA: ABNORMAL
SL AMB POCT NITRITE,UA: ABNORMAL
SL AMB POCT PH,UA: 6
SL AMB POCT SPECIFIC GRAVITY,UA: 1.02
SL AMB POCT URINE PROTEIN: 300
SL AMB POCT UROBILINOGEN: 0.2

## 2021-05-16 PROCEDURE — 81002 URINALYSIS NONAUTO W/O SCOPE: CPT | Performed by: PHYSICIAN ASSISTANT

## 2021-05-16 PROCEDURE — G0382 LEV 3 HOSP TYPE B ED VISIT: HCPCS | Performed by: PHYSICIAN ASSISTANT

## 2021-05-16 PROCEDURE — 87186 SC STD MICRODIL/AGAR DIL: CPT | Performed by: PHYSICIAN ASSISTANT

## 2021-05-16 PROCEDURE — 87086 URINE CULTURE/COLONY COUNT: CPT | Performed by: PHYSICIAN ASSISTANT

## 2021-05-16 RX ORDER — OMEPRAZOLE 20 MG/1
20 CAPSULE, DELAYED RELEASE ORAL DAILY
COMMUNITY
End: 2021-08-17

## 2021-05-16 RX ORDER — CEPHALEXIN 500 MG/1
500 CAPSULE ORAL EVERY 12 HOURS SCHEDULED
Qty: 10 CAPSULE | Refills: 0 | Status: SHIPPED | OUTPATIENT
Start: 2021-05-16 | End: 2021-05-21

## 2021-05-16 NOTE — PROGRESS NOTES
[address]        NAME: Maureen Anna is a 73 y o  female  : 1955    MRN: [id number]  DATE: May 16, 2021  TIME: 4:39 PM    Assessment and Plan   Acute UTI [N39 0]  1  Acute UTI  cephalexin (KEFLEX) 500 mg capsule    Urine culture   2  UTI symptoms  POCT urine dip      urine dip cloudy with pinkish hue, no obvious blood or clots  Moderate leukocytes, large blood, negative nitrites    Urine culture pending    Patient Instructions     Take all antibiotics as prescribed  Drink lots of clear fluids  Call us in 2 days for urine culture results  Follow-up with PCP, OB/Gyn or Urology in the next few days for reexamination and to ensure resolution of symptoms  Go to ER if worsening symptoms, fevers, back pain, abdominal pain, flank pain, nausea, vomiting or other concerning symptoms     Chief Complaint     Chief Complaint   Patient presents with    Possible UTI     pt presents with painful urination and urgency started 2-3 days ago; spotted blood in urine today         History of Present Illness        67-year-old female presents with "UTI" the past 2-3 days  States she was having some burning with urination and urgency  States today she noticed some blood in the urine  She denies any clots  States no current bleeding  She denies any pelvic/vaginal bleeding, discharge or pain  She denies any blood thinner use  She states she has tried cranberry pills with little relief  She states it feels like her previous UTIs  She states Keflex has always worked in the past   She denies any back, flank, abdominal pain, nausea, vomiting, fevers, chest pain, shortness of breath or other complaints  Review of Systems   Review of Systems   Constitutional: Negative for activity change, appetite change, chills, fatigue and fever  HENT: Negative  Respiratory: Negative for shortness of breath  Cardiovascular: Negative for chest pain     Gastrointestinal: Negative for abdominal pain, diarrhea, nausea and vomiting  Genitourinary: Positive for dysuria, frequency, hematuria and urgency  Negative for decreased urine volume, flank pain, genital sores, pelvic pain, vaginal bleeding, vaginal discharge and vaginal pain  Musculoskeletal: Negative for back pain and myalgias  Neurological: Negative for dizziness, weakness and light-headedness  All other systems reviewed and are negative          Current Medications       Current Outpatient Medications:     Calcium Carbonate-Vitamin D (CALCIUM 500/D PO), Take by mouth, Disp: , Rfl:     Cholecalciferol (VITAMIN D) 2000 units CAPS, Take by mouth, Disp: , Rfl:     clobetasol (TEMOVATE) 0 05 % cream, Use topically twice a day, Disp: 60 g, Rfl: 1    Cranberry 1000 MG CAPS, Take by mouth, Disp: , Rfl:     docusate sodium (COLACE) 100 mg capsule, Take 100 mg by mouth 2 (two) times a day , Disp: , Rfl:     estradiol (ESTRACE) 0 1 mg/g vaginal cream, Insert 1 g into vagina twice a week, Disp: 42 5 g, Rfl: 3    Multiple Vitamins-Minerals (MULTIVITAL-M PO), Take by mouth, Disp: , Rfl:     omeprazole (PriLOSEC) 20 mg delayed release capsule, Take 20 mg by mouth daily, Disp: , Rfl:     rosuvastatin (CRESTOR) 5 mg tablet, Take 1 tablet (5 mg total) by mouth daily, Disp: 90 tablet, Rfl: 0    sertraline (ZOLOFT) 50 mg tablet, Take 1 tablet (50 mg total) by mouth daily, Disp: 90 tablet, Rfl: 3    cephalexin (KEFLEX) 500 mg capsule, Take 1 capsule (500 mg total) by mouth every 12 (twelve) hours for 5 days, Disp: 10 capsule, Rfl: 0    omeprazole (PriLOSEC) 40 MG capsule, Take 1 capsule (40 mg total) by mouth daily, Disp: 90 capsule, Rfl: 0    Current Allergies     Allergies as of 05/16/2021 - Reviewed 05/16/2021   Allergen Reaction Noted    Levaquin [levofloxacin] Swelling and Other (See Comments) 05/20/2016    Quinolones Other (See Comments) 05/20/2016            The following portions of the patient's history were reviewed and updated as appropriate: allergies, current medications, past family history, past medical history, past social history, past surgical history and problem list      Past Medical History:   Diagnosis Date    Anxiety     Body mass index 27 0-27 9, adult     Colon cancer screening     Depression     GERD (gastroesophageal reflux disease)     takes tums occasionaly    GERD (gastroesophageal reflux disease)     Hyperlipidemia     Viral intestinal infection        Past Surgical History:   Procedure Laterality Date    CARPAL TUNNEL RELEASE Bilateral     CHOLECYSTECTOMY  08/2011    COLONOSCOPY      COLONOSCOPY N/A 10/18/2018    Procedure: COLONOSCOPY;  Surgeon: Barbara Nino MD;  Location: [facility]; Service: Gastroenterology    CYSTOSCOPY      SC CMBND ANTERPOST COLPORRAPHY W/CYSTO N/A 5/23/2016    Procedure: COLPORRHAPHY ANTERIOR POSTERIOR WITH GRAFT; ENTEROCELE REPAIR;  Surgeon: Dori Morton MD;  [facility];  Service: UroGynecology           SC COLONOSCOPY FLX DX W/COLLJ [address] PFRMD N/A 10/17/2018    Procedure: COLONOSCOPY;  Surgeon: Barbara Nino MD;  [facility]; Service: Gastroenterology    SC CYSTOURETHROSCOPY N/A 5/23/2016    Procedure: Neelima Jackson;  Surgeon: Dori Morton MD;  [facility];  Service: UroGynecology           SC REVAGINAL PROLAPSE,SACROSP LIG N/A 5/23/2016    Procedure: COLPOPEXY VAGINAL EXTRAPERITONEAL (VEC);   Surgeon: Dori Morton MD;  [facility];  Service: UroGynecology           SC SLING OPER STRES INCONTINENCE N/A 5/23/2016    Procedure: INSERTION PUBOVAGINAL SLING ;  Surgeon: Dori Morton MD;  [facility];  Service: UroGynecology           TUBAL LIGATION         Family History   Problem Relation Age of Onset    Endometrial cancer Mother 76    COPD Mother     Coronary artery disease Father     No Known Problems Sister     No Known Problems Daughter     No Known Problems Maternal Grandmother     No Known Problems Maternal Grandfather     No Known Problems Paternal Grandmother     No Known Problems Paternal Grandfather     No Known Problems Sister     No Known Problems Maternal Aunt     No Known Problems Maternal Aunt     No Known Problems Maternal Aunt     No Known Problems Maternal Aunt     No Known Problems Paternal Aunt     No Known Problems Brother          Medications have been verified  Objective   /72   Pulse 75   Temp 98 4 °F (36 9 °C)   Resp 16   SpO2 99%        Physical Exam     Physical Exam  Vitals signs and nursing note reviewed  Constitutional:       General: She is not in acute distress  Appearance: She is well-developed  She is not ill-appearing or toxic-appearing  HENT:      Head: Normocephalic and atraumatic  Cardiovascular:      Rate and Rhythm: Normal rate and regular rhythm  Heart sounds: Normal heart sounds  Pulmonary:      Effort: Pulmonary effort is normal       Breath sounds: Normal breath sounds  No wheezing  Abdominal:      General: Bowel sounds are normal       Palpations: Abdomen is soft  Tenderness: There is no abdominal tenderness  There is no right CVA tenderness, left CVA tenderness, guarding or rebound  Comments: No CVA tenderness   Skin:     Capillary Refill: Capillary refill takes less than 2 seconds  Neurological:      Mental Status: She is alert and oriented to person, place, and time     Psychiatric:         Behavior: Behavior normal

## 2021-05-16 NOTE — PATIENT INSTRUCTIONS
Take all antibiotics as prescribed  Drink lots of clear fluids  Call us in 2 days for urine culture results  Follow-up with PCP, OB/Gyn or Urology in the next few days for reexamination and to ensure resolution of symptoms  Go to ER if worsening symptoms, fevers, back pain, abdominal pain, flank pain, nausea, vomiting or other concerning symptoms     Urinary Tract Infection in Women   WHAT YOU NEED TO KNOW:   A urinary tract infection (UTI) is caused by bacteria that get inside your urinary tract  Most bacteria that enter your urinary tract come out when you urinate  If the bacteria stay in your urinary tract, you may get an infection  Your urinary tract includes your kidneys, ureters, bladder, and urethra  Urine is made in your kidneys, and it flows from the ureters to the bladder  Urine leaves the bladder through the urethra  A UTI is more common in your lower urinary tract, which includes your bladder and urethra  DISCHARGE INSTRUCTIONS:   Return to the emergency department if:   · You are urinating very little or not at all  · You have a high fever with shaking chills  · You have side or back pain that gets worse  Call your doctor if:   · You have a fever  · You do not feel better after 2 days of taking antibiotics  · You are vomiting  · You have questions or concerns about your condition or care  Medicines:   · Antibiotics  help fight a bacterial infection  If you have UTIs often (called recurrent UTIs), you may be given antibiotics to take regularly  You will be given directions for when and how to use antibiotics  The goal is to prevent UTIs but not cause antibiotic resistance by using antibiotics too often  · Medicines  may be given to decrease pain and burning when you urinate  They will also help decrease the feeling that you need to urinate often  These medicines will make your urine orange or red  · Take your medicine as directed    Contact your healthcare provider if you think your medicine is not helping or if you have side effects  Tell him or her if you are allergic to any medicine  Keep a list of the medicines, vitamins, and herbs you take  Include the amounts, and when and why you take them  Bring the list or the pill bottles to follow-up visits  Carry your medicine list with you in case of an emergency  Follow up with your healthcare provider as directed:  Write down your questions so you remember to ask them during your visits  Prevent another UTI:   · Empty your bladder often  Urinate and empty your bladder as soon as you feel the need  Do not hold your urine for long periods of time  · Wipe from front to back after you urinate or have a bowel movement  This will help prevent germs from getting into your urinary tract through your urethra  · Drink liquids as directed  Ask how much liquid to drink each day and which liquids are best for you  You may need to drink more liquids than usual to help flush out the bacteria  Do not drink alcohol, caffeine, or citrus juices  These can irritate your bladder and increase your symptoms  Your healthcare provider may recommend cranberry juice to help prevent a UTI  · Urinate after you have sex  This can help flush out bacteria passed during sex  · Do not douche or use feminine deodorants  These can change the chemical balance in your vagina  · Change sanitary pads or tampons often  This will help prevent germs from getting into your urinary tract  · Talk to your healthcare provider about your birth control method  You may need to change your method if it is increasing your risk for UTIs  · Wear cotton underwear and clothes that are loose  Tight pants and nylon underwear can trap moisture and cause bacteria to grow  · Vaginal estrogen may be recommended  This medicine helps prevent UTIs in women who have gone through menopause or are in tan-menopause  · Do pelvic muscle exercises often  Pelvic muscle exercises may help you start and stop urinating  Strong pelvic muscles may help you empty your bladder easier  Squeeze these muscles tightly for 5 seconds like you are trying to hold back urine  Then relax for 5 seconds  Gradually work up to squeezing for 10 seconds  Do 3 sets of 15 repetitions a day, or as directed  © Copyright [address] Information is for End User's use only and may not be sold, redistributed or otherwise used for commercial purposes  All illustrations and images included in CareNotes® are the copyrighted property of [employer]  or [facility]   The above information is an  only  It is not intended as medical advice for individual conditions or treatments  Talk to your doctor, nurse or pharmacist before following any medical regimen to see if it is safe and effective for you

## 2021-05-18 LAB
BACTERIA UR CULT: ABNORMAL
BACTERIA UR CULT: ABNORMAL

## 2021-06-10 ENCOUNTER — SOCIAL WORK (OUTPATIENT)
Dept: BEHAVIORAL/MENTAL HEALTH CLINIC | Facility: CLINIC | Age: 66
End: 2021-06-10
Payer: COMMERCIAL

## 2021-06-10 DIAGNOSIS — F41.9 ANXIETY: ICD-10-CM

## 2021-06-10 PROCEDURE — 90834 PSYTX W PT 45 MINUTES: CPT | Performed by: SOCIAL WORKER

## 2021-06-10 NOTE — PSYCH
Psychotherapy Provided: Individual Psychotherapy 50 minutes      Length of time in session: 50 minutes, follow up in 2 week     Goals addressed in session: Goal 1, Goal 2 and Goal 3       Pain:  none     0     Current suicide risk : Low      D: Steve Guillermo spoke today about her feelings re: her grown children and her relationship with them as well as her own parents and what growing up without her father was like  Earnest Cartwright A: Steve Guillermo continues to benefit from the support of therapy as it allows her to  Normalization, validation and self care    P:Sessions will continue to be used to provide support particularly during the next phase of her transition      Lalo Cordero: Diagnosis and Treatment Plan explained to Phoebe Sandhu relates understanding diagnosis and is agreeable to Treatment Plan   Yes        This note was not shared with the patient due to this is a psychotherapy note

## 2021-07-08 ENCOUNTER — TRANSCRIBE ORDERS (OUTPATIENT)
Dept: LAB | Facility: HOSPITAL | Age: 66
End: 2021-07-08

## 2021-07-08 ENCOUNTER — APPOINTMENT (OUTPATIENT)
Dept: LAB | Facility: HOSPITAL | Age: 66
End: 2021-07-08
Attending: INTERNAL MEDICINE
Payer: COMMERCIAL

## 2021-07-08 ENCOUNTER — APPOINTMENT (OUTPATIENT)
Dept: LAB | Facility: HOSPITAL | Age: 66
End: 2021-07-08

## 2021-07-08 DIAGNOSIS — E78.00 PURE HYPERCHOLESTEROLEMIA: ICD-10-CM

## 2021-07-08 DIAGNOSIS — Z11.59 NEED FOR HEPATITIS C SCREENING TEST: ICD-10-CM

## 2021-07-08 DIAGNOSIS — Z00.8 HEALTH EXAMINATION IN POPULATION SURVEY: Primary | ICD-10-CM

## 2021-07-08 DIAGNOSIS — R73.01 IMPAIRED FASTING BLOOD SUGAR: ICD-10-CM

## 2021-07-08 DIAGNOSIS — Z00.8 HEALTH EXAMINATION IN POPULATION SURVEY: ICD-10-CM

## 2021-07-08 LAB
ALBUMIN SERPL BCP-MCNC: 3.6 G/DL (ref 3.5–5)
ALP SERPL-CCNC: 100 U/L (ref 46–116)
ALT SERPL W P-5'-P-CCNC: 32 U/L (ref 12–78)
ANION GAP SERPL CALCULATED.3IONS-SCNC: 3 MMOL/L (ref 4–13)
AST SERPL W P-5'-P-CCNC: 23 U/L (ref 5–45)
BASOPHILS # BLD AUTO: 0.07 THOUSANDS/ΜL (ref 0–0.1)
BASOPHILS NFR BLD AUTO: 1 % (ref 0–1)
BILIRUB SERPL-MCNC: 0.65 MG/DL (ref 0.2–1)
BUN SERPL-MCNC: 18 MG/DL (ref 5–25)
CALCIUM SERPL-MCNC: 9.4 MG/DL (ref 8.3–10.1)
CHLORIDE SERPL-SCNC: 106 MMOL/L (ref 100–108)
CHOLEST SERPL-MCNC: 241 MG/DL (ref 50–200)
CO2 SERPL-SCNC: 28 MMOL/L (ref 21–32)
CREAT SERPL-MCNC: 0.71 MG/DL (ref 0.6–1.3)
EOSINOPHIL # BLD AUTO: 0.09 THOUSAND/ΜL (ref 0–0.61)
EOSINOPHIL NFR BLD AUTO: 1 % (ref 0–6)
ERYTHROCYTE [DISTWIDTH] IN BLOOD BY AUTOMATED COUNT: 13.5 % (ref 11.6–15.1)
EST. AVERAGE GLUCOSE BLD GHB EST-MCNC: 117 MG/DL
GFR SERPL CREATININE-BSD FRML MDRD: 90 ML/MIN/1.73SQ M
GLUCOSE SERPL-MCNC: 92 MG/DL (ref 65–140)
HBA1C MFR BLD: 5.7 %
HCT VFR BLD AUTO: 42.8 % (ref 34.8–46.1)
HCV AB SER QL: NORMAL
HDLC SERPL-MCNC: 64 MG/DL
HGB BLD-MCNC: 14 G/DL (ref 11.5–15.4)
IMM GRANULOCYTES # BLD AUTO: 0.03 THOUSAND/UL (ref 0–0.2)
IMM GRANULOCYTES NFR BLD AUTO: 0 % (ref 0–2)
LDLC SERPL CALC-MCNC: 153 MG/DL (ref 0–100)
LYMPHOCYTES # BLD AUTO: 2.48 THOUSANDS/ΜL (ref 0.6–4.47)
LYMPHOCYTES NFR BLD AUTO: 36 % (ref 14–44)
MCH RBC QN AUTO: 29.4 PG (ref 26.8–34.3)
MCHC RBC AUTO-ENTMCNC: 32.7 G/DL (ref 31.4–37.4)
MCV RBC AUTO: 90 FL (ref 82–98)
MONOCYTES # BLD AUTO: 0.36 THOUSAND/ΜL (ref 0.17–1.22)
MONOCYTES NFR BLD AUTO: 5 % (ref 4–12)
NEUTROPHILS # BLD AUTO: 3.87 THOUSANDS/ΜL (ref 1.85–7.62)
NEUTS SEG NFR BLD AUTO: 57 % (ref 43–75)
NONHDLC SERPL-MCNC: 177 MG/DL
NRBC BLD AUTO-RTO: 0 /100 WBCS
PLATELET # BLD AUTO: 259 THOUSANDS/UL (ref 149–390)
PMV BLD AUTO: 10.8 FL (ref 8.9–12.7)
POTASSIUM SERPL-SCNC: 4.1 MMOL/L (ref 3.5–5.3)
PROT SERPL-MCNC: 7.3 G/DL (ref 6.4–8.2)
RBC # BLD AUTO: 4.76 MILLION/UL (ref 3.81–5.12)
SODIUM SERPL-SCNC: 137 MMOL/L (ref 136–145)
TRIGL SERPL-MCNC: 120 MG/DL
WBC # BLD AUTO: 6.9 THOUSAND/UL (ref 4.31–10.16)

## 2021-07-08 PROCEDURE — 80061 LIPID PANEL: CPT

## 2021-07-08 PROCEDURE — 83036 HEMOGLOBIN GLYCOSYLATED A1C: CPT

## 2021-07-08 PROCEDURE — 36415 COLL VENOUS BLD VENIPUNCTURE: CPT

## 2021-07-08 PROCEDURE — 86803 HEPATITIS C AB TEST: CPT

## 2021-07-08 PROCEDURE — 85025 COMPLETE CBC W/AUTO DIFF WBC: CPT

## 2021-07-08 PROCEDURE — 80053 COMPREHEN METABOLIC PANEL: CPT

## 2021-07-15 ENCOUNTER — SOCIAL WORK (OUTPATIENT)
Dept: BEHAVIORAL/MENTAL HEALTH CLINIC | Facility: CLINIC | Age: 66
End: 2021-07-15
Payer: COMMERCIAL

## 2021-07-15 DIAGNOSIS — F41.9 ANXIETY: ICD-10-CM

## 2021-07-15 PROCEDURE — 90834 PSYTX W PT 45 MINUTES: CPT | Performed by: SOCIAL WORKER

## 2021-07-16 NOTE — PSYCH
Psychotherapy Provided: Individual Psychotherapy 50 minutes      Length of time in session: 50 minutes, follow up in 2 week     Goals addressed in session: Goal 1     Pain:  none     0     Current suicide risk : Low      D: Meryl Calhoun spoke today about her feelings re: how differently she feels about her two jobs, and how much her role has changed over the course of her career  She also shared how much she has enjoyed time with her family over the past month  A: Meryl Calhoun continues to benefit from the support of therapy as it allows her to  Normalization, validation and self care    P:Sessions will continue to be used to provide support particularly during the next phase of her transition      Lalo Cordero: Diagnosis and Treatment Plan explained to Phoebe Sandhu relates understanding diagnosis and is agreeable to Treatment Plan  Yes        This note was not shared with the patient due to this is a psychotherapy note     Encounter Diagnoses   Name Primary?     Anxiety

## 2021-07-27 DIAGNOSIS — F32.A DEPRESSION, UNSPECIFIED DEPRESSION TYPE: ICD-10-CM

## 2021-08-17 ENCOUNTER — OFFICE VISIT (OUTPATIENT)
Dept: INTERNAL MEDICINE CLINIC | Facility: CLINIC | Age: 66
End: 2021-08-17
Payer: COMMERCIAL

## 2021-08-17 VITALS
BODY MASS INDEX: 28.32 KG/M2 | HEIGHT: 64 IN | HEART RATE: 76 BPM | OXYGEN SATURATION: 97 % | DIASTOLIC BLOOD PRESSURE: 72 MMHG | TEMPERATURE: 98.2 F | SYSTOLIC BLOOD PRESSURE: 118 MMHG

## 2021-08-17 DIAGNOSIS — M25.559 HIP PAIN: ICD-10-CM

## 2021-08-17 DIAGNOSIS — M70.61 TROCHANTERIC BURSITIS OF RIGHT HIP: ICD-10-CM

## 2021-08-17 DIAGNOSIS — N95.2 ATROPHIC VAGINITIS: ICD-10-CM

## 2021-08-17 DIAGNOSIS — K44.9 HIATAL HERNIA: ICD-10-CM

## 2021-08-17 DIAGNOSIS — K21.00 GASTROESOPHAGEAL REFLUX DISEASE WITH ESOPHAGITIS WITHOUT HEMORRHAGE: Primary | ICD-10-CM

## 2021-08-17 DIAGNOSIS — E78.00 PURE HYPERCHOLESTEROLEMIA: ICD-10-CM

## 2021-08-17 DIAGNOSIS — N39.0 URINARY TRACT INFECTION WITHOUT HEMATURIA, SITE UNSPECIFIED: ICD-10-CM

## 2021-08-17 LAB
SL AMB  POCT GLUCOSE, UA: NEGATIVE
SL AMB LEUKOCYTE ESTERASE,UA: POSITIVE
SL AMB POCT BILIRUBIN,UA: NEGATIVE
SL AMB POCT BLOOD,UA: POSITIVE
SL AMB POCT CLARITY,UA: CLEAR
SL AMB POCT COLOR,UA: YELLOW
SL AMB POCT KETONES,UA: NEGATIVE
SL AMB POCT NITRITE,UA: NORMAL
SL AMB POCT PH,UA: 7
SL AMB POCT SPECIFIC GRAVITY,UA: 1
SL AMB POCT URINE PROTEIN: POSITIVE
SL AMB POCT UROBILINOGEN: NORMAL

## 2021-08-17 PROCEDURE — 87186 SC STD MICRODIL/AGAR DIL: CPT | Performed by: INTERNAL MEDICINE

## 2021-08-17 PROCEDURE — 99215 OFFICE O/P EST HI 40 MIN: CPT | Performed by: INTERNAL MEDICINE

## 2021-08-17 PROCEDURE — 87086 URINE CULTURE/COLONY COUNT: CPT | Performed by: INTERNAL MEDICINE

## 2021-08-17 PROCEDURE — 87077 CULTURE AEROBIC IDENTIFY: CPT | Performed by: INTERNAL MEDICINE

## 2021-08-17 PROCEDURE — 81002 URINALYSIS NONAUTO W/O SCOPE: CPT | Performed by: INTERNAL MEDICINE

## 2021-08-17 RX ORDER — NITROFURANTOIN 25; 75 MG/1; MG/1
100 CAPSULE ORAL 2 TIMES DAILY
Qty: 10 CAPSULE | Refills: 0 | Status: SHIPPED | OUTPATIENT
Start: 2021-08-17 | End: 2021-08-22

## 2021-08-17 RX ORDER — ROSUVASTATIN CALCIUM 5 MG/1
5 TABLET, COATED ORAL DAILY
Qty: 90 TABLET | Refills: 0 | Status: SHIPPED | OUTPATIENT
Start: 2021-08-17 | End: 2022-07-26 | Stop reason: SDUPTHER

## 2021-08-17 RX ORDER — ESTRADIOL 0.1 MG/G
1 CREAM VAGINAL DAILY
Qty: 42 G | Refills: 1 | Status: SHIPPED | OUTPATIENT
Start: 2021-08-17 | End: 2021-09-01 | Stop reason: ALTCHOICE

## 2021-08-17 RX ORDER — FAMOTIDINE 20 MG/1
20 TABLET, FILM COATED ORAL 2 TIMES DAILY
Qty: 60 TABLET | Refills: 2 | Status: SHIPPED | OUTPATIENT
Start: 2021-08-17 | End: 2022-08-03 | Stop reason: SDUPTHER

## 2021-08-17 NOTE — PROGRESS NOTES
Assessment/Plan: This is a 78-year-old lady with a history of recurrent UTI GERD hyperlipidemia anxiety depression  She complains of burning on urination and frequency  For vaginal dryness and atrophic vaginitis she was prescribed Estrace cream   She is scheduled to follow-up with gynecology in the next couple of weeks  For hiatal hernia and GERD she was started on famotidine 20 mg b i d  and referred to Gastroenterology  For trochanteric bursitis physical therapy was recommended  1  Gastroesophageal reflux disease with esophagitis without hemorrhage  -     Ambulatory referral to Gastroenterology; Future  -     famotidine (PEPCID) 20 mg tablet; Take 1 tablet (20 mg total) by mouth 2 (two) times a day    2  Pure hypercholesterolemia  -     rosuvastatin (CRESTOR) 5 mg tablet; Take 1 tablet (5 mg total) by mouth daily    3  Urinary tract infection without hematuria, site unspecified  -     POCT urine dip  -     nitrofurantoin (MACROBID) 100 mg capsule; Take 1 capsule (100 mg total) by mouth 2 (two) times a day for 5 days    4  Atrophic vaginitis  -     estradiol (ESTRACE) 0 1 mg/g vaginal cream; Insert 1 g into the vagina daily    5  Hiatal hernia    6  Hip pain  -     XR hip/pelv 2-3 vws left if performed; Future; Expected date: 08/17/2021  -     XR hip/pelv 2-3 vws right if performed; Future; Expected date: 08/17/2021    7  Trochanteric bursitis of right hip  -     Ambulatory referral to Physical Therapy; Future           1  Gastroesophageal reflux disease with esophagitis without hemorrhage    - Ambulatory referral to Gastroenterology; Future    2  Pure hypercholesterolemia    - rosuvastatin (CRESTOR) 5 mg tablet; Take 1 tablet (5 mg total) by mouth daily  Dispense: 90 tablet; Refill: 0    3  Urinary tract infection without hematuria, site unspecified    - POCT urine dip    4   Atrophic vaginitis    - estradiol (ESTRACE) 0 1 mg/g vaginal cream; Insert 1 g into the vagina daily  Dispense: 42 g; Refill: 1    5  Hiatal hernia             Subjective:      Patient ID: Oscar Soni is a 73 y o  female  This is a 76-year-old lady with a history of recurrent UTI GERD hyperlipidemia anxiety depression  She complains of burning on urination and frequency  For vaginal dryness and atrophic vaginitis she was prescribed Estrace cream   She is scheduled to follow-up with gynecology in the next couple of weeks  For hiatal hernia and GERD she was started on famotidine 20 mg b i d  and referred to Gastroenterology  For trochanteric bursitis physical therapy was recommended  The following portions of the patient's history were reviewed and updated as appropriate: She  has a past medical history of Anxiety, Body mass index 27 0-27 9, adult, Colon cancer screening, Depression, GERD (gastroesophageal reflux disease), GERD (gastroesophageal reflux disease), Hyperlipidemia, and Viral intestinal infection  She   Patient Active Problem List    Diagnosis Date Noted    Pure hypercholesterolemia 08/17/2021    GERD (gastroesophageal reflux disease)     Anxiety 06/98/7842    Lichen sclerosus 96/76/3088     She  has a past surgical history that includes Cholecystectomy (08/2011); Carpal tunnel release (Bilateral); Tubal ligation; Colonoscopy; Cystoscopy; pr revaginal prolapse,sacrosp lig (N/A, 5/23/2016); pr cmbnd anterpost colporraphy w/cysto (N/A, 5/23/2016); pr sling oper stres incontinence (N/A, 5/23/2016); pr cystourethroscopy (N/A, 5/23/2016); pr colonoscopy flx dx w/collj spec when pfrmd (N/A, 10/17/2018); and Colonoscopy (N/A, 10/18/2018)  Her family history includes COPD in her mother; Coronary artery disease in her father; Endometrial cancer (age of onset: 76) in her mother; No Known Problems in her brother, daughter, maternal aunt, maternal aunt, maternal aunt, maternal aunt, maternal grandfather, maternal grandmother, paternal aunt, paternal grandfather, paternal grandmother, sister, and sister    She  reports mouth    omeprazole (PriLOSEC) 40 MG capsule Take 1 capsule (40 mg total) by mouth daily    sertraline (ZOLOFT) 50 mg tablet Take 1 tablet (50 mg total) by mouth daily    [DISCONTINUED] rosuvastatin (CRESTOR) 5 mg tablet Take 1 tablet (5 mg total) by mouth daily    [DISCONTINUED] omeprazole (PriLOSEC) 20 mg delayed release capsule Take 20 mg by mouth daily     No current facility-administered medications on file prior to visit  She is allergic to levaquin [levofloxacin] and quinolones       Review of Systems   Constitutional: Negative for appetite change, chills, fatigue and fever  HENT: Negative for sore throat and trouble swallowing  Eyes: Negative for redness  Respiratory: Negative for shortness of breath  Cardiovascular: Negative for chest pain and palpitations  Gastrointestinal: Negative for abdominal pain, constipation and diarrhea  Increasing reflux   Genitourinary: Positive for dysuria  Negative for hematuria  Musculoskeletal: Negative for back pain and neck pain  Right hip pain   Skin: Negative for rash  Neurological: Negative for seizures, weakness and headaches  Hematological: Negative for adenopathy  Psychiatric/Behavioral: Negative for confusion  The patient is not nervous/anxious            Objective:      /72   Pulse 76   Temp 98 2 °F (36 8 °C)   Ht 5' 4" (1 626 m)   SpO2 97%   BMI 28 32 kg/m²     Recent Results (from the past 1344 hour(s))   CBC and differential    Collection Time: 07/08/21  2:24 PM   Result Value Ref Range    WBC 6 90 4 31 - 10 16 Thousand/uL    RBC 4 76 3 81 - 5 12 Million/uL    Hemoglobin 14 0 11 5 - 15 4 g/dL    Hematocrit 42 8 34 8 - 46 1 %    MCV 90 82 - 98 fL    MCH 29 4 26 8 - 34 3 pg    MCHC 32 7 31 4 - 37 4 g/dL    RDW 13 5 11 6 - 15 1 %    MPV 10 8 8 9 - 12 7 fL    Platelets 671 921 - 407 Thousands/uL    nRBC 0 /100 WBCs    Neutrophils Relative 57 43 - 75 %    Immat GRANS % 0 0 - 2 %    Lymphocytes Relative 36 14 - 44 % Monocytes Relative 5 4 - 12 %    Eosinophils Relative 1 0 - 6 %    Basophils Relative 1 0 - 1 %    Neutrophils Absolute 3 87 1 85 - 7 62 Thousands/µL    Immature Grans Absolute 0 03 0 00 - 0 20 Thousand/uL    Lymphocytes Absolute 2 48 0 60 - 4 47 Thousands/µL    Monocytes Absolute 0 36 0 17 - 1 22 Thousand/µL    Eosinophils Absolute 0 09 0 00 - 0 61 Thousand/µL    Basophils Absolute 0 07 0 00 - 0 10 Thousands/µL   Comprehensive metabolic panel    Collection Time: 07/08/21  2:24 PM   Result Value Ref Range    Sodium 137 136 - 145 mmol/L    Potassium 4 1 3 5 - 5 3 mmol/L    Chloride 106 100 - 108 mmol/L    CO2 28 21 - 32 mmol/L    ANION GAP 3 (L) 4 - 13 mmol/L    BUN 18 5 - 25 mg/dL    Creatinine 0 71 0 60 - 1 30 mg/dL    Glucose 92 65 - 140 mg/dL    Calcium 9 4 8 3 - 10 1 mg/dL    AST 23 5 - 45 U/L    ALT 32 12 - 78 U/L    Alkaline Phosphatase 100 46 - 116 U/L    Total Protein 7 3 6 4 - 8 2 g/dL    Albumin 3 6 3 5 - 5 0 g/dL    Total Bilirubin 0 65 0 20 - 1 00 mg/dL    eGFR 90 ml/min/1 73sq m   Lipid panel    Collection Time: 07/08/21  2:24 PM   Result Value Ref Range    Cholesterol 241 (H) 50 - 200 mg/dL    Triglycerides 120 <=150 mg/dL    HDL, Direct 64 >=40 mg/dL    LDL Calculated 153 (H) 0 - 100 mg/dL    Non-HDL-Chol (CHOL-HDL) 177 mg/dl   Hemoglobin A1C    Collection Time: 07/08/21  2:24 PM   Result Value Ref Range    Hemoglobin A1C 5 7 (H) Normal 3 8-5 6%; PreDiabetic 5 7-6 4%;  Diabetic >=6 5%; Glycemic control for adults with diabetes <7 0% %     mg/dl   Hepatitis C antibody    Collection Time: 07/08/21  2:24 PM   Result Value Ref Range    Hepatitis C Ab Non-reactive Non-reactive   POCT urine dip    Collection Time: 08/17/21  1:59 PM   Result Value Ref Range    LEUKOCYTE ESTERASE,UA positive     NITRITE,UA neg     SL AMB POCT UROBILINOGEN normal     POCT URINE PROTEIN positive      PH,UA 7     BLOOD,UA positive     SPECIFIC GRAVITY,UA 1 005     KETONES,UA negative     BILIRUBIN,UA negative     GLUCOSE, UA negative      COLOR,UA yellow     CLARITY,UA clear         Physical Exam  Constitutional:       General: She is not in acute distress  Appearance: Normal appearance  HENT:      Head: Normocephalic and atraumatic  Nose: Nose normal       Mouth/Throat:      Mouth: Mucous membranes are moist    Eyes:      Extraocular Movements: Extraocular movements intact  Pupils: Pupils are equal, round, and reactive to light  Cardiovascular:      Rate and Rhythm: Normal rate and regular rhythm  Pulses: Normal pulses  Heart sounds: Normal heart sounds  No murmur heard  No friction rub  Pulmonary:      Effort: Pulmonary effort is normal  No respiratory distress  Breath sounds: Normal breath sounds  No wheezing  Abdominal:      General: Abdomen is flat  Bowel sounds are normal  There is no distension  Palpations: Abdomen is soft  There is no mass  Tenderness: There is no abdominal tenderness  There is no guarding  Musculoskeletal:         General: Tenderness present  Normal range of motion  Cervical back: Normal range of motion and neck supple  Neurological:      General: No focal deficit present  Mental Status: She is alert and oriented to person, place, and time  Mental status is at baseline  Cranial Nerves: No cranial nerve deficit  Psychiatric:         Mood and Affect: Mood normal          Behavior: Behavior normal        BMI Counseling: Body mass index is 28 32 kg/m²  The BMI is above normal  Nutrition recommendations include reducing portion sizes, decreasing overall calorie intake and 3-5 servings of fruits/vegetables daily  Falls Plan of Care: Balance, strength, and gait training instructions were provided

## 2021-08-19 ENCOUNTER — SOCIAL WORK (OUTPATIENT)
Dept: BEHAVIORAL/MENTAL HEALTH CLINIC | Facility: CLINIC | Age: 66
End: 2021-08-19
Payer: COMMERCIAL

## 2021-08-19 ENCOUNTER — HOSPITAL ENCOUNTER (OUTPATIENT)
Dept: RADIOLOGY | Facility: HOSPITAL | Age: 66
Discharge: HOME | End: 2021-08-19
Attending: INTERNAL MEDICINE
Payer: COMMERCIAL

## 2021-08-19 DIAGNOSIS — F41.9 ANXIETY: ICD-10-CM

## 2021-08-19 DIAGNOSIS — M25.559 HIP PAIN: ICD-10-CM

## 2021-08-19 LAB — BACTERIA UR CULT: ABNORMAL

## 2021-08-19 PROCEDURE — 90834 PSYTX W PT 45 MINUTES: CPT | Performed by: SOCIAL WORKER

## 2021-08-19 PROCEDURE — 73522 X-RAY EXAM HIPS BI 3-4 VIEWS: CPT

## 2021-08-19 NOTE — PSYCH
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  She spoke about the stress of her recent shifts and the anxiety that she recently experienced while on one particular shift  A: Garlandvidya Hoffman continues to benefit from the support of therapy as it allows her to  Normalization, validation and self care    P:Sessions will continue to be used to provide support particularly during the next phase of her transition      Lalo Cordero: Diagnosis and Treatment Plan explained to Phoebe Sandhu relates understanding diagnosis and is agreeable to Treatment Plan  Yes        This note was not shared with the patient due to this is a psychotherapy note     Encounter Diagnoses   Name Primary?     Anxiety

## 2021-09-01 ENCOUNTER — ANNUAL EXAM (OUTPATIENT)
Dept: OBGYN CLINIC | Facility: CLINIC | Age: 66
End: 2021-09-01
Payer: COMMERCIAL

## 2021-09-01 VITALS
SYSTOLIC BLOOD PRESSURE: 122 MMHG | WEIGHT: 168 LBS | DIASTOLIC BLOOD PRESSURE: 78 MMHG | HEIGHT: 64 IN | BODY MASS INDEX: 28.68 KG/M2

## 2021-09-01 DIAGNOSIS — L90.0 LICHEN SCLEROSUS: ICD-10-CM

## 2021-09-01 DIAGNOSIS — Z12.31 ENCOUNTER FOR SCREENING MAMMOGRAM FOR MALIGNANT NEOPLASM OF BREAST: ICD-10-CM

## 2021-09-01 DIAGNOSIS — Z01.419 ENCNTR FOR GYN EXAM (GENERAL) (ROUTINE) W/O ABN FINDINGS: Primary | ICD-10-CM

## 2021-09-01 DIAGNOSIS — M85.80 OSTEOPENIA, UNSPECIFIED LOCATION: ICD-10-CM

## 2021-09-01 PROCEDURE — 99397 PER PM REEVAL EST PAT 65+ YR: CPT | Performed by: PHYSICIAN ASSISTANT

## 2021-09-01 RX ORDER — CLOBETASOL PROPIONATE 0.5 MG/G
OINTMENT TOPICAL 2 TIMES DAILY
Qty: 30 G | Refills: 0 | Status: SHIPPED | OUTPATIENT
Start: 2021-09-01

## 2021-09-01 NOTE — PROGRESS NOTES
Jose Redo   1955    CC:  Yearly exam    S:  73 y o  female here for yearly exam  She is postmenopausal and has had no vaginal bleeding  She complains of vulvar itching and dryness  She is now using Estrace cream twice weekly intravaginally and has seen no change in her itching/dryness of the vulva  She has a history of lichen sclerosus and has not been using her clobetasol  Sexual activity: She is not sexually active because of her vulvar symptoms  Last Pap: 6/24/20 neg/neg  Last Mammo: 12/1/20 neg (scheduled)  Last Colonoscopy:  10/2018 (due now)  Last DEXA:  11/3/18 Osteopenia - due now    We reviewed ASCCP guidelines for Pap testing       Family hx of breast cancer: no  Family hx of ovarian cancer: no  Family hx of colon cancer: no      Current Outpatient Medications:     Calcium Carbonate-Vitamin D (CALCIUM 500/D PO), Take by mouth, Disp: , Rfl:     Cholecalciferol (VITAMIN D) 2000 units CAPS, Take by mouth, Disp: , Rfl:     Cranberry 1000 MG CAPS, Take by mouth, Disp: , Rfl:     docusate sodium (COLACE) 100 mg capsule, Take 100 mg by mouth 2 (two) times a day , Disp: , Rfl:     estradiol (ESTRACE) 0 1 mg/g vaginal cream, Insert 1 g into vagina twice a week, Disp: 42 5 g, Rfl: 3    famotidine (PEPCID) 20 mg tablet, Take 1 tablet (20 mg total) by mouth 2 (two) times a day, Disp: 60 tablet, Rfl: 2    Multiple Vitamins-Minerals (MULTIVITAL-M PO), Take by mouth, Disp: , Rfl:     omeprazole (PriLOSEC) 40 MG capsule, Take 1 capsule (40 mg total) by mouth daily, Disp: 90 capsule, Rfl: 0    rosuvastatin (CRESTOR) 5 mg tablet, Take 1 tablet (5 mg total) by mouth daily, Disp: 90 tablet, Rfl: 0    sertraline (ZOLOFT) 50 mg tablet, Take 1 tablet (50 mg total) by mouth daily, Disp: 90 tablet, Rfl: 0    clobetasol (TEMOVATE) 0 05 % cream, Use topically twice a day (Patient not taking: Reported on 9/1/2021), Disp: 60 g, Rfl: 1  Social History     Socioeconomic History    Marital status: /Civil [employer]     Spouse name: Not on file    Number of children: Not on file    Years of education: Not on file    Highest education level: Not on file   Occupational History    Not on file   Tobacco Use    Smoking status: Never Smoker    Smokeless tobacco: Never Used   Vaping Use    Vaping Use: Never used   Substance and Sexual Activity    Alcohol use: Yes     Alcohol/week: 1 0 standard drinks     Types: 1 Cans of beer per week     Comment: weekly    Drug use: No    Sexual activity: Yes     Partners: Male   Other Topics Concern    Not on file   Social History Narrative    Not on file     Social Determinants of Health     Financial Resource Strain:     Difficulty of Paying Living Expenses:    Food Insecurity:     Worried About Running Out of Food in the Last Year:     [address] in the Last Year:    Transportation Needs:     Lack of Transportation (Medical):      Lack of Transportation (Non-Medical):    Physical Activity:     Days of Exercise per Week:     Minutes of Exercise per Session:    Stress:     Feeling of Stress :    Social Connections:     Frequency of Communication with Friends and Family:     Frequency of Social Gatherings with Friends and Family:     Attends Yazidism Services:     Active Member of Clubs or Organizations:     Attends Club or Organization Meetings:     Marital Status:    Intimate Partner Violence:     Fear of Current or Ex-Partner:     Emotionally Abused:     Physically Abused:     Sexually Abused:      Family History   Problem Relation Age of Onset    Endometrial cancer Mother 76    COPD Mother     Coronary artery disease Father     No Known Problems Sister     No Known Problems Daughter     No Known Problems Maternal Grandmother     No Known Problems Maternal Grandfather     No Known Problems Paternal Grandmother     No Known Problems Paternal Grandfather     No Known Problems Sister     No Known Problems Maternal Aunt     No Known Problems Maternal Aunt     No Known Problems Maternal Aunt     No Known Problems Maternal Aunt     No Known Problems Paternal Aunt     No Known Problems Brother      Past Medical History:   Diagnosis Date    Anxiety     Body mass index 27 0-27 9, adult     Colon cancer screening     Depression     GERD (gastroesophageal reflux disease)     takes tums occasionaly    GERD (gastroesophageal reflux disease)     Hyperlipidemia     Viral intestinal infection         Review of Systems   Respiratory: Negative  Cardiovascular: Negative  Gastrointestinal: Negative for constipation and diarrhea  Genitourinary: Negative for difficulty urinating, pelvic pain, vaginal bleeding, vaginal discharge, itching or odor  O:  Blood pressure 122/78, height 5' 3 78" (1 62 m), weight 76 2 kg (168 lb), not currently breastfeeding  Patient appears well and is not in distress  Neck is supple without masses  Breasts are symmetrical without mass, tenderness, nipple discharge, skin changes or adenopathy  Abdomen is soft and nontender without masses  External genitals show active lichen change of the posterior introitus, clitoral wan and labia minora bilaterally  Urethral meatus and urethra are normal  Bladder is normal to palpation  Vagina is normal without discharge or bleeding  Cervix is normal without discharge or lesion  Uterus is normal, mobile, nontender without palpable mass  Adnexa are normal, nontender, without palpable mass  A:   Yearly exam     Osteopenia   Lichen sclerosus   Atrophic vaginitis    P:   Pap no longer indicated  Mammo slip given   Colonoscopy due 2023   DEXA due, slip given    Continue Estrace cream twice weekly   Clobetasol ointment BID x 14-21 days then recheck in the office     RTO one year for yearly exam or sooner as needed

## 2021-09-10 ENCOUNTER — EVALUATION (OUTPATIENT)
Dept: PHYSICAL THERAPY | Facility: REHABILITATION | Age: 66
End: 2021-09-10
Payer: COMMERCIAL

## 2021-09-10 DIAGNOSIS — M70.61 TROCHANTERIC BURSITIS OF RIGHT HIP: ICD-10-CM

## 2021-09-10 PROCEDURE — 97110 THERAPEUTIC EXERCISES: CPT

## 2021-09-10 PROCEDURE — 97162 PT EVAL MOD COMPLEX 30 MIN: CPT

## 2021-09-10 NOTE — PROGRESS NOTES
PT Evaluation     Today's date: 9/10/2021  Patient name: Delia Grider  : 1955  MRN: [id number]  Referring provider: Caleb Burnham MD  Dx:   Encounter Diagnosis     ICD-10-CM    1  Trochanteric bursitis of right hip  M70 61 Ambulatory referral to Physical Therapy                  Assessment  Assessment details: Delia Grider is a pleasant 73 y o  female who presents with greater trochanter bursitis  The patient's greatest concerns are worry over not knowing what's wrong and fear of not being able to keep active  The primary movement problem is force production deficits resulting in weakness, impaired  and limiting her ability to exercise or recreation, go to work, squat to  objects from the floor, stand and walk  No further referral appears necessary at this time based upon examination results  Primary Impairments:  1) Force production deficits of the hip  2) TTP at greater trochanter  3) Balance impairments    Etiologic factors include none recalled by the patient  Impairments: activity intolerance, impaired balance, impaired physical strength and pain with function  Functional limitations: Pain with sitting, standing, walking for prolonged periods, pain with sitting, pain that wakes from sleep  Symptom irritability: moderateUnderstanding of Dx/Px/POC: good   Prognosis: good  Prognosis details: Positive prognostic indicators include positive attitude toward recovery  Negative prognostic indicators include chronicity of symptoms  Goals  In 3 weeks, patient will:  Be independent with home exercise program    Will report 50% improvement in pain    Compliant with activity modification so she is not TTP at greater trochanter    In 8 weeks, patient will:  Be independent in symptom management  Work a full shift with minimal symptoms  Increase FOTO score to 75      Plan  Plan details: Prognosis above is given PT services 2x/week tapering to 1x/week over the next 2 months and home program adherence  - NSAID education  - hip strengthening and stretching  - endurance training  - balance training    Patient would benefit from: skilled physical therapy  Planned therapy interventions: activity modification, joint mobilization, manual therapy, motor coordination training, neuromuscular re-education, patient education, self care, therapeutic activities, therapeutic exercise, graded activity, home exercise program and behavior modification  Frequency: 2x week  Duration in visits: 12  Duration in weeks: 8  Plan of Care beginning date: 9/10/2021  Plan of Care expiration date: 2021  Treatment plan discussed with: patient        Subjective Evaluation    History of Present Illness  Mechanism of injury: For a long time has had pain on and off in both hips  She is really active, working teaching, at [employer] hip is hurting more lately, last month or 2  Got some xrays, negative except for some mild OA  Its worse at night  She points to the whole the lateral aspect of the thigh then pin point to the greater troch  Walking, standing is ok if she is shifting weight  Morning is the worst and sometimes during the night  Side sleeper  Elliptical hurts more than treadmill  She is a nurse and teaches nursing students  DEXA scan a few years ago mild osteopenia and started calcium and vitamin D             Recurrent probem    Quality of life: excellent    Pain  Current pain ratin  At best pain ratin  At worst pain ratin  Location: lateral thigh, pin point to greater trochanter  Quality: dull ache  Relieving factors: ice and change in position  Aggravating factors: standing  Progression: worsening    Social Support  Stairs in house: yes   Lives with: spouse    Employment status: working (nurse, teacher)  Exercise history: Treadmill, 30 min 3x/week, weight lifting 15 minutes       Diagnostic Tests  X-ray: normal (mild OA)  Patient Goals  Patient goals for therapy: decreased pain, return to sport/leisure activities and increased strength          Objective     Palpation   Left   No palpable tenderness to the TFL  Right   No palpable tenderness to the TFL  Tenderness     Left Hip   Tenderness in the greater trochanter  Right Hip   Tenderness in the greater trochanter  Lumbar Screen  Lumbar range of motion within normal limits      Active Range of Motion   Left Hip   Normal active range of motion    Right Hip   Normal active range of motion    Passive Range of Motion   Left Hip   Normal passive range of motion    Right Hip   Normal passive range of motion    Strength/Myotome Testing     Left Hip   Planes of Motion   Flexion: 4-  Extension: 5  Abduction: 4    Right Hip   Planes of Motion   Flexion: 4-  Extension: 5  Abduction: 4    Functional Assessment        Single Leg Stance - Eyes Open   Left  Trial 1: 12 seconds    Right  Trial 1: 26 seconds             Precautions: osteopenia  Bella [id number]    Manuals 9/10                                                                Neuro Re-Ed                                                                                                        Ther Ex             Quad/HS/piriformis stretch 30s ea                                                                                                       Ther Activity                                       Gait Training                                       Modalities

## 2021-09-17 ENCOUNTER — OFFICE VISIT (OUTPATIENT)
Dept: GASTROENTEROLOGY | Facility: CLINIC | Age: 66
End: 2021-09-17
Payer: COMMERCIAL

## 2021-09-17 VITALS
SYSTOLIC BLOOD PRESSURE: 118 MMHG | HEIGHT: 63 IN | WEIGHT: 170 LBS | DIASTOLIC BLOOD PRESSURE: 70 MMHG | BODY MASS INDEX: 30.12 KG/M2 | HEART RATE: 77 BPM | TEMPERATURE: 98.1 F

## 2021-09-17 DIAGNOSIS — Z12.11 SCREENING FOR COLON CANCER: Primary | ICD-10-CM

## 2021-09-17 DIAGNOSIS — K21.00 GASTROESOPHAGEAL REFLUX DISEASE WITH ESOPHAGITIS WITHOUT HEMORRHAGE: ICD-10-CM

## 2021-09-17 DIAGNOSIS — K44.9 HIATAL HERNIA: ICD-10-CM

## 2021-09-17 PROCEDURE — 99244 OFF/OP CNSLTJ NEW/EST MOD 40: CPT | Performed by: INTERNAL MEDICINE

## 2021-09-17 RX ORDER — SODIUM, POTASSIUM,MAG SULFATES 17.5-3.13G
177 SOLUTION, RECONSTITUTED, ORAL ORAL ONCE
Qty: 177 ML | Refills: 0 | Status: SHIPPED | OUTPATIENT
Start: 2021-09-17 | End: 2021-09-17

## 2021-09-17 NOTE — PATIENT INSTRUCTIONS
Colon/egd scheduled 12/28/21 @[facility] with Dr Sabas Prado/dulcolax 2 day prep instructions given to pt by [facility] in the office

## 2021-09-17 NOTE — PROGRESS NOTES
[facility] - Outpatient Consultation  Delia Grider 73 y o  female MRN: [id number]  Encounter: [id number]          ASSESSMENT AND PLAN:      1  Gastroesophageal reflux disease with esophagitis without hemorrhage    - EGD; Future    2  Screening for colon cancer    - Colonoscopy; Future  - polyethylene glycol (GOLYTELY) 4000 mL solution; Take 4,000 mL by mouth once for 1 dose  Dispense: 4000 mL; Refill: 0    3  Hiatal hernia-this was identified on imaging study incidentally  This was characterized a small hiatal hernia     -anti-reflux measures  -EGD for further evaluation of hiatal hernia  -continue omeprazole 40 milligrams daily for now  We can reassess treatment options once endoscopic evaluation is completed  -colonoscopy for screening for colorectal cancer  Last colonoscopy was in 2018 limited by poor preparation  ______________________________________________________________________    HPI:      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 [employer] presents here for screening for colorectal cancer, management of acid reflux disease in setting of hiatal hernia  She was diagnosed with a hiatal hernia incidentally on CT scan imaging study for coronary calcium score  She has had worsening reflux symptoms likely in setting of increasing BMI  She has gained 10 pounds over the last several months  She did have a colonoscopy in 2018 was limited due to poor preparation  No prior endoscopic evaluation  She has identified triggers for acid reflux disease and has been trying to avoid them  She is here for guidance into discuss further management of acid reflux disease and to schedule colonoscopy for screening for colorectal cancer  REVIEW OF SYSTEMS:    CONSTITUTIONAL: Denies any fever, chills, rigors, and weight loss  HEENT: No earache or tinnitus  Denies hearing loss or visual disturbances  CARDIOVASCULAR: No chest pain or palpitations     RESPIRATORY: Denies any cough, hemoptysis, shortness of breath or dyspnea on exertion  GASTROINTESTINAL: As noted in the History of Present Illness  GENITOURINARY: No problems with urination  Denies any hematuria or dysuria  NEUROLOGIC: No dizziness or vertigo, denies headaches  MUSCULOSKELETAL: Denies any muscle or joint pain  SKIN: Denies skin rashes or itching  ENDOCRINE: Denies excessive thirst  Denies intolerance to heat or cold  PSYCHOSOCIAL: Denies depression or anxiety  Denies any recent memory loss  Historical Information   Past Medical History:   Diagnosis Date    Anxiety     Body mass index 27 0-27 9, adult     Colon cancer screening     Depression     GERD (gastroesophageal reflux disease)     takes tums occasionaly    GERD (gastroesophageal reflux disease)     Hyperlipidemia     Viral intestinal infection      Past Surgical History:   Procedure Laterality Date    CARPAL TUNNEL RELEASE Bilateral     CHOLECYSTECTOMY  08/2011    COLONOSCOPY      COLONOSCOPY N/A 10/18/2018    Procedure: COLONOSCOPY;  Surgeon: Markus Andrews MD;  [facility]; Service: Gastroenterology    CYSTOSCOPY      MA CMBND ANTERPOST COLPORRAPHY W/CYSTO N/A 5/23/2016    Procedure: COLPORRHAPHY ANTERIOR POSTERIOR WITH GRAFT; ENTEROCELE REPAIR;  Surgeon: Gilda Crowley MD;  [facility];  Service: UroGynecology           MA COLONOSCOPY FLX DX W/COLLJ [place] PFRMD N/A 10/17/2018    Procedure: COLONOSCOPY;  Surgeon: Markus Andrews MD;  [facility]; Service: Gastroenterology    MA CYSTOURETHROSCOPY N/A 5/23/2016    Procedure: [place];  Surgeon: Gilda Crowley MD;  [facility];  Service: UroGynecology           MA REVAGINAL PROLAPSE,SACROSP LIG N/A 5/23/2016    Procedure: COLPOPEXY VAGINAL EXTRAPERITONEAL (VEC);   Surgeon: Gilda Crowley MD;  [facility];  Service: UroGynecology           MA SLING OPER STRES INCONTINENCE N/A 5/23/2016    Procedure: INSERTION PUBOVAGINAL SLING ;  Surgeon: Rito Cranker, MD;  [facility];  Service: UroGynecology           TUBAL LIGATION       Social History   Social History     Substance and Sexual Activity   Alcohol Use Yes    Alcohol/week: 1 0 standard drinks    Types: 1 Cans of beer per week    Comment: weekly     Social History     Substance and Sexual Activity   Drug Use No     Social History     Tobacco Use   Smoking Status Never Smoker   Smokeless Tobacco Never Used     Family History   Problem Relation Age of Onset    Endometrial cancer Mother 76    COPD Mother     Coronary artery disease Father     No Known Problems Sister     No Known Problems Daughter     No Known Problems Maternal Grandmother     No Known Problems Maternal Grandfather     No Known Problems Paternal Grandmother     No Known Problems Paternal Grandfather     No Known Problems Sister     No Known Problems Maternal Aunt     No Known Problems Maternal Aunt     No Known Problems Maternal Aunt     No Known Problems Maternal Aunt     No Known Problems Paternal Aunt     No Known Problems Brother        Meds/Allergies       Current Outpatient Medications:     Calcium Carbonate-Vitamin D (CALCIUM 500/D PO)    Cholecalciferol (VITAMIN D) 2000 units CAPS    clobetasol (TEMOVATE) 0 05 % ointment    Cranberry 1000 MG CAPS    docusate sodium (COLACE) 100 mg capsule    estradiol (ESTRACE) 0 1 mg/g vaginal cream    famotidine (PEPCID) 20 mg tablet    Multiple Vitamins-Minerals (MULTIVITAL-M PO)    rosuvastatin (CRESTOR) 5 mg tablet    sertraline (ZOLOFT) 50 mg tablet    omeprazole (PriLOSEC) 40 MG capsule    polyethylene glycol (GOLYTELY) 4000 mL solution    Allergies   Allergen Reactions    Levaquin [Levofloxacin] Swelling and Other (See Comments)     Fluid in knee    Quinolones Other (See Comments)     And levaquin           Objective     Blood pressure 118/70, pulse 77, temperature 98 1 °F (36 7 °C), temperature source Tympanic, height 5' 3" (1 6 m), weight 77 1 kg (170 lb), not currently breastfeeding  Body mass index is 30 11 kg/m²  PHYSICAL EXAM:      General Appearance:   Alert, cooperative, no distress   HEENT:   Normocephalic, atraumatic, anicteric      Neck:  Supple, symmetrical, trachea midline   Lungs:   Clear to auscultation bilaterally; no rales, rhonchi or wheezing; respirations unlabored    Heart[de-identified]   Regular rate and rhythm; no murmur, rub, or gallop  Abdomen:   Soft, non-tender, non-distended; normal bowel sounds; no masses, no organomegaly    Genitalia:   Deferred    Rectal:   Deferred    Extremities:  No cyanosis, clubbing or edema    Pulses:  2+ and symmetric    Skin:  No jaundice, rashes, or lesions    Lymph nodes:  No palpable cervical lymphadenopathy        Lab Results:   No visits with results within 1 Day(s) from this visit  Latest known visit with results is:   Office Visit on 08/17/2021   Component Date Value    LEUKOCYTE ESTERASE,UA 08/17/2021 positive     NITRITE,UA 08/17/2021 neg     SL AMB POCT UROBILINOGEN 08/17/2021 normal     POCT URINE PROTEIN 08/17/2021 positive      PH,UA 08/17/2021 7     BLOOD,UA 08/17/2021 positive     SPECIFIC GRAVITY,UA 08/17/2021 1 005     KETONES,UA 08/17/2021 negative     BILIRUBIN,UA 08/17/2021 negative     GLUCOSE, UA 08/17/2021 negative      COLOR,UA 08/17/2021 yellow     CLARITY,UA 08/17/2021 clear     Urine Culture 08/17/2021 40,000-49,000 cfu/ml Escherichia coli*         Radiology Results:   XR hips bilateral 3-4 vw w pelvis if performed    Result Date: 8/20/2021  Narrative: BILATERAL HIPS AND PELVIS INDICATION:   M25 [id number]: Pain in unspecified hip  COMPARISON:  None VIEWS:  XR HIPS BILATERAL 3-4 VW W PELVIS IF PERFORMED Images: 5  FINDINGS: The bony pelvis appears intact  Degenerative changes visualized lower lumbar spine  LEFT HIP: There is no acute fracture or dislocation  Mild left hip osteoarthritis is seen  No lytic or blastic osseous lesion  Soft tissues are unremarkable   RIGHT HIP: There is no acute fracture or dislocation  Mild right hip osteoarthritis is seen  No lytic or blastic osseous lesion  Soft tissues are unremarkable  Impression: No acute osseous abnormality  Degenerative changes as described   Workstation performed: [id number]

## 2021-09-20 ENCOUNTER — OFFICE VISIT (OUTPATIENT)
Dept: PHYSICAL THERAPY | Facility: REHABILITATION | Age: 66
End: 2021-09-20
Payer: COMMERCIAL

## 2021-09-20 DIAGNOSIS — M70.61 TROCHANTERIC BURSITIS OF RIGHT HIP: Primary | ICD-10-CM

## 2021-09-20 PROCEDURE — 97110 THERAPEUTIC EXERCISES: CPT

## 2021-09-20 PROCEDURE — 97530 THERAPEUTIC ACTIVITIES: CPT

## 2021-09-20 PROCEDURE — 97112 NEUROMUSCULAR REEDUCATION: CPT

## 2021-09-20 NOTE — PROGRESS NOTES
Daily Note     Today's date: 2021  Patient name: Ana Rosa Snow  : 1955  MRN: [id number]  Referring provider: Sawyer Kyle MD  Dx:   Encounter Diagnosis     ICD-10-CM    1  Trochanteric bursitis of right hip  M70 61        Start Time:   Stop Time:   Total time in clinic (min): 45 minutes    Subjective: Pt reports that she is feeling much better  Been doing Advil regularly as written on the bottle, using the bike at the gym and stretching daily  Not sure which made the difference  Not completely resolved but much improved  Objective: See treatment diary below      Assessment: Tolerated treatment well  Patient demonstrated fatigue post treatment, exhibited good technique with therapeutic exercises and would benefit from continued PT  1:1 with Eliz Craig PT, DPT for entirety of tx  Plan: Continue per plan of care  Progress treatment as tolerated  Precautions: osteopenia  [employer] [id number]    Manuals 9/10 9/20                                                               Neuro Re-Ed             Balance  SL 3x30s           Step up             Hinge hip flexion  2x10 ytb                                                               Ther Ex             Quad/HS/piriformis stretch 30s ea 2x30s ea           Bridges  2x10           Squats  3x10           Bike  10'           Stand abduction  2x10 otb                                                  Ther Activity                                       Gait Training             Side step  x5 laps otb                        Modalities                                       In 3 weeks, patient will:  Be independent with home exercise program    Will report 50% improvement in pain   MET  Compliant with activity modification so she is not TTP at greater trochanter    In 8 weeks, patient will:  Be independent in symptom management  Work a full shift with minimal symptoms  Increase FOTO score to 75

## 2021-09-25 ENCOUNTER — IMMUNIZATIONS (OUTPATIENT)
Dept: FAMILY MEDICINE CLINIC | Facility: HOSPITAL | Age: 66
End: 2021-09-25

## 2021-09-25 DIAGNOSIS — Z23 ENCOUNTER FOR IMMUNIZATION: Primary | ICD-10-CM

## 2021-09-25 PROCEDURE — 91300 SARS-COV-2 / COVID-19 MRNA VACCINE (PFIZER-BIONTECH) 30 MCG: CPT

## 2021-09-25 PROCEDURE — 0001A SARS-COV-2 / COVID-19 MRNA VACCINE (PFIZER-BIONTECH) 30 MCG: CPT

## 2021-09-27 ENCOUNTER — OFFICE VISIT (OUTPATIENT)
Dept: PHYSICAL THERAPY | Facility: REHABILITATION | Age: 66
End: 2021-09-27
Payer: COMMERCIAL

## 2021-09-27 DIAGNOSIS — M70.61 TROCHANTERIC BURSITIS OF RIGHT HIP: Primary | ICD-10-CM

## 2021-09-27 PROCEDURE — 97110 THERAPEUTIC EXERCISES: CPT

## 2021-09-27 PROCEDURE — 97112 NEUROMUSCULAR REEDUCATION: CPT

## 2021-09-27 NOTE — PROGRESS NOTES
Daily Note     Today's date: 2021  Patient name: James White  : 1955  MRN: [id number]  Referring provider: Isis Chambers MD  Dx:   Encounter Diagnosis     ICD-10-CM    1  Trochanteric bursitis of right hip  M70 61                   Subjective: Pt reports that she is feeling much better today  She has no pain, and hasn't needed to use ice and medication as often  She notes performing her stretching program while she first wakes up, which is when she continues to have the most pain  Objective: See treatment diary below      Assessment: Tolerated treatment well  Pt was fatigued from upright strengthening exercises, and most challenged with single leg balance activities  Patient demonstrated fatigue post treatment, exhibited good technique with therapeutic exercises and would benefit from continued PT  1:1 with Marcelino Donato PT for entire treatment  Plan: Continue per plan of care  Progress treatment as tolerated  Precautions: osteopenia  [employer] [id number]    Manuals 9/10 9/20 9/27                                                              Neuro Re-Ed             Balance  SL 3x30s SL 3x30s          Step up   x10 6 inch          Hinge hip flexion  2x10 ytb                                                               Ther Ex             Quad/HS/piriformis stretch 30s ea 2x30s ea x30s ea          Bridges  2x10 2x10          Squats  3x10 3x10          Bike  10' 10'          Stand abduction  2x10 otb 2x10 otb          Prone hip ext   x10 ea                                    Ther Activity                                       Gait Training             Side step  x5 laps otb x5 laps otb                       Modalities                                       In 3 weeks, patient will:  Be independent with home exercise program    Will report 50% improvement in pain   MET  Compliant with activity modification so she is not TTP at greater trochanter    In 8 weeks, patient will:  Be independent in symptom management  Work a full shift with minimal symptoms  Increase FOTO score to 75

## 2021-10-04 ENCOUNTER — OFFICE VISIT (OUTPATIENT)
Dept: PHYSICAL THERAPY | Facility: REHABILITATION | Age: 66
End: 2021-10-04
Payer: COMMERCIAL

## 2021-10-04 DIAGNOSIS — M70.61 TROCHANTERIC BURSITIS OF RIGHT HIP: Primary | ICD-10-CM

## 2021-10-04 PROCEDURE — 97116 GAIT TRAINING THERAPY: CPT

## 2021-10-04 PROCEDURE — 97112 NEUROMUSCULAR REEDUCATION: CPT

## 2021-10-04 PROCEDURE — 97530 THERAPEUTIC ACTIVITIES: CPT

## 2021-10-04 PROCEDURE — 97110 THERAPEUTIC EXERCISES: CPT

## 2021-10-07 ENCOUNTER — SOCIAL WORK (OUTPATIENT)
Dept: BEHAVIORAL/MENTAL HEALTH CLINIC | Facility: CLINIC | Age: 66
End: 2021-10-07
Payer: COMMERCIAL

## 2021-10-07 DIAGNOSIS — F41.9 ANXIETY: ICD-10-CM

## 2021-10-07 PROCEDURE — 90834 PSYTX W PT 45 MINUTES: CPT | Performed by: SOCIAL WORKER

## 2021-10-08 ENCOUNTER — OFFICE VISIT (OUTPATIENT)
Dept: OBGYN CLINIC | Facility: CLINIC | Age: 66
End: 2021-10-08
Payer: COMMERCIAL

## 2021-10-08 VITALS — SYSTOLIC BLOOD PRESSURE: 102 MMHG | WEIGHT: 170.2 LBS | BODY MASS INDEX: 30.15 KG/M2 | DIASTOLIC BLOOD PRESSURE: 64 MMHG

## 2021-10-08 DIAGNOSIS — L90.0 LICHEN SCLEROSUS: Primary | ICD-10-CM

## 2021-10-08 PROCEDURE — 99213 OFFICE O/P EST LOW 20 MIN: CPT | Performed by: PHYSICIAN ASSISTANT

## 2021-10-11 ENCOUNTER — OFFICE VISIT (OUTPATIENT)
Dept: PHYSICAL THERAPY | Facility: REHABILITATION | Age: 66
End: 2021-10-11
Payer: COMMERCIAL

## 2021-10-11 DIAGNOSIS — M70.61 TROCHANTERIC BURSITIS OF RIGHT HIP: Primary | ICD-10-CM

## 2021-10-11 PROCEDURE — 97112 NEUROMUSCULAR REEDUCATION: CPT

## 2021-10-11 PROCEDURE — 97110 THERAPEUTIC EXERCISES: CPT

## 2021-10-18 ENCOUNTER — APPOINTMENT (OUTPATIENT)
Dept: PHYSICAL THERAPY | Facility: REHABILITATION | Age: 66
End: 2021-10-18
Payer: COMMERCIAL

## 2021-11-04 ENCOUNTER — SOCIAL WORK (OUTPATIENT)
Dept: BEHAVIORAL/MENTAL HEALTH CLINIC | Facility: CLINIC | Age: 66
End: 2021-11-04
Payer: COMMERCIAL

## 2021-11-04 DIAGNOSIS — F41.9 ANXIETY: ICD-10-CM

## 2021-11-04 PROCEDURE — 90834 PSYTX W PT 45 MINUTES: CPT | Performed by: SOCIAL WORKER

## 2021-12-09 ENCOUNTER — SOCIAL WORK (OUTPATIENT)
Dept: BEHAVIORAL/MENTAL HEALTH CLINIC | Facility: CLINIC | Age: 66
End: 2021-12-09
Payer: COMMERCIAL

## 2021-12-09 DIAGNOSIS — F41.9 ANXIETY: ICD-10-CM

## 2021-12-09 PROCEDURE — 90834 PSYTX W PT 45 MINUTES: CPT | Performed by: SOCIAL WORKER

## 2021-12-15 ENCOUNTER — TELEPHONE (OUTPATIENT)
Dept: GASTROENTEROLOGY | Facility: MEDICAL CENTER | Age: 66
End: 2021-12-15

## 2021-12-22 DIAGNOSIS — Z12.11 SCREENING FOR COLON CANCER: Primary | ICD-10-CM

## 2021-12-27 ENCOUNTER — TELEPHONE (OUTPATIENT)
Dept: GASTROENTEROLOGY | Facility: MEDICAL CENTER | Age: 66
End: 2021-12-27

## 2021-12-28 ENCOUNTER — TELEPHONE (OUTPATIENT)
Dept: GASTROENTEROLOGY | Facility: CLINIC | Age: 66
End: 2021-12-28

## 2021-12-28 ENCOUNTER — HOSPITAL ENCOUNTER (OUTPATIENT)
Dept: GASTROENTEROLOGY | Facility: MEDICAL CENTER | Age: 66
Setting detail: OUTPATIENT SURGERY
Discharge: HOME | End: 2021-12-28
Attending: INTERNAL MEDICINE | Admitting: INTERNAL MEDICINE
Payer: COMMERCIAL

## 2021-12-28 ENCOUNTER — ANESTHESIA (OUTPATIENT)
Dept: GASTROENTEROLOGY | Facility: MEDICAL CENTER | Age: 66
End: 2021-12-28

## 2021-12-28 ENCOUNTER — ANESTHESIA EVENT (OUTPATIENT)
Dept: GASTROENTEROLOGY | Facility: MEDICAL CENTER | Age: 66
End: 2021-12-28

## 2021-12-28 VITALS
RESPIRATION RATE: 20 BRPM | SYSTOLIC BLOOD PRESSURE: 98 MMHG | DIASTOLIC BLOOD PRESSURE: 56 MMHG | HEART RATE: 59 BPM | OXYGEN SATURATION: 98 %

## 2021-12-28 DIAGNOSIS — K59.04 CHRONIC IDIOPATHIC CONSTIPATION: Primary | ICD-10-CM

## 2021-12-28 DIAGNOSIS — K21.00 GASTROESOPHAGEAL REFLUX DISEASE WITH ESOPHAGITIS WITHOUT HEMORRHAGE: ICD-10-CM

## 2021-12-28 DIAGNOSIS — Z12.11 SCREENING FOR COLON CANCER: ICD-10-CM

## 2021-12-28 PROCEDURE — 45378 DIAGNOSTIC COLONOSCOPY: CPT | Performed by: INTERNAL MEDICINE

## 2021-12-28 PROCEDURE — 43235 EGD DIAGNOSTIC BRUSH WASH: CPT | Performed by: INTERNAL MEDICINE

## 2021-12-28 RX ORDER — PROPOFOL 10 MG/ML
INJECTION, EMULSION INTRAVENOUS AS NEEDED
Status: DISCONTINUED | OUTPATIENT
Start: 2021-12-28 | End: 2021-12-28

## 2021-12-28 RX ORDER — SODIUM CHLORIDE 9 MG/ML
125 INJECTION, SOLUTION INTRAVENOUS CONTINUOUS
Status: DISCONTINUED | OUTPATIENT
Start: 2021-12-28 | End: 2022-01-01 | Stop reason: HOSPADM

## 2021-12-28 RX ORDER — MIDAZOLAM HYDROCHLORIDE 2 MG/2ML
INJECTION, SOLUTION INTRAMUSCULAR; INTRAVENOUS AS NEEDED
Status: DISCONTINUED | OUTPATIENT
Start: 2021-12-28 | End: 2021-12-28

## 2021-12-28 RX ORDER — PROPOFOL 10 MG/ML
INJECTION, EMULSION INTRAVENOUS CONTINUOUS PRN
Status: DISCONTINUED | OUTPATIENT
Start: 2021-12-28 | End: 2021-12-28

## 2021-12-28 RX ORDER — LIDOCAINE HYDROCHLORIDE 20 MG/ML
INJECTION, SOLUTION EPIDURAL; INFILTRATION; INTRACAUDAL; PERINEURAL AS NEEDED
Status: DISCONTINUED | OUTPATIENT
Start: 2021-12-28 | End: 2021-12-28

## 2021-12-28 RX ADMIN — LIDOCAINE HYDROCHLORIDE 100 MG: 20 INJECTION, SOLUTION EPIDURAL; INFILTRATION; INTRACAUDAL; PERINEURAL at 08:15

## 2021-12-28 RX ADMIN — TOPICAL ANESTHETIC 1 SPRAY: 200 SPRAY DENTAL; PERIODONTAL at 08:09

## 2021-12-28 RX ADMIN — MIDAZOLAM 2 MG: 1 INJECTION INTRAMUSCULAR; INTRAVENOUS at 08:14

## 2021-12-28 RX ADMIN — SODIUM CHLORIDE 125 ML/HR: 0.9 INJECTION, SOLUTION INTRAVENOUS at 07:46

## 2021-12-28 RX ADMIN — PROPOFOL 100 MG: 10 INJECTION, EMULSION INTRAVENOUS at 08:15

## 2021-12-28 RX ADMIN — PROPOFOL 50 MG: 10 INJECTION, EMULSION INTRAVENOUS at 08:16

## 2021-12-28 RX ADMIN — PROPOFOL 190 MCG/KG/MIN: 10 INJECTION, EMULSION INTRAVENOUS at 08:15

## 2021-12-28 NOTE — DISCHARGE INSTRUCTIONS
Upper Endoscopy and Colonoscopy   WHAT YOU NEED TO KNOW:   An upper endoscopy is also called an upper gastrointestinal (GI) endoscopy, or an esophagogastroduodenoscopy (EGD)  It is a procedure to examine the inside of your esophagus, stomach, and duodenum (first part of the small intestine) with a scope  You may feel bloated, gassy, or have some abdominal discomfort after your procedure  Your throat may be sore for 24 to 36 hours  You may burp or pass gas from air that is still inside your body  A colonoscopy is a procedure to examine the inside of your colon (intestine) with a scope  Polyps or tissue growths may have been removed during your colonoscopy  It is normal to feel bloated and to have some abdominal discomfort  You should be passing gas  If you have hemorrhoids or you had polyps removed, you may have a small amount of bleeding  DISCHARGE INSTRUCTIONS:   Seek care immediately if:   · You have sudden, severe abdominal pain  · You have problems swallowing  · You have a large amount of black, sticky bowel movements or blood in your bowel movements  · You have sudden trouble breathing  · You feel weak, lightheaded, or faint or your heart beats faster than normal for you  Contact your healthcare provider if:   · You have a fever and chills  · You have nausea or are vomiting  · Your abdomen is bloated or feels full and hard  · You have abdominal pain  · You have a large amount of black, sticky bowel movements or blood in your bowel movements  · You have not had a bowel movement for 3 days after your procedure  · You have rash or hives  · You have questions or concerns about your procedure  Activity:   ·       Do not lift, strain, or run for 24 hours after your procedure  ·       Rest after your procedure  You have been given medicine to relax you  Do not drive or make important decisions until the day after your procedure   Return to your normal activity as directed  ·       Relieve gas and discomfort from bloating by lying on your right side with a heating pad on your abdomen  You may need to take short walks to help the gas move out  Eat small meals until bloating is relieved  Follow up with your healthcare provider as directed: Write down your questions so you remember to ask them during your visits  If you take a blood thinner, please review the specific instructions from your endoscopist about when you should resume it  These can be found in the Recommendation and Your Medication list sections of this After Visit Summary  Hiatal Hernia   WHAT YOU NEED TO KNOW:   What is a hiatal hernia? A hiatal hernia is a condition that causes part of your stomach to bulge through the hiatus (small opening) in your diaphragm  The part of the stomach may move up and down, or it may get trapped above the diaphragm  What increases my risk for a hiatal hernia? The exact cause of a hiatal hernia is not known  You may have been born with a large hiatus  The following may increase your risk of a hiatal hernia:  · Obesity    · Older age    · Medical conditions such as diverticulosis or esophagitis    · Previous surgery of the esophagus or stomach or trauma such as from a motor vehicle accident    What are the types of hiatal hernia? · Type I (sliding hiatal hernia): A portion of the stomach slides in and out of the hiatus  This type is the most common and usually causes gastroesophageal reflux disease (GERD)  GERD occurs when the esophageal sphincter does not close properly and causes acid reflux  The esophageal sphincter is the lower muscle of the esophagus  · Type II (paraesophageal hiatal hernia):  Type II hiatal hernia forms when a part of the stomach squeezes through the hiatus and lies next to the esophagus      · Type III (combined):  Type III hiatal hernia is a combination of a sliding and a paraesophageal hiatal hernia  · Type IV (complex paraesophageal hiatal hernia): The whole stomach, the small and large bowels, spleen, pancreas, or liver is pushed up into the chest     What are the signs and symptoms of a hiatal hernia? The most common symptom is heartburn  This usually occurs after meals and spreads to your neck, jaw, or shoulder  You may have no signs or symptoms, or you may have any of the following:  · Abdominal pain, especially in the area just above your navel    · Bitter or acid taste in your mouth    · Trouble swallowing    · Coughing or hoarseness    · Chest pain or shortness of breath that occurs after eating    · Frequent burping or hiccups    · Uncomfortable feeling of fullness after eating    How is a hiatal hernia diagnosed? · An upper GI series test  includes x-rays of your esophagus, stomach, and your small intestines  It is also called a barium swallow test  You will be given barium (a chalky liquid) to drink before the pictures are taken  This liquid helps your stomach and intestines show up better on the x-rays  An upper GI series can show if you have an ulcer, a blocked intestine, or other problems  · An endoscopy  uses a scope to see the inside of your digestive tract  A scope is a long, bendable tube with a light on the end of it  A camera may be hooked to the scope to take pictures  How is a hiatal hernia treated? Treatment depends on the type of hiatal hernia you have and on your symptoms  You may not need any treatment  You may need any of the following:  · Medicines  may be given to relieve heartburn symptoms  These medicines help to decrease or block stomach acid  You may also be given medicines that help to tighten the esophageal sphincter  · Surgery  may be done when medicines cannot control your symptoms, or other problems are present  Your healthcare provider may also suggest surgery depending on the type of hernia you have   Your healthcare provider can put your stomach back into its normal location  He or she may make the hiatus (hole) smaller and anchor your stomach in your abdomen  Fundoplication is a surgery that wraps the upper part of the stomach around the esophageal sphincter to strengthen it  How can I manage my symptoms? The following nutrition and lifestyle changes may be recommended to relieve symptoms of heartburn:     · Avoid foods that make your symptoms worse  These may include spicy foods, fruit juices, alcohol, caffeine, chocolate, and mint  · Eat several small meals during the day  Small meals give your stomach less food to digest     · Avoid lying down and bending forward after you eat  Do not eat meals 2 to 3 hours before bedtime  This decreases your risk for reflux  · Maintain a healthy weight  If you are overweight, weight loss may help relieve your symptoms  · Sleep with your head elevated  at least 6 inches  · Do not smoke  Smoking can increase your symptoms of heartburn  Call your local emergency number (911 in the [facility]) if:   · You have severe chest pain and sudden trouble breathing  When should I seek immediate care? · You have severe abdominal pain  · You try to vomit but nothing comes out (retching)  · Your bowel movements are black or bloody  · Your vomit looks like coffee grounds or has blood in it  When should I call my doctor? · Your symptoms are getting worse  · You have nausea, and you are vomiting  · You are losing weight without trying  · You have questions or concerns about your condition or care  CARE AGREEMENT:   You have the right to help plan your care  Learn about your health condition and how it may be treated  Discuss treatment options with your healthcare providers to decide what care you want to receive  You always have the right to refuse treatment  The above information is an  only  It is not intended as medical advice for individual conditions or treatments   Talk to your doctor, nurse or pharmacist before following any medical regimen to see if it is safe and effective for you  © Copyright "[employer] 2021 Information is for End User's use only and may not be sold, redistributed or otherwise used for commercial purposes   All illustrations and images included in CareNotes® are the copyrighted property of A D A M , Inc  or [address] [place] [address]

## 2021-12-28 NOTE — H&P
History and Physical - [facility]  Demetri Kumar 73 y o  female MRN: [id number]                  HPI: Demetri Kumar is a 73y o  year old female who presents for EGD and colonoscopy  EGD is for eval for GERD   Colonoscopy for screening for colorectal cancer eval     REVIEW OF SYSTEMS: Per the HPI, and otherwise unremarkable  Historical Information   Past Medical History:   Diagnosis Date    Anxiety     Body mass index 27 0-27 9, adult     Colon cancer screening     Depression     GERD (gastroesophageal reflux disease)     takes tums occasionaly    GERD (gastroesophageal reflux disease)     Hyperlipidemia     Viral intestinal infection      Past Surgical History:   Procedure Laterality Date    CARPAL TUNNEL RELEASE Bilateral     CHOLECYSTECTOMY  08/2011    COLONOSCOPY      COLONOSCOPY N/A 10/18/2018    Procedure: COLONOSCOPY;  Surgeon: Nina Contreras MD;  [facility]; Service: Gastroenterology    CYSTOSCOPY      MO CMBND ANTERPOST COLPORRAPHY W/CYSTO N/A 5/23/2016    Procedure: COLPORRHAPHY ANTERIOR POSTERIOR WITH GRAFT; ENTEROCELE REPAIR;  Surgeon: Fidel Simmons MD;  [facility];  Service: UroGynecology           MO COLONOSCOPY FLX DX W/COLLJ [address] PFRMD N/A 10/17/2018    Procedure: COLONOSCOPY;  Surgeon: Nina Contreras MD;  [facility]; Service: Gastroenterology    MO CYSTOURETHROSCOPY N/A 5/23/2016    Procedure: Hubert Schools;  Surgeon: Fidel Simmons MD;  [facility];  Service: UroGynecology           MO REVAGINAL PROLAPSE,SACROSP LIG N/A 5/23/2016    Procedure: COLPOPEXY VAGINAL EXTRAPERITONEAL (VEC);   Surgeon: Fidel Simmons MD;  [facility];  Service: UroGynecology           MO SLING OPER STRES INCONTINENCE N/A 5/23/2016    Procedure: Ariela Gagnon PUBOVAGINAL SLING ;  Surgeon: Fidel Simmons MD;  [facility];  Service: UroGynecology           TUBAL LIGATION       Social History   Social History     Substance and Sexual Activity   Alcohol Use Yes    Alcohol/week: 1 0 standard drink    Types: 1 Cans of beer per week    Comment: weekly     Social History     Substance and Sexual Activity   Drug Use No     Social History     Tobacco Use   Smoking Status Never Smoker   Smokeless Tobacco Never Used     Family History   Problem Relation Age of Onset    Endometrial cancer Mother 76    COPD Mother     Coronary artery disease Father     No Known Problems Sister     No Known Problems Daughter     No Known Problems Maternal Grandmother     No Known Problems Maternal Grandfather     No Known Problems Paternal Grandmother     No Known Problems Paternal Grandfather     No Known Problems Sister     No Known Problems Maternal Aunt     No Known Problems Maternal Aunt     No Known Problems Maternal Aunt     No Known Problems Maternal Aunt     No Known Problems Paternal Aunt     No Known Problems Brother        Meds/Allergies       Current Outpatient Medications:     bisacodyl (DULCOLAX) 5 mg EC tablet    Calcium Carbonate-Vitamin D (CALCIUM 500/D PO)    Cholecalciferol (VITAMIN D) 2000 units CAPS    clobetasol (TEMOVATE) 0 05 % ointment    Cranberry 1000 MG CAPS    docusate sodium (COLACE) 100 mg capsule    estradiol (ESTRACE) 0 1 mg/g vaginal cream    famotidine (PEPCID) 20 mg tablet    Multiple Vitamins-Minerals (MULTIVITAL-M PO)    omeprazole (PriLOSEC) 40 MG capsule    polyethylene glycol (GOLYTELY) 4000 mL solution    polyethylene glycol (GOLYTELY) 4000 mL solution    rosuvastatin (CRESTOR) 5 mg tablet    sertraline (ZOLOFT) 50 mg tablet    Allergies   Allergen Reactions    Levaquin [Levofloxacin] Swelling and Other (See Comments)     Fluid in knee    Quinolones Other (See Comments)     And levaquin       Objective     There were no vitals taken for this visit        PHYSICAL EXAM    Gen: NAD  Head: NCAT  CV: RRR  CHEST: Clear  ABD: soft, NT/ND  EXT: no edema      ASSESSMENT/PLAN:  This is a 73 y o  year old female here for EGD and colonoscopy and she is stable and optimized for her procedure

## 2022-01-02 ENCOUNTER — TELEPHONE (OUTPATIENT)
Dept: INTERNAL MEDICINE CLINIC | Facility: CLINIC | Age: 67
End: 2022-01-02

## 2022-01-02 DIAGNOSIS — N39.0 URINARY TRACT INFECTION WITH HEMATURIA, SITE UNSPECIFIED: Primary | ICD-10-CM

## 2022-01-02 DIAGNOSIS — R31.9 URINARY TRACT INFECTION WITH HEMATURIA, SITE UNSPECIFIED: Primary | ICD-10-CM

## 2022-01-02 RX ORDER — NITROFURANTOIN 25; 75 MG/1; MG/1
100 CAPSULE ORAL 2 TIMES DAILY
Qty: 10 CAPSULE | Refills: 0 | Status: SHIPPED | OUTPATIENT
Start: 2022-01-02 | End: 2022-01-07

## 2022-01-11 ENCOUNTER — HOSPITAL ENCOUNTER (OUTPATIENT)
Dept: RADIOLOGY | Facility: IMAGING CENTER | Age: 67
Discharge: HOME | End: 2022-01-11
Payer: COMMERCIAL

## 2022-01-11 VITALS — BODY MASS INDEX: 26.66 KG/M2 | WEIGHT: 160 LBS | HEIGHT: 65 IN

## 2022-01-11 DIAGNOSIS — Z12.31 ENCOUNTER FOR SCREENING MAMMOGRAM FOR MALIGNANT NEOPLASM OF BREAST: ICD-10-CM

## 2022-01-11 PROCEDURE — 77067 SCR MAMMO BI INCL CAD: CPT

## 2022-01-11 PROCEDURE — 77063 BREAST TOMOSYNTHESIS BI: CPT

## 2022-01-13 ENCOUNTER — SOCIAL WORK (OUTPATIENT)
Dept: BEHAVIORAL/MENTAL HEALTH CLINIC | Facility: CLINIC | Age: 67
End: 2022-01-13
Payer: COMMERCIAL

## 2022-01-13 DIAGNOSIS — F41.9 ANXIETY: ICD-10-CM

## 2022-01-13 PROCEDURE — 90834 PSYTX W PT 45 MINUTES: CPT | Performed by: SOCIAL WORKER

## 2022-01-13 NOTE — PSYCH
Psychotherapy Provided: Individual Psychotherapy 50 minutes      Length of time in session: 50 minutes, follow up in 2 week     Goals addressed in session: Goal 1     Pain:  none     0     Current suicide risk : Low      D: Marielle Hernandez spoke today about her feelings re: continuing to exist in the pandemic and her desire to remain content / healthy in 2022  A: Marielle Hernandez continues to benefit from the support of therapy as it allows her to  Normalization, validation and self care  She accepted this worker's support / feedback re: her 's Facebook "rants" and why avoiding reading his posts is acceptable due to her anxiety   P:Sessions will continue to be used to provide support particularly during the next phase of her transition      Lalo Cordero: Diagnosis and Treatment Plan explained to Phoebe Sandhu relates understanding diagnosis and is agreeable to Treatment Plan  Yes        This note was not shared with the patient due to this is a psychotherapy note     Encounter Diagnoses   Name Primary?     Anxiety

## 2022-02-10 ENCOUNTER — TELEPHONE (OUTPATIENT)
Dept: GASTROENTEROLOGY | Facility: CLINIC | Age: 67
End: 2022-02-10

## 2022-02-11 ENCOUNTER — OFFICE VISIT (OUTPATIENT)
Dept: GASTROENTEROLOGY | Facility: CLINIC | Age: 67
End: 2022-02-11
Payer: COMMERCIAL

## 2022-02-11 VITALS
WEIGHT: 168.4 LBS | TEMPERATURE: 97.5 F | DIASTOLIC BLOOD PRESSURE: 70 MMHG | HEIGHT: 64 IN | BODY MASS INDEX: 28.75 KG/M2 | SYSTOLIC BLOOD PRESSURE: 100 MMHG

## 2022-02-11 DIAGNOSIS — K59.04 CHRONIC IDIOPATHIC CONSTIPATION: Primary | ICD-10-CM

## 2022-02-11 DIAGNOSIS — K44.9 HIATAL HERNIA: ICD-10-CM

## 2022-02-11 DIAGNOSIS — Z12.11 SCREENING FOR COLON CANCER: ICD-10-CM

## 2022-02-11 DIAGNOSIS — Z86.010 HISTORY OF COLON POLYPS: ICD-10-CM

## 2022-02-11 DIAGNOSIS — K21.9 GASTROESOPHAGEAL REFLUX DISEASE WITHOUT ESOPHAGITIS: ICD-10-CM

## 2022-02-11 PROBLEM — Z86.0100 HISTORY OF COLON POLYPS: Status: ACTIVE | Noted: 2022-02-11

## 2022-02-11 PROCEDURE — 99214 OFFICE O/P EST MOD 30 MIN: CPT | Performed by: INTERNAL MEDICINE

## 2022-02-12 NOTE — PROGRESS NOTES
[facility]  Progress Note - Paras Davis 73 y o  female MRN: [id number]    Unit/Bed#:  Encounter: [id number]    Assessment/Plan:  1  Chronic idiopathic constipation  - polyethylene glycol (GOLYTELY) 4000 mL solution; Take 4,000 mL by mouth once for 1 dose  Dispense: 4000 mL; Refill: 0    2  Screening for colon cancer  - polyethylene glycol (GOLYTELY) 4000 mL solution; Take 4,000 mL by mouth once for 1 dose  Dispense: 4000 mL; Refill: 0    3  Gastroesophageal reflux disease without esophagitis  4  Hiatal hernia  5  History of colon polyps    -weight loss of 3-4 pounds as help significantly with acid reflux disease  -continue lifestyle and dietary change  -continue omeprazole 40 milligrams daily  -she has had history of 2 colonoscopies with poor preparation  Last colonoscopy was 2 day preparation  Recommend repeat colonoscopy with more extensive preparation with 2 days of GoLYTELY  -continue active amount of fiber in fluid intake  -Linzess resulted in excessive diarrhea so we discussed restarting Colace      Subjective:     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 [employer] presents here for screening for colorectal cancer, management of acid reflux disease in setting of hiatal hernia  She was diagnosed with a hiatal hernia incidentally on CT scan imaging study for coronary calcium score  She has had worsening reflux symptoms likely in setting of increasing BMI but symptoms significantly improve even with minimal weight loss and lifestyle changes       She did have a colonoscopy in 2018 was limited due to poor preparation  No prior endoscopic evaluation  Most recent colonoscopy even despite being on 2 day preparation was limited      She has identified triggers for acid reflux disease and has been trying to avoid them      She is here for guidance into discuss further management of acid reflux disease and plan for plan for 3rd colonoscopy      Reflux symptoms are better with weight loss of 3-4 pounds  Last endoscopy was on 12/28/2021 which showed small 2 centimeter hiatal hernia  Colonoscopy on 12/28/2021 was limited due to poor preparation  Fortunately she does appear to have constipation based on to his poor preparation but she is clinically asymptomatic  She also has bowel movements on a daily basis with Colace  Several trial of Linzess made her very uncomfortable         Objective:     Vitals: Blood pressure 100/70, temperature 97 5 °F (36 4 °C), temperature source Tympanic, height 5' 4" (1 626 m), weight 76 4 kg (168 lb 6 4 oz), not currently breastfeeding  ,Body mass index is 28 91 kg/m²  [unfilled]    Physical Exam:    GEN: wn/wd, NAD  HEENT: MMM, no cervical or supraclavicular LAD, anciteric  CV: RRR, no m/r/g  CHEST: CTA b/l, no w/r/r  ABD: +BS, soft, NT/ND, no hepatosplenomegaly  EXT: no c/c/e  SKIN: no rashes  NEURO: aaox3      Invasive Devices  Report    None                         Lab, Imaging and other studies:     No visits with results within 1 Day(s) from this visit  Latest known visit with results is:   Office Visit on 08/17/2021   Component Date Value    LEUKOCYTE ESTERASE,UA 08/17/2021 positive     NITRITE,UA 08/17/2021 neg     [facility] POCT UROBILINOGEN 08/17/2021 normal     POCT URINE PROTEIN 08/17/2021 positive      PH,UA 08/17/2021 7     BLOOD,UA 08/17/2021 positive     SPECIFIC GRAVITY,UA 08/17/2021 1 005     KETONES,UA 08/17/2021 negative     BILIRUBIN,UA 08/17/2021 negative     GLUCOSE, UA 08/17/2021 negative      COLOR,UA 08/17/2021 yellow     CLARITY,UA 08/17/2021 clear     Urine Culture 08/17/2021 40,000-49,000 cfu/ml Escherichia coli*         I have personally reviewed pertinent reports  No current facility-administered medications for this visit

## 2022-02-17 ENCOUNTER — SOCIAL WORK (OUTPATIENT)
Dept: BEHAVIORAL/MENTAL HEALTH CLINIC | Facility: CLINIC | Age: 67
End: 2022-02-17
Payer: COMMERCIAL

## 2022-02-17 DIAGNOSIS — F41.9 ANXIETY: ICD-10-CM

## 2022-02-17 PROCEDURE — 90834 PSYTX W PT 45 MINUTES: CPT | Performed by: SOCIAL WORKER

## 2022-02-17 NOTE — PSYCH
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  She also verbalized how much words of affirmation from others mean to her as she admitted that she is unable to self affirm  A: Peter Bell continues to benefit from the support of therapy as it allows her to  Normalization, validation and self care  She accepted this worker's support / feedback   P:Sessions will continue to be used to provide support particularly during the next phase of her transition      Lalo Cordero: Diagnosis and Treatment Plan explained to Phoebe Sandhu relates understanding diagnosis and is agreeable to Treatment Plan  Yes        This note was not shared with the patient due to this is a psychotherapy note     Encounter Diagnoses   Name Primary?     Anxiety

## 2022-04-04 ENCOUNTER — TELEPHONE (OUTPATIENT)
Dept: GASTROENTEROLOGY | Facility: CLINIC | Age: 67
End: 2022-04-04

## 2022-04-04 NOTE — TELEPHONE ENCOUNTER
Patients GI provider:  Dr Clarisa Segovia    Number to return call: [phone]    Reason for call: Pt calling to r/s procedure    Scheduled procedure/appointment date if applicable: Procedure - 04/12/22

## 2022-04-04 NOTE — TELEPHONE ENCOUNTER
Colon rescheduled to 7/1/22 with Dr Walter Dubois at [facility]/dulcolax 2 day prep mailed to the patient

## 2022-04-21 ENCOUNTER — SOCIAL WORK (OUTPATIENT)
Dept: BEHAVIORAL/MENTAL HEALTH CLINIC | Facility: CLINIC | Age: 67
End: 2022-04-21
Payer: COMMERCIAL

## 2022-04-21 DIAGNOSIS — F41.9 ANXIETY: ICD-10-CM

## 2022-04-21 PROCEDURE — 90834 PSYTX W PT 45 MINUTES: CPT | Performed by: SOCIAL WORKER

## 2022-04-21 NOTE — PSYCH
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 [facility]   A: Satnam Jones continues to benefit from the support of therapy as it allows her to  Normalization, validation and self care  She accepted this worker's support / feedback   P:Sessions will continue to be used to provide support particularly during the next phase of her transition      [address] [place] [address]: Diagnosis and Treatment Plan explained to Phoebe Sandhu relates understanding diagnosis and is agreeable to Treatment Plan  Yes        This note was not shared with the patient due to this is a psychotherapy note     Encounter Diagnoses   Name Primary?     Anxiety

## 2022-04-21 NOTE — PSYCH
Treatment Plan Tracking    # 1Treatment Plan not completed within required time limits due to: Sofia Stout canceled appointment last month

## 2022-04-21 NOTE — BH TREATMENT PLAN
Marie James  1955         Date of Initial Treatment Plan: 5/30/18   Date of Current Treatment Plan: 4/21/22     Treatment Plan Number 10  Strengths/Personal Resources for Self Care: I am always even keeled in stressful situations, optimistic, humorous, pretty loyal, dependable, a good teacher, a good mentor, a good resource, a good friend, champion of the underdog!     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, relaxing and traveling    I will continue to eat well, go to the gym consistently         GOAL 1: Modality: Individual 1x per month   Completion Date na and The person(s) responsible for carrying out the plan is  Demi [address] [place] [address] Luke: Diagnosis and Treatment Plan explained to Phoebe Sandhu relates understanding diagnosis and is agreeable to Treatment Plan          Client Comments : Please share your thoughts, feelings, need and/or experiences regarding your treatment plan:     __________________________________________________________________    Treatment Plan done but not signed at time of office visit due to:  Plan reviewed by phone or in person  and verbal consent given

## 2022-04-24 ENCOUNTER — OFFICE VISIT (OUTPATIENT)
Dept: URGENT CARE | Age: 67
End: 2022-04-24
Payer: COMMERCIAL

## 2022-04-24 VITALS
RESPIRATION RATE: 18 BRPM | SYSTOLIC BLOOD PRESSURE: 117 MMHG | DIASTOLIC BLOOD PRESSURE: 65 MMHG | HEART RATE: 87 BPM | TEMPERATURE: 97.7 F | OXYGEN SATURATION: 97 %

## 2022-04-24 DIAGNOSIS — R35.0 URINARY FREQUENCY: Primary | ICD-10-CM

## 2022-04-24 LAB
SL AMB  POCT GLUCOSE, UA: NEGATIVE
SL AMB LEUKOCYTE ESTERASE,UA: ABNORMAL
SL AMB POCT BILIRUBIN,UA: NEGATIVE
SL AMB POCT BLOOD,UA: ABNORMAL
SL AMB POCT CLARITY,UA: ABNORMAL
SL AMB POCT COLOR,UA: YELLOW
SL AMB POCT KETONES,UA: NEGATIVE
SL AMB POCT NITRITE,UA: NEGATIVE
SL AMB POCT PH,UA: 5
SL AMB POCT SPECIFIC GRAVITY,UA: 1
SL AMB POCT URINE PROTEIN: NEGATIVE
SL AMB POCT UROBILINOGEN: 0.2

## 2022-04-24 PROCEDURE — 99213 OFFICE O/P EST LOW 20 MIN: CPT | Performed by: NURSE PRACTITIONER

## 2022-04-24 PROCEDURE — 87186 SC STD MICRODIL/AGAR DIL: CPT | Performed by: NURSE PRACTITIONER

## 2022-04-24 PROCEDURE — 81002 URINALYSIS NONAUTO W/O SCOPE: CPT | Performed by: NURSE PRACTITIONER

## 2022-04-24 PROCEDURE — 87077 CULTURE AEROBIC IDENTIFY: CPT | Performed by: NURSE PRACTITIONER

## 2022-04-24 PROCEDURE — 87086 URINE CULTURE/COLONY COUNT: CPT | Performed by: NURSE PRACTITIONER

## 2022-04-24 RX ORDER — NITROFURANTOIN 25; 75 MG/1; MG/1
100 CAPSULE ORAL 2 TIMES DAILY
Qty: 10 CAPSULE | Refills: 0 | Status: SHIPPED | OUTPATIENT
Start: 2022-04-24

## 2022-04-24 NOTE — PROGRESS NOTES
[address]        NAME: Ricki Meza is a 78 y o  female  : 1955    MRN: [id number]  DATE: 2022  TIME: 5:02 PM    Assessment and Plan   Urinary frequency [R35 0]  1  Urinary frequency  POCT urine dip    Urine culture    nitrofurantoin (MACROBID) 100 mg capsule         Patient Instructions     Urine dip is positive for blood and leuks esterace  Will send for culture  Patient requesting macrobid; advised risk for toxicity  Given age, this is last Rx for it  Follow up with PCP in 3-5 days  Proceed to  ER if symptoms worsen  Chief Complaint     Chief Complaint   Patient presents with    Possible UTI     frequency , and vaginal dryness         History of Present Illness       HPI   Reports urinary frequency for a couple of days  Also pressure in the bladder  Says she gets UTIs about 2x/year  Review of Systems   Review of Systems   Constitutional: Negative for fever  Respiratory: Negative for shortness of breath  Genitourinary: Positive for frequency  Negative for dysuria and urgency  Musculoskeletal: Negative for back pain           Current Medications       Current Outpatient Medications:     bisacodyl (DULCOLAX) 5 mg EC tablet, Take 1 tablet (5 mg total) by mouth daily as needed for constipation, Disp: 2 tablet, Rfl: 0    Calcium Carbonate-Vitamin D (CALCIUM 500/D PO), Take by mouth, Disp: , Rfl:     Cholecalciferol (VITAMIN D) 2000 units CAPS, Take by mouth, Disp: , Rfl:     clobetasol (TEMOVATE) 0 05 % ointment, Apply topically 2 (two) times a day, Disp: 30 g, Rfl: 0    Cranberry 1000 MG CAPS, Take by mouth, Disp: , Rfl:     docusate sodium (COLACE) 100 mg capsule, Take 100 mg by mouth 2 (two) times a day , Disp: , Rfl:     estradiol (ESTRACE) 0 1 mg/g vaginal cream, Insert 1 g into vagina twice a week, Disp: 42 5 g, Rfl: 3    famotidine (PEPCID) 20 mg tablet, Take 1 tablet (20 mg total) by mouth 2 (two) times a day, Disp: 60 tablet, Rfl: 2    linaCLOtide 290 MCG CAPS, Take 1 capsule by mouth daily (Patient not taking: Reported on 2/11/2022 ), Disp: 30 capsule, Rfl: 7    Multiple Vitamins-Minerals (MULTIVITAL-M PO), Take by mouth, Disp: , Rfl:     nitrofurantoin (MACROBID) 100 mg capsule, Take 1 capsule (100 mg total) by mouth 2 (two) times a day, Disp: 10 capsule, Rfl: 0    omeprazole (PriLOSEC) 40 MG capsule, Take 1 capsule (40 mg total) by mouth daily, Disp: 90 capsule, Rfl: 0    polyethylene glycol (GOLYTELY) 4000 mL solution, Take 4,000 mL by mouth once for 1 dose (Patient not taking: Reported on 2/11/2022 ), Disp: 4000 mL, Rfl: 0    polyethylene glycol (GOLYTELY) 4000 mL solution, Take 4,000 mL by mouth once for 1 dose, Disp: 4000 mL, Rfl: 0    rosuvastatin (CRESTOR) 5 mg tablet, Take 1 tablet (5 mg total) by mouth daily, Disp: 90 tablet, Rfl: 0    sertraline (ZOLOFT) 50 mg tablet, TAKE ONE TABLET BY MOUTH EVERY DAY, Disp: 90 tablet, Rfl: 2    Current Allergies     Allergies as of 04/24/2022 - Reviewed 04/24/2022   Allergen Reaction Noted    Levaquin [levofloxacin] Swelling and Other (See Comments) 05/20/2016    Quinolones Other (See Comments) 05/20/2016            The following portions of the patient's history were reviewed and updated as appropriate: allergies, current medications, past family history, past medical history, past social history, past surgical history and problem list      Past Medical History:   Diagnosis Date    Anxiety     Body mass index 27 0-27 9, adult     Colon cancer screening     Colon polyp     Depression     GERD (gastroesophageal reflux disease)     takes tums occasionaly    GERD (gastroesophageal reflux disease)     Hyperlipidemia     Viral intestinal infection        Past Surgical History:   Procedure Laterality Date    CARPAL TUNNEL RELEASE Bilateral     CHOLECYSTECTOMY  08/2011    COLONOSCOPY      COLONOSCOPY N/A 10/18/2018    Procedure: COLONOSCOPY;  Surgeon: Mika Arce MD;  [facility];   Service: Gastroenterology    CYSTOSCOPY      TX CMBND ANTERPOST COLPORRAPHY W/CYSTO N/A 5/23/2016    Procedure: COLPORRHAPHY ANTERIOR POSTERIOR WITH GRAFT; ENTEROCELE REPAIR;  Surgeon: Lydia Orr MD;  [facility];  Service: UroGynecology           TX COLONOSCOPY FLX DX W/COLLJ [address] PFRMD N/A 10/17/2018    Procedure: COLONOSCOPY;  Surgeon: Marce Ibarra MD;  [facility]; Service: Gastroenterology    TX CYSTOURETHROSCOPY N/A 5/23/2016    Procedure: Mabeline Badder;  Surgeon: Lydia Orr MD;  [facility];  Service: UroGynecology           TX REVAGINAL PROLAPSE,SACROSP LIG N/A 5/23/2016    Procedure: COLPOPEXY VAGINAL EXTRAPERITONEAL (VEC); Surgeon: Lydia Orr MD;  [facility];  Service: UroGynecology           TX SLING OPER STRES INCONTINENCE N/A 5/23/2016    Procedure: INSERTION PUBOVAGINAL SLING ;  Surgeon: Lydia Orr MD;  [facility];  Service: UroGynecology           TUBAL LIGATION         Family History   Problem Relation Age of Onset    Endometrial cancer Mother 76    COPD Mother     Coronary artery disease Father     No Known Problems Sister     No Known Problems Daughter     No Known Problems Maternal Grandmother     No Known Problems Maternal Grandfather     No Known Problems Paternal Grandmother     No Known Problems Paternal Grandfather     No Known Problems Sister     No Known Problems Maternal Aunt     No Known Problems Maternal Aunt     No Known Problems Maternal Aunt     No Known Problems Maternal Aunt     No Known Problems Paternal Aunt     No Known Problems Brother          Medications have been verified  Objective   /65   Pulse 87   Temp 97 7 °F (36 5 °C)   Resp 18   SpO2 97%   No LMP recorded  Patient is postmenopausal        Physical Exam     Physical Exam  Constitutional:       Appearance: She is not diaphoretic  Cardiovascular:      Rate and Rhythm: Regular rhythm        Heart sounds: Normal heart sounds  Pulmonary:      Effort: Pulmonary effort is normal       Breath sounds: Normal breath sounds  Abdominal:      General: There is no distension  Tenderness: There is no abdominal tenderness  There is no right CVA tenderness, left CVA tenderness, guarding or rebound

## 2022-04-24 NOTE — PATIENT INSTRUCTIONS
Urinary Urgency and Frequency   AMBULATORY CARE:   Urinary urgency and frequency  is a condition that increases how strongly or how often you need to urinate  The condition may also be called urgency-frequency syndrome  Urinary urgency means you feel such a strong need to urinate that you have trouble waiting  You may also feel discomfort in your bladder  Urinary frequency means you need to urinate many times during the day  This may also be called increased daytime frequency  You may be woken from sleep by the need to urinate  Urgency and frequency often happen together, but you may only have one  Contact your healthcare provider if:   · Your urine is pink, or you notice blood in your urine  · You have pain with urination  · You continue to have symptoms even after you take your medicine  · You have new or worsening symptoms  · You have questions or concerns about your condition or care  Treatment  will depend on the type and cause of your urination problems  You may need any of the following:  · Medicines  may be given to relax your bladder and decrease urination  You may also need antibiotics if your symptoms are caused by a bacterial infection  · Sacral nerve stimulation  sends electrical signals to your sacral nerve through a small device implanted under your skin  Your sacral nerve controls your bladder, sphincter, and pelvic floor muscles  · Botox injections  into your bladder may help relax your bladder muscle to decrease urgency and frequency  · Surgery  may be done if all other treatments cannot help you control your bladder  Manage urinary urgency and frequency:   · Keep a record of your urination patterns for a few days  Write down the number of times you urinate over 24 hours, the amount, and if you have urine leakage  Record how strong the urge to urinate was each time  Your healthcare provider may also want you to record the type and amount of liquids you drink  · Train your bladder  Go to the bathroom at set times, such as every 2 hours, even if you do not feel the urge to go  You can also try to hold your urine when you feel the urge to go  For example, hold your urine for 5 minutes when you feel the urge to go  As that becomes easier, hold your urine for 10 minutes  Work up to every 3 or 4 hours to help control your bladder  · Limit liquids as directed  Limit liquids to decrease the amount you urinate  Ask how much liquid to drink each day and which liquids are best for you  You may need to avoid drinking liquids several hours before you go to sleep  Your healthcare provider may also recommend that you limit caffeine and alcohol  · Do Kegel exercises often  Kegel exercises help strengthen your pelvic muscles and improve bladder control  These muscles help you stop urinating  Squeeze these muscles tightly for 5 seconds like you are trying to stop the flow of urine  Then relax for 5 seconds  Gradually work up to squeezing for 10 seconds  Do 3 sets of 15 repetitions a day, or as directed  · Exercise regularly and maintain a healthy weight  Ask your healthcare provider how much you should weigh and about the best exercise plan for you  Extra weight puts pressure on your bladder and may make your symptoms worse  Ask your provider to help you create a safe weight loss plan if you are overweight  Follow up with your healthcare provider as directed: Your healthcare provider may refer you to a specialist to find the cause of your symptoms  Write down your questions so you remember to ask them during your visits  © [employer] 2022 Information is for End User's use only and may not be sold, redistributed or otherwise used for commercial purposes  All illustrations and images included in CareNotes® are the copyrighted property of [employer]  or [facility] [place]   The above information is an  only   It is not intended as medical advice for individual conditions or treatments  Talk to your doctor, nurse or pharmacist before following any medical regimen to see if it is safe and effective for you

## 2022-04-28 LAB — BACTERIA UR CULT: ABNORMAL

## 2022-05-18 ENCOUNTER — OFFICE VISIT (OUTPATIENT)
Dept: INTERNAL MEDICINE CLINIC | Facility: CLINIC | Age: 67
End: 2022-05-18
Payer: COMMERCIAL

## 2022-05-18 VITALS
SYSTOLIC BLOOD PRESSURE: 102 MMHG | DIASTOLIC BLOOD PRESSURE: 70 MMHG | TEMPERATURE: 98.6 F | WEIGHT: 164 LBS | HEART RATE: 72 BPM | OXYGEN SATURATION: 98 % | HEIGHT: 64 IN | BODY MASS INDEX: 28 KG/M2

## 2022-05-18 DIAGNOSIS — N95.2 ATROPHIC VAGINITIS: ICD-10-CM

## 2022-05-18 DIAGNOSIS — N39.0 URINARY TRACT INFECTION WITHOUT HEMATURIA, SITE UNSPECIFIED: Primary | ICD-10-CM

## 2022-05-18 PROCEDURE — 99213 OFFICE O/P EST LOW 20 MIN: CPT | Performed by: INTERNAL MEDICINE

## 2022-05-18 RX ORDER — CEPHALEXIN 500 MG/1
500 CAPSULE ORAL EVERY 8 HOURS SCHEDULED
Qty: 45 CAPSULE | Refills: 0 | Status: SHIPPED | OUTPATIENT
Start: 2022-05-18 | End: 2022-05-23

## 2022-05-18 RX ORDER — ESTRADIOL 0.1 MG/G
CREAM VAGINAL
Qty: 42.5 G | Refills: 3 | Status: SHIPPED | OUTPATIENT
Start: 2022-05-18

## 2022-05-18 NOTE — PROGRESS NOTES
Assessment/Plan:    Diagnoses and all orders for this visit:    Urinary tract infection without hematuria, site unspecified  Comments:  POC ua-positive for leukocytes and blood negative for nitrates  Urine culture in April 22-pansensitive E coli  Will give empirical treatment with cephalexin  Orders:  -     cephalexin (KEFLEX) 500 mg capsule; Take 1 capsule (500 mg total) by mouth every 8 (eight) hours for 5 days Take 1 capsule every day as Prophylaxis, after initial antibiotic course is completed    Atrophic vaginitis  -     estradiol (ESTRACE) 0 1 mg/g vaginal cream; Insert 1 g into vagina twice a week              There are no Patient Instructions on file for this visit  Subjective:      Patient ID: Darrian Crespo is a 78 y o  female    Complains of burning and frequency of urination  Has been having repeated symptoms, last urine culture in April 2022 positive for pansensitive E coli  Denies fever chills nausea vomiting diarrhea of flank pain    Denies history of kidney stones        Current Outpatient Medications:     bisacodyl (DULCOLAX) 5 mg EC tablet, Take 1 tablet (5 mg total) by mouth daily as needed for constipation, Disp: 2 tablet, Rfl: 0    Calcium Carbonate-Vitamin D (CALCIUM 500/D PO), Take by mouth, Disp: , Rfl:     cephalexin (KEFLEX) 500 mg capsule, Take 1 capsule (500 mg total) by mouth every 8 (eight) hours for 5 days Take 1 capsule every day as Prophylaxis, after initial antibiotic course is completed, Disp: 45 capsule, Rfl: 0    Cholecalciferol (VITAMIN D) 2000 units CAPS, Take by mouth, Disp: , Rfl:     clobetasol (TEMOVATE) 0 05 % ointment, Apply topically 2 (two) times a day, Disp: 30 g, Rfl: 0    Cranberry 1000 MG CAPS, Take by mouth, Disp: , Rfl:     estradiol (ESTRACE) 0 1 mg/g vaginal cream, Insert 1 g into vagina twice a week, Disp: 42 5 g, Rfl: 3    famotidine (PEPCID) 20 mg tablet, Take 1 tablet (20 mg total) by mouth 2 (two) times a day, Disp: 60 tablet, Rfl: 2   Multiple Vitamins-Minerals (MULTIVITAL-M PO), Take by mouth, Disp: , Rfl:     rosuvastatin (CRESTOR) 5 mg tablet, Take 1 tablet (5 mg total) by mouth daily, Disp: 90 tablet, Rfl: 0    sertraline (ZOLOFT) 50 mg tablet, TAKE ONE TABLET BY MOUTH EVERY DAY, Disp: 90 tablet, Rfl: 2    docusate sodium (COLACE) 100 mg capsule, Take 100 mg by mouth 2 (two) times a day , Disp: , Rfl:     linaCLOtide 290 MCG CAPS, Take 1 capsule by mouth daily, Disp: 30 capsule, Rfl: 7    nitrofurantoin (MACROBID) 100 mg capsule, Take 1 capsule (100 mg total) by mouth 2 (two) times a day, Disp: 10 capsule, Rfl: 0    omeprazole (PriLOSEC) 40 MG capsule, Take 1 capsule (40 mg total) by mouth daily, Disp: 90 capsule, Rfl: 0    polyethylene glycol (GOLYTELY) 4000 mL solution, Take 4,000 mL by mouth once for 1 dose, Disp: 4000 mL, Rfl: 0    Recent Results (from the past 1008 hour(s))   POCT urine dip    Collection Time: 04/24/22  4:35 PM   Result Value Ref Range    LEUKOCYTE ESTERASE,UA large     NITRITE,UA negative     SL AMB POCT UROBILINOGEN 0 2     POCT URINE PROTEIN negative      PH,UA 5 0     BLOOD,UA small     SPECIFIC GRAVITY,UA 1 000     KETONES,UA negative     BILIRUBIN,UA negative     GLUCOSE, UA negative      COLOR,UA yellow     CLARITY,UA cloudy    Urine culture    Collection Time: 04/24/22  5:19 PM    Specimen: Urine, Clean Catch   Result Value Ref Range    Urine Culture >100,000 cfu/ml Escherichia coli (A)        Susceptibility    Escherichia coli - EMERALD     ZID Performed Yes       Ampicillin ($$) <=8 00 Susceptible ug/ml     Aztreonam ($$$)  <=4 Susceptible ug/ml     Cefazolin ($) <=2 00 Susceptible ug/ml     Ciprofloxacin ($)  <=0 25 Susceptible ug/ml     Gentamicin ($$) <=2 Susceptible ug/ml     Levofloxacin ($) <=0 50 Susceptible ug/ml     Nitrofurantoin <=32 Susceptible ug/ml     Tetracycline <=4 Susceptible ug/ml     Tobramycin ($) <=2 Susceptible ug/ml     Trimethoprim + Sulfamethoxazole ($$$) <=0 5/9 5 Susceptible ug/ml       The following portions of the patient's history were reviewed and updated as appropriate: allergies, current medications, past family history, past medical history, past social history, past surgical history and problem list      Review of Systems   Constitutional: Negative for appetite change, chills, diaphoresis, fatigue, fever and unexpected weight change  Respiratory: Negative for apnea, cough, choking, chest tightness, shortness of breath, wheezing and stridor  Cardiovascular: Negative for chest pain, palpitations and leg swelling  Gastrointestinal: Negative for abdominal distention, abdominal pain, anal bleeding, blood in stool, constipation, diarrhea, nausea and vomiting  Genitourinary: Positive for dysuria and frequency  Negative for decreased urine volume, difficulty urinating, flank pain, hematuria, pelvic pain, urgency and vaginal discharge  Musculoskeletal: Negative for arthralgias, back pain and myalgias  Neurological: Negative for dizziness, light-headedness, numbness and headaches  Objective:      Vitals:    05/18/22 1320   BP: 102/70   Pulse: 72   Temp: 98 6 °F (37 °C)   SpO2: 98%          Physical Exam  Vitals reviewed  Constitutional:       General: She is not in acute distress  Appearance: Normal appearance  She is not ill-appearing, toxic-appearing or diaphoretic  HENT:      Mouth/Throat:      Mouth: Mucous membranes are moist    Cardiovascular:      Rate and Rhythm: Normal rate and regular rhythm  Pulses: Normal pulses  Heart sounds: Normal heart sounds  No murmur heard  No friction rub  No gallop  Pulmonary:      Effort: Pulmonary effort is normal  No respiratory distress  Breath sounds: Normal breath sounds  No stridor  No wheezing, rhonchi or rales  Chest:      Chest wall: No tenderness  Abdominal:      General: There is no distension  Palpations: Abdomen is soft  There is no mass  Tenderness:  There is no abdominal tenderness  There is no right CVA tenderness, left CVA tenderness or rebound  Musculoskeletal:         General: No swelling or tenderness  Right lower leg: No edema  Left lower leg: No edema  Skin:     General: Skin is warm and dry  Findings: No lesion or rash  Neurological:      Mental Status: She is alert and oriented to person, place, and time

## 2022-05-19 ENCOUNTER — SOCIAL WORK (OUTPATIENT)
Dept: BEHAVIORAL/MENTAL HEALTH CLINIC | Facility: CLINIC | Age: 67
End: 2022-05-19
Payer: COMMERCIAL

## 2022-05-19 DIAGNOSIS — F41.9 ANXIETY: ICD-10-CM

## 2022-05-19 PROCEDURE — 90834 PSYTX W PT 45 MINUTES: CPT | Performed by: SOCIAL WORKER

## 2022-05-19 NOTE — PSYCH
Psychotherapy Provided: Individual Psychotherapy 50 minutes      Length of time in session: 50 minutes, follow up in 2 week     Goals addressed in session: Goal 1     Pain:  none     0     Current suicide risk : Low      D: Cris Goel spoke today about her feelings re: her recent trip to 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     Anxiety

## 2022-06-20 ENCOUNTER — TELEPHONE (OUTPATIENT)
Dept: PSYCHIATRY | Facility: CLINIC | Age: 67
End: 2022-06-20

## 2022-06-20 NOTE — TELEPHONE ENCOUNTER
Pt called and stated she needed to cancel her appointment  If you can just call and see if she needs to reschedule or just wants to keep her July appointment as her next one  I didn't take the appointment out of the book

## 2022-06-28 ENCOUNTER — TELEPHONE (OUTPATIENT)
Dept: GASTROENTEROLOGY | Facility: CLINIC | Age: 67
End: 2022-06-28

## 2022-06-28 NOTE — TELEPHONE ENCOUNTER
Patients GI provider:  Dr Nicole [place]    Number to return call: ([phone])    Reason for call: Pt calling to reschedule her Friday colonoscopy  Please call to reschedule  She is still on for Friday but needs a different day  Thank you!     Scheduled procedure/appointment date if applicable: Apt/procedure 7/1/22

## 2022-06-29 NOTE — TELEPHONE ENCOUNTER
LMOM for pt to callback directly to r/s colon with Dr Edwin Hardy @ [address]    Friday's colon has been cancelled per pt request

## 2022-07-06 NOTE — TELEPHONE ENCOUNTER
VM from pt - needs a Monday appointment  TC to pt to r/s colon      Scheduled date of colonoscopy (as of today):  9/19/22  Physician performing colonoscopy:  Dr Viet Wright  Location of colonoscopy:  [facility]  Bowel prep reviewed with patient:  2-day Golytely  Instructions reviewed with patient by:  Melody Friend - mailed to pt's home  Clearances: n/a

## 2022-07-13 NOTE — TELEPHONE ENCOUNTER
VM from pt - need to r/s colon  States 10/17 or 10/24 ok  LMOM for pt to callback directly to r/s procedure     Offered 10/17 with Dr Lebron Palmer or Dr Venice Combs at [facility], or 10/24 with Dr Lebron Palmer at [facility]

## 2022-07-19 NOTE — TELEPHONE ENCOUNTER
TC to pt to reschedule colon      Scheduled date of colonoscopy (as of today):  10/17/22  Physician performing colonoscopy:  Dr Soheila Thomas  [facility] of colonoscopy:  [facility]  Bowel prep reviewed with patient:  2-day Johan  Instructions reviewed with patient by:  Mailed to pt's home  Clearances: n/a

## 2022-07-21 ENCOUNTER — SOCIAL WORK (OUTPATIENT)
Dept: BEHAVIORAL/MENTAL HEALTH CLINIC | Facility: CLINIC | Age: 67
End: 2022-07-21
Payer: COMMERCIAL

## 2022-07-21 DIAGNOSIS — F41.9 ANXIETY: ICD-10-CM

## 2022-07-21 PROCEDURE — 90834 PSYTX W PT 45 MINUTES: CPT | Performed by: SOCIAL WORKER

## 2022-07-21 NOTE — PSYCH
Psychotherapy Provided: Individual Psychotherapy 50 minutes      Length of time in session: 50 minutes, follow up in 2 week     Goals addressed in session: Goal 1     Pain:  none     0     Current suicide risk : Low      D: Anju Cortés spoke today about her feelings re: her [place] with [place]  and nieces during which her  was not very social   She also shared that she did not remove her cover up as she was uncomfortable with her weight  A: Anju Cortés continues to benefit from the support of therapy as it allows her to  Normalization, validation and self care  She continues to work out several days per week, but admits that she needs assistance with what / how she is eating    P:Sessions will continue to be used to provide support to [place] as she proceeds towards partial detention  She agreed to speak with her PCP on this topic      Lalo Cordero: Diagnosis and Treatment Plan explained to Phoebe Sandhu relates understanding diagnosis and is agreeable to Treatment Plan  Yes        This note was not shared with the patient due to this is a psychotherapy note     Encounter Diagnoses   Name Primary?     Anxiety

## 2022-07-26 ENCOUNTER — APPOINTMENT (OUTPATIENT)
Dept: LAB | Facility: HOSPITAL | Age: 67
End: 2022-07-26

## 2022-07-26 DIAGNOSIS — E78.00 PURE HYPERCHOLESTEROLEMIA: ICD-10-CM

## 2022-07-26 DIAGNOSIS — Z00.8 ENCOUNTER FOR OTHER GENERAL EXAMINATION: ICD-10-CM

## 2022-07-26 LAB
CHOLEST SERPL-MCNC: 250 MG/DL
EST. AVERAGE GLUCOSE BLD GHB EST-MCNC: 117 MG/DL
HBA1C MFR BLD: 5.7 %
HDLC SERPL-MCNC: 71 MG/DL
LDLC SERPL CALC-MCNC: 164 MG/DL (ref 0–100)
NONHDLC SERPL-MCNC: 179 MG/DL
TRIGL SERPL-MCNC: 76 MG/DL

## 2022-07-26 PROCEDURE — 83036 HEMOGLOBIN GLYCOSYLATED A1C: CPT

## 2022-07-26 PROCEDURE — 36415 COLL VENOUS BLD VENIPUNCTURE: CPT

## 2022-07-26 PROCEDURE — 80061 LIPID PANEL: CPT

## 2022-07-26 RX ORDER — ROSUVASTATIN CALCIUM 5 MG/1
5 TABLET, COATED ORAL DAILY
Qty: 90 TABLET | Refills: 0 | Status: SHIPPED | OUTPATIENT
Start: 2022-07-26

## 2022-07-26 NOTE — TELEPHONE ENCOUNTER
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

## 2022-08-03 ENCOUNTER — OFFICE VISIT (OUTPATIENT)
Dept: INTERNAL MEDICINE CLINIC | Facility: CLINIC | Age: 67
End: 2022-08-03
Payer: COMMERCIAL

## 2022-08-03 VITALS
SYSTOLIC BLOOD PRESSURE: 108 MMHG | DIASTOLIC BLOOD PRESSURE: 70 MMHG | WEIGHT: 172 LBS | OXYGEN SATURATION: 94 % | HEIGHT: 64 IN | TEMPERATURE: 97.5 F | BODY MASS INDEX: 29.37 KG/M2 | HEART RATE: 73 BPM

## 2022-08-03 DIAGNOSIS — E78.00 PURE HYPERCHOLESTEROLEMIA: ICD-10-CM

## 2022-08-03 DIAGNOSIS — R63.5 WEIGHT GAIN: Primary | ICD-10-CM

## 2022-08-03 DIAGNOSIS — K21.00 GASTROESOPHAGEAL REFLUX DISEASE WITH ESOPHAGITIS WITHOUT HEMORRHAGE: ICD-10-CM

## 2022-08-03 DIAGNOSIS — R73.01 IMPAIRED FASTING BLOOD SUGAR: ICD-10-CM

## 2022-08-03 PROCEDURE — 99214 OFFICE O/P EST MOD 30 MIN: CPT | Performed by: INTERNAL MEDICINE

## 2022-08-03 RX ORDER — FAMOTIDINE 20 MG/1
20 TABLET, FILM COATED ORAL 2 TIMES DAILY
Qty: 60 TABLET | Refills: 2 | Status: SHIPPED | OUTPATIENT
Start: 2022-08-03

## 2022-08-03 NOTE — PROGRESS NOTES
Assessment/Plan:           1  Weight gain  Comments:  Was them pick 0 25 mg weekly recommended  Low-calorie high-fiber diet discussed  Lifestyle modification recommended  Orders:  -     Semaglutide,0 25 or 0 5MG/DOS, 2 MG/1 5ML SOPN; Inject 0 25 mg under the skin once a week  -     TSH + Free T4; Future    2  Gastroesophageal reflux disease with esophagitis without hemorrhage  Comments:  Continue famotidine  Orders:  -     famotidine (PEPCID) 20 mg tablet; Take 1 tablet (20 mg total) by mouth 2 (two) times a day    3  Pure hypercholesterolemia  Comments:  Continue rosuvastatin 5 mg daily  4  Impaired fasting blood sugar  Comments:  A1c is 5 7 and this will be monitored  1  Gastroesophageal reflux disease with esophagitis without hemorrhage         No problem-specific Assessment & Plan notes found for this encounter  Subjective:      Patient ID: José Luis Borjas is a 78 y o  female  HPI    The following portions of the patient's history were reviewed and updated as appropriate: She  has a past medical history of Anxiety, Body mass index 27 0-27 9, adult, Colon cancer screening, Colon polyp, Depression, GERD (gastroesophageal reflux disease), GERD (gastroesophageal reflux disease), Hyperlipidemia, and Viral intestinal infection  She   Patient Active Problem List    Diagnosis Date Noted    Chronic idiopathic constipation 02/11/2022    History of colon polyps 02/11/2022    Screening for colon cancer 09/17/2021    Hiatal hernia 09/17/2021    Pure hypercholesterolemia 08/17/2021    GERD (gastroesophageal reflux disease)     Anxiety 98/12/5981    Lichen sclerosus 60/05/0240     She  has a past surgical history that includes Cholecystectomy (08/2011); Carpal tunnel release (Bilateral); Tubal ligation; Colonoscopy;  Cystoscopy; pr revaginal prolapse,sacrosp lig (N/A, 5/23/2016); pr cmbnd anterpost colporraphy w/cysto (N/A, 5/23/2016); pr sling oper stres incontinence (N/A, 5/23/2016); pr cystourethroscopy (N/A, 5/23/2016); pr colonoscopy flx dx w/collj spec when pfrmd (N/A, 10/17/2018); and Colonoscopy (N/A, 10/18/2018)  Her family history includes COPD in her mother; Coronary artery disease in her father; Endometrial cancer (age of onset: 76) in her mother; No Known Problems in her brother, daughter, maternal aunt, maternal aunt, maternal aunt, maternal aunt, maternal grandfather, maternal grandmother, paternal aunt, paternal grandfather, paternal grandmother, sister, and sister  She  reports that she has never smoked  She has never used smokeless tobacco  She reports current alcohol use of about 1 0 standard drink of alcohol per week  She reports that she does not use drugs  Current Outpatient Medications   Medication Sig Dispense Refill    bisacodyl (DULCOLAX) 5 mg EC tablet Take 1 tablet (5 mg total) by mouth daily as needed for constipation 2 tablet 0    Calcium Carbonate-Vitamin D (CALCIUM 500/D PO) Take by mouth      Cholecalciferol (VITAMIN D) 2000 units CAPS Take by mouth      clobetasol (TEMOVATE) 0 05 % ointment Apply topically 2 (two) times a day 30 g 0    Cranberry 1000 MG CAPS Take by mouth      docusate sodium (COLACE) 100 mg capsule Take 100 mg by mouth 2 (two) times a day        estradiol (ESTRACE) 0 1 mg/g vaginal cream Insert 1 g into vagina twice a week 42 5 g 3    famotidine (PEPCID) 20 mg tablet Take 1 tablet (20 mg total) by mouth 2 (two) times a day 60 tablet 2    Multiple Vitamins-Minerals (MULTIVITAL-M PO) Take by mouth      rosuvastatin (CRESTOR) 5 mg tablet Take 1 tablet (5 mg total) by mouth daily 90 tablet 0    Semaglutide,0 25 or 0 5MG/DOS, 2 MG/1 5ML SOPN Inject 0 25 mg under the skin once a week 1 5 mL 3    sertraline (ZOLOFT) 50 mg tablet TAKE ONE TABLET BY MOUTH EVERY DAY 90 tablet 2    linaCLOtide 290 MCG CAPS Take 1 capsule by mouth daily (Patient not taking: Reported on 8/3/2022) 30 capsule 7    nitrofurantoin (MACROBID) 100 mg capsule Take 1 capsule (100 mg total) by mouth 2 (two) times a day (Patient not taking: No sig reported) 10 capsule 0    omeprazole (PriLOSEC) 40 MG capsule Take 1 capsule (40 mg total) by mouth daily (Patient not taking: Reported on 8/3/2022) 90 capsule 0    polyethylene glycol (GOLYTELY) 4000 mL solution Take 4,000 mL by mouth once for 1 dose (Patient not taking: Reported on 8/3/2022) 4000 mL 0     No current facility-administered medications for this visit  Current Outpatient Medications on File Prior to Visit   Medication Sig    bisacodyl (DULCOLAX) 5 mg EC tablet Take 1 tablet (5 mg total) by mouth daily as needed for constipation    Calcium Carbonate-Vitamin D (CALCIUM 500/D PO) Take by mouth    Cholecalciferol (VITAMIN D) 2000 units CAPS Take by mouth    clobetasol (TEMOVATE) 0 05 % ointment Apply topically 2 (two) times a day    Cranberry 1000 MG CAPS Take by mouth    docusate sodium (COLACE) 100 mg capsule Take 100 mg by mouth 2 (two) times a day      estradiol (ESTRACE) 0 1 mg/g vaginal cream Insert 1 g into vagina twice a week    Multiple Vitamins-Minerals (MULTIVITAL-M PO) Take by mouth    rosuvastatin (CRESTOR) 5 mg tablet Take 1 tablet (5 mg total) by mouth daily    sertraline (ZOLOFT) 50 mg tablet TAKE ONE TABLET BY MOUTH EVERY DAY    [DISCONTINUED] famotidine (PEPCID) 20 mg tablet Take 1 tablet (20 mg total) by mouth 2 (two) times a day    linaCLOtide 290 MCG CAPS Take 1 capsule by mouth daily (Patient not taking: Reported on 8/3/2022)    nitrofurantoin (MACROBID) 100 mg capsule Take 1 capsule (100 mg total) by mouth 2 (two) times a day (Patient not taking: No sig reported)    omeprazole (PriLOSEC) 40 MG capsule Take 1 capsule (40 mg total) by mouth daily (Patient not taking: Reported on 8/3/2022)    polyethylene glycol (GOLYTELY) 4000 mL solution Take 4,000 mL by mouth once for 1 dose (Patient not taking: Reported on 8/3/2022)     No current facility-administered medications on file prior to visit      She is allergic to levaquin [levofloxacin] and quinolones       Review of Systems   Constitutional: Negative for appetite change, chills, fatigue and fever  HENT: Negative for sore throat and trouble swallowing  Eyes: Negative for redness  Respiratory: Negative for shortness of breath  Cardiovascular: Negative for chest pain and palpitations  Gastrointestinal: Negative for abdominal pain, constipation and diarrhea  Genitourinary: Negative for dysuria and hematuria  Musculoskeletal: Negative for back pain and neck pain  Skin: Negative for rash  Neurological: Negative for seizures, weakness and headaches  Hematological: Negative for adenopathy  Psychiatric/Behavioral: Negative for confusion  The patient is not nervous/anxious  Objective:      /70 (BP Location: Left arm, Patient Position: Sitting, Cuff Size: Large)   Pulse 73   Temp 97 5 °F (36 4 °C) (Temporal)   Ht 5' 4" (1 626 m)   Wt 78 kg (172 lb)   SpO2 94% Comment: RA  BMI 29 52 kg/m²     Results Reviewed     None          Recent Results (from the past 1344 hour(s))   Lipid panel    Collection Time: 07/26/22  9:54 AM   Result Value Ref Range    Cholesterol 250 (H) See Comment mg/dL    Triglycerides 76 See Comment mg/dL    HDL, Direct 71 >=50 mg/dL    LDL Calculated 164 (H) 0 - 100 mg/dL    Non-HDL-Chol (CHOL-HDL) 179 mg/dl   Hemoglobin A1C    Collection Time: 07/26/22  9:54 AM   Result Value Ref Range    Hemoglobin A1C 5 7 (H) Normal 3 8-5 6%; PreDiabetic 5 7-6 4%; Diabetic >=6 5%; Glycemic control for adults with diabetes <7 0% %     mg/dl        Physical Exam  Constitutional:       General: She is not in acute distress  Appearance: Normal appearance  HENT:      Head: Normocephalic and atraumatic  Nose: Nose normal       Mouth/Throat:      Mouth: Mucous membranes are moist    Eyes:      Extraocular Movements: Extraocular movements intact        Pupils: Pupils are equal, round, and reactive to light  Cardiovascular:      Rate and Rhythm: Normal rate and regular rhythm  Pulses: Normal pulses  Heart sounds: Normal heart sounds  No murmur heard  No friction rub  Pulmonary:      Effort: Pulmonary effort is normal  No respiratory distress  Breath sounds: Normal breath sounds  No wheezing  Abdominal:      General: Abdomen is flat  Bowel sounds are normal  There is no distension  Palpations: Abdomen is soft  There is no mass  Tenderness: There is no abdominal tenderness  There is no guarding  Musculoskeletal:         General: Normal range of motion  Neurological:      General: No focal deficit present  Mental Status: She is alert and oriented to person, place, and time  Mental status is at baseline  Cranial Nerves: No cranial nerve deficit     Psychiatric:         Mood and Affect: Mood normal          Behavior: Behavior normal

## 2022-08-04 ENCOUNTER — TELEPHONE (OUTPATIENT)
Dept: INTERNAL MEDICINE CLINIC | Facility: CLINIC | Age: 67
End: 2022-08-04

## 2022-08-16 ENCOUNTER — APPOINTMENT (OUTPATIENT)
Dept: LAB | Facility: HOSPITAL | Age: 67
End: 2022-08-16
Attending: INTERNAL MEDICINE
Payer: COMMERCIAL

## 2022-08-16 DIAGNOSIS — R63.5 WEIGHT GAIN: ICD-10-CM

## 2022-08-16 LAB
T4 FREE SERPL-MCNC: 0.81 NG/DL (ref 0.76–1.46)
TSH SERPL DL<=0.05 MIU/L-ACNC: 2.36 UIU/ML (ref 0.45–4.5)

## 2022-08-16 PROCEDURE — 84439 ASSAY OF FREE THYROXINE: CPT

## 2022-08-16 PROCEDURE — 84443 ASSAY THYROID STIM HORMONE: CPT

## 2022-08-16 PROCEDURE — 36415 COLL VENOUS BLD VENIPUNCTURE: CPT

## 2022-08-17 ENCOUNTER — TELEPHONE (OUTPATIENT)
Dept: INTERNAL MEDICINE CLINIC | Facility: CLINIC | Age: 67
End: 2022-08-17

## 2022-09-01 ENCOUNTER — SOCIAL WORK (OUTPATIENT)
Dept: BEHAVIORAL/MENTAL HEALTH CLINIC | Facility: CLINIC | Age: 67
End: 2022-09-01
Payer: COMMERCIAL

## 2022-09-01 DIAGNOSIS — F41.9 ANXIETY: ICD-10-CM

## 2022-09-01 PROCEDURE — 90834 PSYTX W PT 45 MINUTES: CPT | Performed by: SOCIAL WORKER

## 2022-09-06 NOTE — PSYCH
Psychotherapy Provided: Individual Psychotherapy 50 minutes      Length of time in session: 50 minutes, follow up in 2 week     Goals addressed in session: Goal 1     Pain:  none     0     Current suicide risk : Low      D: Xochitl Gutierrez spoke further today about her feelings re: her  trip to the beach with her  as he has invited his siblings to return to the beach to celebrate his birthday and "no one has responded "  Ways to address his siblings refusal to express their feelings directly to her  and ways to "moderate" were processed  A: Xochitl Gutierrez continues to benefit from the support of therapy as it allows her to  Normalization, validation and self care  She continues to work out several days per week, but admits that this does not seem to be "enough "     P:Sessions will continue to be used to provide support to Sim Camarillo as she proceeds towards partial correction    [facility] Luke: Diagnosis and Treatment Plan explained to Phoebe Sandhu relates understanding diagnosis and is agreeable to Treatment Plan  Yes        This note was not shared with the patient due to this is a psychotherapy note     Encounter Diagnoses   Name Primary?     Anxiety
Cephalic

## 2022-09-29 ENCOUNTER — SOCIAL WORK (OUTPATIENT)
Dept: BEHAVIORAL/MENTAL HEALTH CLINIC | Facility: CLINIC | Age: 67
End: 2022-09-29
Payer: COMMERCIAL

## 2022-09-29 DIAGNOSIS — F41.9 ANXIETY: ICD-10-CM

## 2022-09-29 PROCEDURE — 90834 PSYTX W PT 45 MINUTES: CPT | Performed by: SOCIAL WORKER

## 2022-09-29 NOTE — PSYCH
Psychotherapy Provided: Individual Psychotherapy 50 minutes      Length of time in session: 50 minutes, follow up in 2 week     Goals addressed in session: Goal 1     Pain:  none     0     Current suicide risk : Low      D: Xochitl Gutierrez spoke today about her feelings re: how much she continues to both enjoy and benefit from therapy as she shared a recent discussion with a colleague re: this topic  She expressed feelings re: her marriage, as well, as she shared ways that the relationship has "ebbed and flowed" over the years  A: Xochitl Gutierrez continues to worry about whether or not her thoughts, feelings and experiences are normal   Attending therapy assists her with this as she is able to vocalize her concerns and receive validation and support  P:Sessions will continue to be used for this purpose        [address] [place] [address]: Diagnosis and Treatment Plan explained to Phoebe Sandhu relates understanding diagnosis and is agreeable to Treatment Plan  Yes        This note was not shared with the patient due to this is a psychotherapy note     Encounter Diagnoses   Name Primary?     Anxiety

## 2022-10-12 PROBLEM — Z12.11 SCREENING FOR COLON CANCER: Status: RESOLVED | Noted: 2021-09-17 | Resolved: 2022-10-12

## 2022-10-18 ENCOUNTER — SOCIAL WORK (OUTPATIENT)
Dept: BEHAVIORAL/MENTAL HEALTH CLINIC | Facility: CLINIC | Age: 67
End: 2022-10-18
Payer: COMMERCIAL

## 2022-10-18 DIAGNOSIS — F41.9 ANXIETY: ICD-10-CM

## 2022-10-18 PROCEDURE — 90834 PSYTX W PT 45 MINUTES: CPT | Performed by: SOCIAL WORKER

## 2022-10-19 NOTE — PSYCH
Psychotherapy Provided: Individual Psychotherapy 50 minutes      Length of time in session: 50 minutes, follow up in 2 week    Time In: 5:00pm  Time Out: 5:50pm     Goals addressed in session: Goal 1     Pain:  none     0     Current suicide risk : Low      D: Steve Guillermo tearfully spoke today about her feelings re: her 's termination from employment after 37 years and the resulting events of the previous two weeks during which she has been informed by her friends and colleagues that they "can no longer be in contact with her" despite her own continued employment  A: Steve Guillermo requested additional appts for more support as a result of the above  P:Sessions will be used for the purpose of supporting her with the above at this time     Lalo Cordero: Diagnosis and Treatment Plan explained to Phoebe Sandhu relates understanding diagnosis and is agreeable to Treatment Plan  Yes        This note was not shared with the patient due to this is a psychotherapy note     Encounter Diagnoses   Name Primary?    • Anxiety done

## 2022-10-19 NOTE — PSYCH
Treatment Plan Tracking    # 1Treatment Plan not completed within required time limits due to: Christopher Matthews presented with emotional/behavioral issues that required clinical intervention

## 2022-11-02 ENCOUNTER — TELEPHONE (OUTPATIENT)
Dept: GASTROENTEROLOGY | Facility: CLINIC | Age: 67
End: 2022-11-02

## 2022-11-02 NOTE — TELEPHONE ENCOUNTER
VM from pt calling to r/s colon  TC to pt to reschedule colon        Scheduled date of colonoscopy (as of today):  12/7/22  Physician performing colonoscopy:  Dr Almeida Click  Location of colonoscopy:  [facility]  Bowel prep reviewed with patient:  2-day golytely  Instructions reviewed with patient by:  Mailed to pt's home  Clearances:  n/a

## 2022-11-10 ENCOUNTER — SOCIAL WORK (OUTPATIENT)
Dept: BEHAVIORAL/MENTAL HEALTH CLINIC | Facility: CLINIC | Age: 67
End: 2022-11-10

## 2022-11-10 DIAGNOSIS — F41.9 ANXIETY: Primary | ICD-10-CM

## 2022-11-10 NOTE — BH TREATMENT PLAN
Cathi Rodriguez  1955         Date of Initial Treatment Plan: 5/30/18   Date of Current Treatment Plan: 11/10/22     Treatment Plan Number 11  Strengths/Personal Resources for Self Care: I am always even keeled in stressful situations, optimistic, humorous, pretty loyal, dependable, a good teacher, a good mentor, a good resource, a good friend, champion of the underdog!     Diagnosis:   DAVID     Area of Needs: I have lost my trust in my workplace        Long Term Goal 1:  I want to heal from my past and present traumas        Target Date:  5/10/23  Cpompletion Date:  na    Short Term Objective for Goal !: A I will surround myself with people who care about, support, and appreciate me unconditionally    I will utilize therapy sessions to continue to receive validation         GOAL 1: Modality: Individual 1x per month   Completion Date na and The person(s) responsible for carrying out the plan is  Demi [address] [place] [address] Luke: Diagnosis and Treatment Plan explained to Phoebe Sandhu relates understanding diagnosis and is agreeable to Treatment Plan          Client Comments : Please share your thoughts, feelings, need and/or experiences regarding your treatment plan:     __________________________________________________________________    Treatment Plan done but not signed at time of office visit due to:  Plan reviewed by phone or in person  and verbal consent given

## 2022-11-10 NOTE — PSYCH
Psychotherapy Provided: Individual Psychotherapy 50 minutes     Length of time in session: 50 minutes, follow up in 2 week    Encounter Diagnosis     ICD-10-CM    1  Anxiety  F41 [id number]        Goals addressed in session: Goal 1     Pain:      none    0    Current suicide risk : Low     D: Julietta Harada tearfully spoke further today about her feelings re: her 's termination from employment after 37 years and the resulting events that have occurred within their family as a result of this  Claudia Myles also shared what it has been like to return to work on a daily basis to the same employer while managing her feelings of anger and hurt  A: Julietta Harada was able to acknowledge both her anger towards her employer as well as towards her   Ways to reach out to him with compassion and vulnerability were discussed  P:Sessions will be used for the purpose of supporting her with the above at this time  Behavioral Health Treatment Plan [facility]: Diagnosis and Treatment Plan explained to Otf Lyles relates understanding diagnosis and is agreeable to Treatment Plan   Yes     Visit start and stop times:    11/10/22  Start Time: 1600  Stop Time: 1650  Total Visit Time: 50 minutes

## 2022-11-10 NOTE — PSYCH
Treatment Plan Tracking    # 2Treatment Plan not completed within required time limits due to: Shelby Ortega presented with emotional/behavioral issues that required clinical intervention

## 2022-11-22 ENCOUNTER — SOCIAL WORK (OUTPATIENT)
Dept: BEHAVIORAL/MENTAL HEALTH CLINIC | Facility: CLINIC | Age: 67
End: 2022-11-22

## 2022-11-22 DIAGNOSIS — F41.9 ANXIETY: Primary | ICD-10-CM

## 2022-11-22 NOTE — PSYCH
Psychotherapy Provided: Individual Psychotherapy 50 minutes     Length of time in session: 50 minutes, follow up in 2 week    Encounter Diagnoses   Name Primary? • Anxiety Yes         Goals addressed in session: Goal 1     Pain:      none    0    Current suicide risk : Low     D: Danny Mcguire spoke further today about her feelings re: her 's termination from employment after 37 years and the resulting events that have occurred in their lives as a result of this  Yuvaltorie Foster also shared further what it has been like to return to work on a daily basis to the same employer while continuing to manage her feelings of anger and hurt  A: Danny Mcguire was able to acknowledge that she has been able to compartmentalize her anger until she is "face-to-face" with someone who was directly involved with his employment at which time it becomes more difficult and triggering to her   P:Sessions will be used for the purpose of supporting her with the above at this time  Behavioral Health Treatment Plan [facility]: Diagnosis and Treatment Plan explained to Vianey Lebron relates understanding diagnosis and is agreeable to Treatment Plan   Yes     Visit start and stop times:    11/22/22  Start Time: 1600  Stop Time: 1650  Total Visit Time: 50 minutes

## 2022-12-06 RX ORDER — SODIUM CHLORIDE 9 MG/ML
125 INJECTION, SOLUTION INTRAVENOUS CONTINUOUS
Status: CANCELLED | OUTPATIENT
Start: 2022-12-06

## 2022-12-07 ENCOUNTER — ANESTHESIA (OUTPATIENT)
Dept: GASTROENTEROLOGY | Facility: MEDICAL CENTER | Age: 67
End: 2022-12-07

## 2022-12-07 ENCOUNTER — ANESTHESIA EVENT (OUTPATIENT)
Dept: GASTROENTEROLOGY | Facility: MEDICAL CENTER | Age: 67
End: 2022-12-07

## 2022-12-07 ENCOUNTER — HOSPITAL ENCOUNTER (OUTPATIENT)
Dept: GASTROENTEROLOGY | Facility: MEDICAL CENTER | Age: 67
Setting detail: OUTPATIENT SURGERY
Discharge: HOME | End: 2022-12-07
Attending: INTERNAL MEDICINE

## 2022-12-07 VITALS
OXYGEN SATURATION: 97 % | DIASTOLIC BLOOD PRESSURE: 72 MMHG | RESPIRATION RATE: 16 BRPM | HEIGHT: 64 IN | SYSTOLIC BLOOD PRESSURE: 138 MMHG | BODY MASS INDEX: 28.17 KG/M2 | WEIGHT: 165 LBS | HEART RATE: 66 BPM | TEMPERATURE: 98 F

## 2022-12-07 DIAGNOSIS — Z86.010 PERSONAL HISTORY OF COLONIC POLYPS: ICD-10-CM

## 2022-12-07 DIAGNOSIS — K59.04 CHRONIC IDIOPATHIC CONSTIPATION: ICD-10-CM

## 2022-12-07 RX ORDER — PROPOFOL 10 MG/ML
INJECTION, EMULSION INTRAVENOUS CONTINUOUS PRN
Status: DISCONTINUED | OUTPATIENT
Start: 2022-12-07 | End: 2022-12-07

## 2022-12-07 RX ORDER — PROPOFOL 10 MG/ML
INJECTION, EMULSION INTRAVENOUS AS NEEDED
Status: DISCONTINUED | OUTPATIENT
Start: 2022-12-07 | End: 2022-12-07

## 2022-12-07 RX ORDER — LIDOCAINE HYDROCHLORIDE 20 MG/ML
INJECTION, SOLUTION EPIDURAL; INFILTRATION; INTRACAUDAL; PERINEURAL AS NEEDED
Status: DISCONTINUED | OUTPATIENT
Start: 2022-12-07 | End: 2022-12-07

## 2022-12-07 RX ORDER — SODIUM CHLORIDE 9 MG/ML
125 INJECTION, SOLUTION INTRAVENOUS CONTINUOUS
Status: DISCONTINUED | OUTPATIENT
Start: 2022-12-07 | End: 2022-12-11 | Stop reason: HOSPADM

## 2022-12-07 RX ADMIN — LIDOCAINE HYDROCHLORIDE 50 MG: 20 INJECTION, SOLUTION EPIDURAL; INFILTRATION; INTRACAUDAL; PERINEURAL at 09:53

## 2022-12-07 RX ADMIN — SODIUM CHLORIDE 125 ML/HR: 0.9 INJECTION, SOLUTION INTRAVENOUS at 09:41

## 2022-12-07 RX ADMIN — PROPOFOL 50 MG: 10 INJECTION, EMULSION INTRAVENOUS at 09:58

## 2022-12-07 RX ADMIN — PROPOFOL 120 MCG/KG/MIN: 10 INJECTION, EMULSION INTRAVENOUS at 09:58

## 2022-12-07 RX ADMIN — PROPOFOL 30 MG: 10 INJECTION, EMULSION INTRAVENOUS at 09:55

## 2022-12-07 RX ADMIN — PROPOFOL 120 MG: 10 INJECTION, EMULSION INTRAVENOUS at 09:53

## 2022-12-07 NOTE — H&P
History and Physical - [facility]  Shanti Llamas 78 y o  female MRN: [id number]                  HPI: Shanti Llamas is a 78y o  year old female who presents for colon cancer screening  2 previous colonoscopy attempts with poor prep  Colonoscopy in 2018 with SSA in cecum  REVIEW OF SYSTEMS: Per the HPI, and otherwise unremarkable  Historical Information   Past Medical History:   Diagnosis Date   • Anxiety    • Body mass index 27 0-27 9, adult    • Colon cancer screening    • Colon polyp    • Depression    • GERD (gastroesophageal reflux disease)     takes tums occasionaly   • GERD (gastroesophageal reflux disease)    • Hyperlipidemia    • Viral intestinal infection      Past Surgical History:   Procedure Laterality Date   • CARPAL TUNNEL RELEASE Bilateral    • CHOLECYSTECTOMY  08/2011   • COLONOSCOPY     • COLONOSCOPY N/A 10/18/2018    Procedure: COLONOSCOPY;  Surgeon: Lulu Turk MD;  Location: [facility]; Service: Gastroenterology   • CYSTOSCOPY     • WA CMBND ANTERPOST COLPORRAPHY W/CYSTO N/A 5/23/2016    Procedure: COLPORRHAPHY ANTERIOR POSTERIOR WITH GRAFT; ENTEROCELE REPAIR;  Surgeon: Rabia Manzanares MD;  [facility];  Service: UroGynecology          • WA COLONOSCOPY FLX DX W/COLLJ SPEC WHEN PFRMD N/A 10/17/2018    Procedure: COLONOSCOPY;  Surgeon: Lulu Turk MD;  [facility]; Service: Gastroenterology   • WA CYSTOURETHROSCOPY N/A 5/23/2016    Procedure: CYSTOSCOPY;  Surgeon: Rabia Manzanares MD;  [facility];  Service: UroGynecology          • WA REVAGINAL PROLAPSE,SACROSP LIG N/A 5/23/2016    Procedure: COLPOPEXY VAGINAL EXTRAPERITONEAL (VEC);   Surgeon: Rabia Manzanares MD;  [facility];  Service: UroGynecology          • WA SLING OPER STRES INCONTINENCE N/A 5/23/2016    Procedure: INSERTION PUBOVAGINAL SLING ;  Surgeon: Rabia Manzanares MD;  [facility];  Service: UroGynecology          • TUBAL LIGATION       Social History   Social History     Substance and Sexual Activity   Alcohol Use Yes   • Alcohol/week: 1 0 standard drink   • Types: 1 Cans of beer per week    Comment: weekly     Social History     Substance and Sexual Activity   Drug Use No     Social History     Tobacco Use   Smoking Status Never   Smokeless Tobacco Never     Family History   Problem Relation Age of Onset   • Endometrial cancer Mother 76   • COPD Mother    • Coronary artery disease Father    • No Known Problems Sister    • No Known Problems Daughter    • No Known Problems Maternal Grandmother    • No Known Problems Maternal Grandfather    • No Known Problems Paternal Grandmother    • No Known Problems Paternal Grandfather    • No Known Problems Sister    • No Known Problems Maternal Aunt    • No Known Problems Maternal Aunt    • No Known Problems Maternal Aunt    • No Known Problems Maternal Aunt    • No Known Problems Paternal Aunt    • No Known Problems Brother        Meds/Allergies       Current Outpatient Medications:   •  Calcium Carbonate-Vitamin D (CALCIUM 500/D PO)  •  Cholecalciferol (VITAMIN D) 2000 units CAPS  •  docusate sodium (COLACE) 100 mg capsule  •  famotidine (PEPCID) 20 mg tablet  •  Multiple Vitamins-Minerals (MULTIVITAL-M PO)  •  sertraline (ZOLOFT) 50 mg tablet  •  bisacodyl (DULCOLAX) 5 mg EC tablet  •  clobetasol (TEMOVATE) 0 05 % ointment  •  Cranberry 1000 MG CAPS  •  estradiol (ESTRACE) 0 1 mg/g vaginal cream  •  linaCLOtide 290 MCG CAPS  •  nitrofurantoin (MACROBID) 100 mg capsule  •  omeprazole (PriLOSEC) 40 MG capsule  •  polyethylene glycol (GOLYTELY) 4000 mL solution  •  rosuvastatin (CRESTOR) 5 mg tablet    Current Facility-Administered Medications:   •  sodium chloride 0 9 % infusion, 125 mL/hr, Intravenous, Continuous    Allergies   Allergen Reactions   • Levaquin [Levofloxacin] Swelling and Other (See Comments)     Fluid in knee   • Quinolones Other (See Comments)     And levaquin       Objective     /67   Pulse 76   Temp 98 °F (36 7 °C) (Temporal)   Resp 16   SpO2 97%       PHYSICAL EXAM    Gen: NAD  Head: NCAT  CV: RRR  CHEST: Clear  ABD: soft, NT/ND  EXT: no edema      ASSESSMENT/PLAN:  This is a 78y o  year old female here for colonoscopy, and she is stable and optimized for her procedure

## 2022-12-07 NOTE — ANESTHESIA POSTPROCEDURE EVALUATION
Post-Op Assessment Note    CV Status:  Stable    Pain management: adequate     Mental Status:  Alert and awake   Hydration Status:  Euvolemic   PONV Controlled:  Controlled   Airway Patency:  Patent      Post Op Vitals Reviewed: Yes      Staff: Anesthesiologist         No notable events documented      BP      Temp      Pulse     Resp      SpO2      /72   Pulse 66   Temp 98 °F (36 7 °C) (Temporal)   Resp 16   Ht 5' 4" (1 626 m)   Wt 74 8 kg (165 lb)   SpO2 97%   BMI 28 32 kg/m²

## 2022-12-07 NOTE — ANESTHESIA PREPROCEDURE EVALUATION
Procedure:  COLONOSCOPY    Relevant Problems   CARDIO   (+) Pure hypercholesterolemia      GI/HEPATIC   (+) GERD (gastroesophageal reflux disease)   (+) Hiatal hernia      NEURO/PSYCH   (+) Anxiety   (+) History of colon polyps        Physical Exam    Airway    Mallampati score: II  TM Distance: >3 FB  Neck ROM: full     Dental   No notable dental hx     Cardiovascular  Rhythm: regular, Rate: normal, Cardiovascular exam normal    Pulmonary  Pulmonary exam normal     Other Findings        Anesthesia Plan  ASA Score- 2     Anesthesia Type- IV sedation with anesthesia with ASA Monitors  Additional Monitors:   Airway Plan:           Plan Factors-Exercise tolerance (METS): >4 METS  Chart reviewed  Patient summary reviewed  Patient is not a current smoker  Patient instructed to abstain from smoking on day of procedure  Patient did not smoke on day of surgery  Induction- intravenous  Postoperative Plan-     Informed Consent- Anesthetic plan and risks discussed with patient

## 2022-12-08 ENCOUNTER — SOCIAL WORK (OUTPATIENT)
Dept: BEHAVIORAL/MENTAL HEALTH CLINIC | Facility: CLINIC | Age: 67
End: 2022-12-08

## 2022-12-08 DIAGNOSIS — F41.9 ANXIETY: Primary | ICD-10-CM

## 2022-12-08 NOTE — PSYCH
Psychotherapy Provided: Individual Psychotherapy 50 minutes     Length of time in session: 50 minutes, follow up in 2 week    Encounter Diagnoses   Name Primary? • Anxiety Yes           Goals addressed in session: Goal 1     Pain:      none    0    Current suicide risk : Low     D: Jeb Jay spoke today about her feelings re: having been invited by her  to attend one of his therapy sessions with him  She  shared what it was like to do as well as some of the insights she gained about his anger towards his father  A: Jeb Jay was able to acknowledge that she is not ready to answer / respond to her best friend's calls, texts as a result of their last interaction  She does appear to be considering what / how she will express her feelings when they speak in the future as she is "moving forward" and does not want to "stay stuck "      P:Sessions will be used for the purpose of supporting her with the above at this time  Behavioral Health Treatment Plan [facility]: Diagnosis and Treatment Plan explained to Jasiel Bailey relates understanding diagnosis and is agreeable to Treatment Plan   Yes     Visit start and stop times:    12/08/22  Start Time: 1600  Stop Time: 1650  Total Visit Time: 50 minutes

## 2022-12-12 NOTE — RESULT ENCOUNTER NOTE
Dr Lexx Hobson MD,  Ms  Chrystal Hamm had two hyperplastic polyps removed during her colonoscopy  Usually I would give 10 years before her next colon, however her prep wasn't the best and I had to irrigate and suction quite a bit  I am recommending one in 5 years (given her age) and if that's normal, she can stop screening  Results were normal and she has been notified via [address]

## 2022-12-25 ENCOUNTER — HOSPITAL ENCOUNTER (EMERGENCY)
Facility: HOSPITAL | Age: 67
Discharge: HOME | End: 2022-12-25
Attending: EMERGENCY MEDICINE

## 2022-12-25 ENCOUNTER — APPOINTMENT (EMERGENCY)
Dept: RADIOLOGY | Facility: HOSPITAL | Age: 67
End: 2022-12-25

## 2022-12-25 VITALS
TEMPERATURE: 98 F | SYSTOLIC BLOOD PRESSURE: 152 MMHG | HEART RATE: 77 BPM | DIASTOLIC BLOOD PRESSURE: 83 MMHG | RESPIRATION RATE: 18 BRPM | OXYGEN SATURATION: 96 %

## 2022-12-25 DIAGNOSIS — R51.9 FACIAL PAIN: Primary | ICD-10-CM

## 2022-12-25 DIAGNOSIS — S09.93XA FACIAL TRAUMA, INITIAL ENCOUNTER: ICD-10-CM

## 2022-12-25 RX ORDER — ACETAMINOPHEN 325 MG/1
975 TABLET ORAL ONCE
Status: COMPLETED | OUTPATIENT
Start: 2022-12-25 | End: 2022-12-25

## 2022-12-25 RX ORDER — IBUPROFEN 400 MG/1
400 TABLET ORAL ONCE
Status: COMPLETED | OUTPATIENT
Start: 2022-12-25 | End: 2022-12-25

## 2022-12-25 RX ADMIN — ACETAMINOPHEN 975 MG: 325 TABLET, FILM COATED ORAL at 08:36

## 2022-12-25 RX ADMIN — IBUPROFEN 400 MG: 400 TABLET, FILM COATED ORAL at 08:36

## 2022-12-25 NOTE — DISCHARGE INSTRUCTIONS
Follow-up with PCP and occupational health for further care  Contact info provided below if needed  Use over the counter medications as stated on the bottle as needed for pain control   - Tylenol  - Ibuprofen  Return to the ED with new or worsening symptoms
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
## 2022-12-25 NOTE — Clinical Note
Johnny Martinezers was seen and treated in our emergency department on 12/25/2022  Diagnosis:     Freeman Foreman  is off the rest of the shift today  She may return on this date: If you have any questions or concerns, please don't hesitate to call        Dharmesh Renner MD    ______________________________           _______________          _______________  Hospital Representative                              Date                                Time

## 2022-12-27 ENCOUNTER — OCCMED (OUTPATIENT)
Dept: URGENT CARE | Age: 67
End: 2022-12-27

## 2022-12-27 ENCOUNTER — APPOINTMENT (OUTPATIENT)
Dept: RADIOLOGY | Age: 67
End: 2022-12-27

## 2022-12-27 DIAGNOSIS — S69.92XA HAND INJURY, LEFT, INITIAL ENCOUNTER: ICD-10-CM

## 2022-12-27 DIAGNOSIS — S60.012A CONTUSION OF LEFT THUMB WITHOUT DAMAGE TO NAIL, INITIAL ENCOUNTER: Primary | ICD-10-CM

## 2023-01-02 ENCOUNTER — APPOINTMENT (OUTPATIENT)
Dept: URGENT CARE | Age: 68
End: 2023-01-02
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
## 2023-01-10 ENCOUNTER — SOCIAL WORK (OUTPATIENT)
Dept: BEHAVIORAL/MENTAL HEALTH CLINIC | Facility: CLINIC | Age: 68
End: 2023-01-10

## 2023-01-10 DIAGNOSIS — F41.9 ANXIETY: Primary | ICD-10-CM

## 2023-01-11 NOTE — PSYCH
Psychotherapy Provided: Individual Psychotherapy 50 minutes     Length of time in session: 50 minutes, follow up in 2 week          Goals addressed in session: Goal 1     Pain:      none    0    Current suicide risk : Low     D: Marielle Hernandez spoke today about her feelings re: the role of "Peacemaker" that she has enacted throughout her life and continues to engage in between her daughter and her   This was explored at length today  A:  Gustavo Brown was able to recognize that she is unable to control either "side" despite her desire to do so  Reasons for her frustration and pain were validated  P:Sessions will be used for the purpose of supporting her with the above at this time  Behavioral Health Treatment Plan [facility]: Diagnosis and Treatment Plan explained to Rocky [place] relates understanding diagnosis and is agreeable to Treatment Plan   Yes     Visit start and stop times:    01/11/23  Start Time: 1600  Stop Time: 1650  Total Visit Time: 50 minutes

## 2023-02-07 ENCOUNTER — SOCIAL WORK (OUTPATIENT)
Dept: BEHAVIORAL/MENTAL HEALTH CLINIC | Facility: CLINIC | Age: 68
End: 2023-02-07

## 2023-02-07 DIAGNOSIS — F41.9 ANXIETY: Primary | ICD-10-CM

## 2023-02-08 NOTE — PSYCH
Behavioral Health Psychotherapy Progress Note    Psychotherapy Provided: Individual Psychotherapy     Encounter Diagnoses   Name Primary? • Anxiety Yes         Goals addressed in session: Goal 1     DATA: Mable Harm today about her feelings re: how well her  is adjusting to his new position  She also discussed recent contact with a close friend to resolve previous conflicts  During this session, this clinician used the following therapeutic modalities: Supportive Psychotherapy    Substance Abuse was not addressed during this session  If the client is diagnosed with a co-occurring substance use disorder, please indicate any changes in the frequency or amount of use:   Stage of change for addressing substance use diagnoses: No substance use/Not applicable    ASSESSMENT:  Tere Schultz presents with a Euthymic/ normal mood  She appears to feel more comfortable with the changes that have occurred in the past 6 months  She has been consistently working out and allowing others to take care of themselves  her affect is Normal range and intensity, which is congruent, with her mood and the content of the session  The client has made progress on their goals  Tere Schultz presents with a minimal risk of suicide, minimal risk of self-harm, and none risk of harm to others  For any risk assessment that surpasses a "low" rating, a safety plan must be developed  A safety plan was indicated: no  If yes, describe in detail     PLAN: Between sessions, Tere Schultz will continue to engage in acts of self care  At the next session, the therapist will use Supportive Psychotherapy to address the above in further detail  Behavioral Health Treatment Plan and Discharge Planning: Tere Schultz is aware of and agrees to continue to work on their treatment plan  They have identified and are working toward their discharge goals   yes    Visit start and stop times:    02/08/23  Start Time: 1600  Stop Time: 0589  Total Visit Time: 50 minutes

## 2023-02-17 RX ORDER — POLYETHYLENE GLYCOL 3350, SODIUM SULFATE ANHYDROUS, SODIUM BICARBONATE, SODIUM CHLORIDE, POTASSIUM CHLORIDE 236; 22.74; 6.74; 5.86; 2.97 G/4L; G/4L; G/4L; G/4L; G/4L
POWDER, FOR SOLUTION ORAL
COMMUNITY
Start: 2022-12-02

## 2023-03-02 ENCOUNTER — TELEPHONE (OUTPATIENT)
Dept: CARDIOLOGY CLINIC | Facility: MEDICAL CENTER | Age: 68
End: 2023-03-02

## 2023-03-07 ENCOUNTER — ANNUAL EXAM (OUTPATIENT)
Dept: OBGYN CLINIC | Facility: CLINIC | Age: 68
End: 2023-03-07
Payer: COMMERCIAL

## 2023-03-07 VITALS
WEIGHT: 167 LBS | BODY MASS INDEX: 28.51 KG/M2 | DIASTOLIC BLOOD PRESSURE: 72 MMHG | SYSTOLIC BLOOD PRESSURE: 116 MMHG | HEIGHT: 64 IN

## 2023-03-07 DIAGNOSIS — Z12.31 ENCOUNTER FOR SCREENING MAMMOGRAM FOR MALIGNANT NEOPLASM OF BREAST: ICD-10-CM

## 2023-03-07 DIAGNOSIS — Z13.820 OSTEOPOROSIS SCREENING: ICD-10-CM

## 2023-03-07 DIAGNOSIS — Z01.419 ROUTINE GYNECOLOGICAL EXAMINATION: Primary | ICD-10-CM

## 2023-03-07 DIAGNOSIS — L90.0 LICHEN SCLEROSUS: ICD-10-CM

## 2023-03-07 DIAGNOSIS — N95.1 POSTMENOPAUSAL DISORDER: ICD-10-CM

## 2023-03-07 DIAGNOSIS — F32.A DEPRESSION, UNSPECIFIED DEPRESSION TYPE: ICD-10-CM

## 2023-03-07 PROCEDURE — S0612 ANNUAL GYNECOLOGICAL EXAMINA: HCPCS | Performed by: OBSTETRICS & GYNECOLOGY

## 2023-03-07 RX ORDER — CLOBETASOL PROPIONATE 0.5 MG/G
OINTMENT TOPICAL 2 TIMES DAILY
Qty: 60 G | Refills: 1 | Status: SHIPPED | OUTPATIENT
Start: 2023-03-07 | End: 2023-04-25 | Stop reason: ALTCHOICE

## 2023-03-07 NOTE — PROGRESS NOTES
Shantelle Palomares   1955    CC:  Yearly exam    S:  80 y o  female here for yearly exam  She is postmenopausal and has had no vaginal bleeding     She denies vaginal discharge, itching, odor or dryness  Sexual activity: She is sexually active without pain, bleeding  Using estrace cream       Last Pap: 6/24/20 - NILM, Neg HPV  Last Mammo: 1/11/22 - BIRad 1  Last Colonoscopy: 12/7/22 - 5yr recall   Last DEXA: 11/3/18 - osteopenia     We reviewed ASC guidelines for Pap testing       Family hx of breast cancer: no  Family hx of ovarian cancer: no  Family hx of colon cancer: no      Current Outpatient Medications:   •  bisacodyl (DULCOLAX) 5 mg EC tablet, Take 1 tablet (5 mg total) by mouth daily as needed for constipation, Disp: 2 tablet, Rfl: 0  •  Calcium Carbonate-Vitamin D (CALCIUM 500/D PO), Take by mouth, Disp: , Rfl:   •  Cholecalciferol (VITAMIN D) 2000 units CAPS, Take by mouth, Disp: , Rfl:   •  clobetasol (TEMOVATE) 0 05 % ointment, Apply topically 2 (two) times a day, Disp: 30 g, Rfl: 0  •  Cranberry 1000 MG CAPS, Take by mouth, Disp: , Rfl:   •  docusate sodium (COLACE) 100 mg capsule, Take 100 mg by mouth 2 (two) times a day , Disp: , Rfl:   •  estradiol (ESTRACE) 0 1 mg/g vaginal cream, Insert 1 g into vagina twice a week, Disp: 42 5 g, Rfl: 3  •  famotidine (PEPCID) 20 mg tablet, Take 1 tablet (20 mg total) by mouth 2 (two) times a day, Disp: 60 tablet, Rfl: 2  •  Multiple Vitamins-Minerals (MULTIVITAL-M PO), Take by mouth, Disp: , Rfl:   •  rosuvastatin (CRESTOR) 5 mg tablet, Take 1 tablet (5 mg total) by mouth daily, Disp: 90 tablet, Rfl: 0  •  sertraline (ZOLOFT) 50 mg tablet, TAKE ONE TABLET BY MOUTH EVERY DAY, Disp: 90 tablet, Rfl: 2  Patient Active Problem List   Diagnosis   • Anxiety   • Lichen sclerosus   • GERD (gastroesophageal reflux disease)   • Pure hypercholesterolemia   • Hiatal hernia   • Chronic idiopathic constipation   • History of colon polyps     Family History   Problem "Relation Age of Onset   • Endometrial cancer Mother 76   • COPD Mother    • Coronary artery disease Father    • No Known Problems Sister    • No Known Problems Daughter    • No Known Problems Maternal Grandmother    • No Known Problems Maternal Grandfather    • No Known Problems Paternal Grandmother    • No Known Problems Paternal Grandfather    • No Known Problems Sister    • No Known Problems Maternal Aunt    • No Known Problems Maternal Aunt    • No Known Problems Maternal Aunt    • No Known Problems Maternal Aunt    • No Known Problems Paternal Aunt    • No Known Problems Brother      Past Medical History:   Diagnosis Date   • Anxiety    • Body mass index 27 0-27 9, adult    • Colon cancer screening    • Colon polyp    • Depression    • GERD (gastroesophageal reflux disease)     takes tums occasionaly   • GERD (gastroesophageal reflux disease)    • Hyperlipidemia    • Viral intestinal infection         Review of Systems   Respiratory: Negative  Cardiovascular: Negative  Gastrointestinal: Negative for constipation and diarrhea  Genitourinary: Negative for difficulty urinating, pelvic pain, vaginal bleeding, vaginal discharge, itching or odor  O:  Blood pressure 116/72, height 5' 3 78\" (1 62 m), weight 75 8 kg (167 lb), not currently breastfeeding  Patient appears well and is not in distress  Breasts are symmetrical without mass, tenderness, nipple discharge, skin changes or adenopathy  Abdomen is soft and nontender without masses  External genitals - changes c/w lichen sclerosis  Urethral meatus and urethra are normal  Bladder is normal to palpation  Vagina is normal without discharge or bleeding, generalized atrophic changes present   Cervix is normal without discharge or lesion  Uterus is normal, mobile, nontender without palpable mass  Adnexa are normal, nontender, without palpable mass       A:  Yearly exam      P:   Pap & HPV up to date  Mammo ordered   Colon Cancer Screening up to " date   DEXA ordered     Zoloft and clobetasol refilled  Reviewed considerations of menopause, to call with any postmenopausal bleeding or other concerns  RTO one year for yearly exam or sooner as needed

## 2023-03-14 ENCOUNTER — SOCIAL WORK (OUTPATIENT)
Dept: BEHAVIORAL/MENTAL HEALTH CLINIC | Facility: CLINIC | Age: 68
End: 2023-03-14

## 2023-03-14 DIAGNOSIS — F41.9 ANXIETY: Primary | ICD-10-CM

## 2023-03-14 NOTE — PSYCH
Behavioral Health Psychotherapy Progress Note    Psychotherapy Provided: Individual Psychotherapy     Encounter Diagnoses   Name Primary? • Anxiety Yes         Goals addressed in session: Goal 1     DATA: Howard Dates today about her feelings re: how her life has "calmed" as her  is adjusting to his new position  She also discussed ways that her 's family has become more "removed" from she and her 's lives  During this session, this clinician used the following therapeutic modalities: Supportive Psychotherapy    Substance Abuse was not addressed during this session  If the client is diagnosed with a co-occurring substance use disorder, please indicate any changes in the frequency or amount of use:   Stage of change for addressing substance use diagnoses: No substance use/Not applicable    ASSESSMENT:  Duane Galarza presents with a Euthymic/ normal mood  She continues to report feeling more comfortable with the changes that have occurred in the past 6 months  She has been consistently working out and allowing others to take care of themselves  her affect is Normal range and intensity, which is congruent, with her mood and the content of the session  The client has made progress on their goals  Duane Galarza presents with a minimal risk of suicide, minimal risk of self-harm, and none risk of harm to others  For any risk assessment that surpasses a "low" rating, a safety plan must be developed  A safety plan was indicated: no  If yes, describe in detail     PLAN: Between sessions, Duane Galarza will continue to engage in acts of self care  At the next session, the therapist will use Supportive Psychotherapy to address the above in further detail  Behavioral Health Treatment Plan and Discharge Planning: Duane Galarza is aware of and agrees to continue to work on their treatment plan  They have identified and are working toward their discharge goals   yes    Visit start and stop times:    03/15/23  Start Time: 1600  Stop Time: 1650  Total Visit Time: 50 minutes

## 2023-03-31 ENCOUNTER — TRANSCRIBE ORDERS (OUTPATIENT)
Dept: LAB | Facility: CLINIC | Age: 68
End: 2023-03-31

## 2023-03-31 ENCOUNTER — APPOINTMENT (OUTPATIENT)
Dept: LAB | Facility: CLINIC | Age: 68
End: 2023-03-31

## 2023-03-31 DIAGNOSIS — Z00.8 HEALTH EXAMINATION IN POPULATION SURVEY: Primary | ICD-10-CM

## 2023-03-31 DIAGNOSIS — Z00.8 HEALTH EXAMINATION IN POPULATION SURVEY: ICD-10-CM

## 2023-03-31 LAB
CHOLEST SERPL-MCNC: 213 MG/DL
HDLC SERPL-MCNC: 74 MG/DL
LDLC SERPL CALC-MCNC: 129 MG/DL (ref 0–100)
NONHDLC SERPL-MCNC: 139 MG/DL
TRIGL SERPL-MCNC: 48 MG/DL

## 2023-04-02 LAB
EST. AVERAGE GLUCOSE BLD GHB EST-MCNC: 114 MG/DL
HBA1C MFR BLD: 5.6 %

## 2023-04-04 ENCOUNTER — SOCIAL WORK (OUTPATIENT)
Dept: BEHAVIORAL/MENTAL HEALTH CLINIC | Facility: CLINIC | Age: 68
End: 2023-04-04

## 2023-04-04 DIAGNOSIS — F41.9 ANXIETY: Primary | ICD-10-CM

## 2023-04-05 NOTE — BH CRISIS PLAN
Client Name: Tan Dumont       Client YOB: 1955  : 1955    Treatment Team (include name and contact information):     [address] [facility] Provider  Jil Medina MD  [address]  [facility]  [phone]    Type of Plan   * Child plans (children 15 yo and younger) must be completed and signed by the child's legal guardian   * Plans for all individuals 15 yo and above must be signed by the client  Plan Type: adolescent/adult (14 and over) Initial      My Personal Strengths are (in the client's own words): My profession, I am so proud of how my kids navigate this world  The stressors and triggers that may put me at risk are:  other (describe) my 's struggles    Coping skills I can use to keep myself calm and safe: Other (describe) Exercise, music, time with friends    Coping skills/supports I can use to maintain abstinence from substance use: The people that provide me with help and support: (Include name, contact, and how they can help)   Support person #1: Iftikhar Wren    * Phone number:     * How can they help me? She listens without judgement   Support person #2:Celina    * Phone number:     * How can they help me? She is honest with me and is very loving  In the past, the following has helped me in times of crisis:    Calling a friend and Other: Doing things for others      If it is an emergency and you need immediate help, call     If there is a possibility of danger to yourself or others, call the following crisis hotline resources:     Adult Crisis Numbers  Suicide Prevention Hotline - Dial   [facility]: [address]: R Ana Lilia 57: [address]: [phone]  [address] [address] ([address]): [address] [facility] [address]: [id number] [place] [address] Avenue: [address] [place] [address] St: [phone] (daytime)         [phone] (after hours, weekends, holidays)     Child/Adolescent Crisis Numbers   [facility]: [address]: [phone]   Jose Juan Nicayana: [phone]   [facility]: [phone]    Please note: Some [facility] do not have a separate number for Child/Adolescent specific crisis  If your county is not listed under Child/Adolescent, please call the adult number for your county     National Talk to Text Line   [employer] - [phone]    In the event your feelings become unmanageable, and you cannot reach your support system, you will call 911 immediately or go to the nearest hospital emergency room

## 2023-04-05 NOTE — PSYCH
"Behavioral Health Psychotherapy Progress Note    Psychotherapy Provided: Individual Psychotherapy     Encounter Diagnoses   Name Primary? • Anxiety Yes         Goals addressed in session: Goal 1     DATA: Alonzo Chaudhry today about her feelings re: how disappointed she is in her children as a result of their lack of support re: their father's mental health struggles  Demi expressed concern about her 's mother's upcoming 80th birthday party and the potential issues that may arise  During this session, this clinician used the following therapeutic modalities: Supportive Psychotherapy    Substance Abuse was not addressed during this session  If the client is diagnosed with a co-occurring substance use disorder, please indicate any changes in the frequency or amount of use:   Stage of change for addressing substance use diagnoses: No substance use/Not applicable    ASSESSMENT:  Davide Valladares presents with a Euthymic/ normal mood  She continues to report feeling proud of her  as he has adjusted to his new job  [facility] appears reluctant to \"rock the boat\" by addressing conflict in family relationships  her affect is Normal range and intensity, which is congruent, with her mood and the content of the session  The client has made progress on their goals  Davide Valladares presents with a minimal risk of suicide, minimal risk of self-harm, and none risk of harm to others  For any risk assessment that surpasses a \"low\" rating, a safety plan must be developed  A safety plan was indicated: no  If yes, describe in detail     PLAN: Between sessions, Davide Valladares will continue to engage in acts of self care  At the next session, the therapist will use Supportive Psychotherapy to address the above in further detail  Behavioral Health Treatment Plan and Discharge Planning: Davide Valladares is aware of and agrees to continue to work on their treatment plan   They have identified and are working toward " their discharge goals   yes    Visit start and stop times:    04/05/23  Start Time: 1600  Stop Time: 1650  Total Visit Time: 50 minutes

## 2023-04-05 NOTE — BH TREATMENT PLAN
"[address] [place] [address]  1955     Date of Initial Psychotherapy Assessment: Date of Current Treatment Plan: 04/05/23  Treatment Plan Target Date: 4/4/23  Treatment Plan Expiration Date: 4/4/23    Diagnosis:   1  Anxiety            Area(s) of Need: I need someone to convince me that I am not \"going crazy  \"    Long Term Goal 1 (in the client's own words): I want to reinforce my growth so that I feel more empowered and peaceful  Stage of Change: Action    Target Date for completion: 10/4/23     Anticipated therapeutic modalities: Supportive Psychotherapy     People identified to complete this goal: Self      Objective 1: (identify the means of measuring success in meeting the objective): I will be more purposeful with my words / actions      Objective 2: (identify the means of measuring success in meeting the objective): I will continue to notice my \"shoulds\"        I am currently under the care of a [facility] psychiatric provider: no    My [facility] psychiatric provider is:     I am currently taking psychiatric medications: No    I feel that I will be ready for discharge from mental health care when I reach the following (measurable goal/objective): When I feel guaranteed that my anxiety will not overwhelm me  I have created my Crisis Plan and have been offered a copy of this plan    [address]: Diagnosis and Treatment Plan explained to [facility] acknowledges an understanding of their diagnosis  Jama Latif agrees to this treatment plan  I have been offered a copy of this Treatment Plan  yes    Jama Latif, 1955, actively participated in the review and update of this treatment plan Jama Latif  provided verbal consent on 4/5/2023 at 4 PM  The treatment plan was transcribed into the [employer] Record at a later time                                                              "

## 2023-04-25 ENCOUNTER — CONSULT (OUTPATIENT)
Dept: CARDIOLOGY CLINIC | Facility: CLINIC | Age: 68
End: 2023-04-25

## 2023-04-25 VITALS
HEART RATE: 69 BPM | BODY MASS INDEX: 28.48 KG/M2 | HEIGHT: 64 IN | WEIGHT: 166.8 LBS | DIASTOLIC BLOOD PRESSURE: 72 MMHG | SYSTOLIC BLOOD PRESSURE: 120 MMHG

## 2023-04-25 DIAGNOSIS — R06.09 DOE (DYSPNEA ON EXERTION): Primary | ICD-10-CM

## 2023-04-25 DIAGNOSIS — K44.9 HIATAL HERNIA: ICD-10-CM

## 2023-04-25 DIAGNOSIS — R93.1 AGATSTON CORONARY ARTERY CALCIUM SCORE BETWEEN 100 AND 199: ICD-10-CM

## 2023-04-25 DIAGNOSIS — K21.9 GASTROESOPHAGEAL REFLUX DISEASE, UNSPECIFIED WHETHER ESOPHAGITIS PRESENT: ICD-10-CM

## 2023-04-25 DIAGNOSIS — E78.5 HYPERLIPIDEMIA, UNSPECIFIED HYPERLIPIDEMIA TYPE: ICD-10-CM

## 2023-04-25 DIAGNOSIS — E66.3 OVERWEIGHT (BMI 25.0-29.9): ICD-10-CM

## 2023-04-25 DIAGNOSIS — Z87.898 HISTORY OF PREDIABETES: ICD-10-CM

## 2023-04-25 RX ORDER — ASPIRIN 81 MG/1
81 TABLET ORAL DAILY
Qty: 30 TABLET | Refills: 0 | Status: SHIPPED | OUTPATIENT
Start: 2023-04-25

## 2023-04-25 RX ORDER — ROSUVASTATIN CALCIUM 20 MG/1
20 TABLET, COATED ORAL DAILY
Qty: 90 TABLET | Refills: 3 | Status: SHIPPED | OUTPATIENT
Start: 2023-04-25

## 2023-04-25 NOTE — PROGRESS NOTES
Cardiology Consultation Visit       Jb sAhton   [id number]  1955      HEART & VASCULAR [facility] [address] [address] [address] [id number]      Physician Requesting Consult:   Candelario Valdez MD    Reason for Consultation / Principal Problem:  Mrs Jb Ashton is a 80 y o  female and never smoker with a PMH significant for but not limited to HLD, Prediabetes (resolved), GERD, DAVID and Overweight  She presents to clinic to establish care  History of Present Illness:  Mrs Beulah York was at her baseline state of health: independent of adl's, working as a CV RN and Educator, and exercising regularly with routine walking and strength training, when she was seen by her PCP and referred to Cardiology  She'd noted 20-lb weight gain during the COVID pandemic, and felt sluggish overall  Since seeing her PCP, she's noted occasional bouts of JUDD when ascending stairs and when singing at [employer]  She points out that she is able to complete treadmill workouts without similar dyspnea  She currently denies any associated cp, diaphoresis, n/v, sob at rest, palpitations, near syncope, syncope, orthopnea, pnd, or ble edema  She denies any recent cardiac hospitalizations, prior cardiac history beyond an elevated CCS, or family history of scd  She does note a relative with a MI at age 46, however  She notes good control of her diet and exercise habits  She reports a diet that is well balanced, sufficient daily water intake and regular exercise that includes a combination of vigorous aerobic activity each week and strength training at least twice a week  She is otherwise compliant with medications but does not maintain a detailed BP log  Of note, the patient's cardiac risk factor(s) include: advanced age (older than 54 for men, 72 for women), dyslipidemia, family history of premature cardiovascular disease and  delivery       Assessment & Plan    on 20  - tthe observed calcium score of 135 fits in the 86 percentile for patients of the same age, gender, and race/ethnicity who are free of clinical cardiovascular disease and treated diabetes  - the estimated WILCOX 10-year risk of a CHD event for her profile including coronary calcium is 5 3%  - the estimated 10-year risk of without factoring in her coronary calcium score would be 3 0%  Patient with notes of JUDD while ascending stairs, singing in [employer], but not while working out on the treadmill  Will proceed with ischemic testing by ETT to start  Monitor routinely for symptomatic changes, Record BP/HR and log if symptoms return, ED/Clinic precautions reviewed     HLD, most recent FLP: , TRI 48, HDL 74, and  on 03/31/23  Tolerating statin without significant adverse reactions  Cont current medication regimen and plan to gradually increase statin to maximally tolerated dose, cont counseling on diet and lifestyle modification, Cont counseling on diet and lifestyle modification, Will repeat FLP in 3-4 months  Monitor FLP as noted above, will reassess on next visit    Prediabetes, most recent A1c 5 6 on 03/31/23, down from 5 7 on 07/26/22  No prior medical mgmt, diet controlled  Cont counseling on diet and lifestyle changes  Monitor Blood Sugar/A1c routinely as ordered by PCP, will follow peripherally    Overweight, Body mass index is 28 99 kg/m²  Weight has been stable overall  Cont to review the importance of diet and lifestyle modification  Will monitor routinely at this time    Discussion / Summary:  Precautions and reasons to call our office or proceed to ER were discussed in detail  Patient expressed understanding and questions were answered  Plan on follow up in-clinic in 4 months  Office Visit Diagnosis:  1  JUDD (dyspnea on exertion)  POCT ECG    Echo stress test, exercise    aspirin (ECOTRIN LOW STRENGTH) 81 mg EC tablet      2   Agatston coronary artery calcium score between 100 and 199  Echo stress test, exercise    aspirin (ECOTRIN LOW STRENGTH) 81 mg EC tablet      3  Hyperlipidemia, unspecified hyperlipidemia type  rosuvastatin (CRESTOR) 20 MG tablet    Lipid panel    aspirin (ECOTRIN LOW STRENGTH) 81 mg EC tablet      4  Gastroesophageal reflux disease, unspecified whether esophagitis present        5  Hiatal hernia        6  Overweight (BMI 25 0-29 9)        7  History of prediabetes            Subjective   Review of Systems   Constitutional: Negative for chills and fever  HENT: Negative for rhinorrhea and sore throat  Eyes: Negative for pain and visual disturbance  Respiratory: Positive for shortness of breath (sob with ascending stairs and singing in [employer], none with treadmill workouts)  Negative for cough and wheezing  Cardiovascular: Negative for chest pain, palpitations and leg swelling  Gastrointestinal: Negative for abdominal pain, constipation, diarrhea and vomiting  Genitourinary: Negative for dysuria and hematuria  Musculoskeletal: Positive for arthralgias and back pain (after long shifts, but manageable)  Skin: Negative for rash  Neurological: Negative for syncope and light-headedness  Hematological: Does not bruise/bleed easily  Psychiatric/Behavioral: Negative for dysphoric mood  The patient is not nervous/anxious        The following portions of the patient's history were reviewed and updated as appropriate: past medical history, past social history, past family history, past surgical history, current medications, allergies, past surgical history and problem list   Past Medical History:   Diagnosis Date   • Anxiety    • Body mass index 27 0-27 9, adult    • Colon cancer screening    • Colon polyp    • Depression    • GERD (gastroesophageal reflux disease)     takes tums occasionaly   • GERD (gastroesophageal reflux disease)    • Hyperlipidemia    • Viral intestinal infection      Social History     Socioeconomic History   • Marital status: /Civil Union Spouse name: Not on file   • Number of children: Not on file   • Years of education: Not on file   • Highest education level: Not on file   Occupational History   • Not on file   Tobacco Use   • Smoking status: Never   • Smokeless tobacco: Never   Vaping Use   • Vaping Use: Never used   Substance and Sexual Activity   • Alcohol use: Yes     Alcohol/week: 1 0 standard drink     Types: 1 Cans of beer per week     Comment: weekly   • Drug use: No   • Sexual activity: Yes     Partners: Male   Other Topics Concern   • Not on file   Social History Narrative   • Not on file     Social Determinants of Health     Financial Resource Strain: Not on file   Food Insecurity: Not on file   Transportation Needs: Not on file   Physical Activity: Not on file   Stress: Not on file   Social Connections: Not on file   Intimate Partner Violence: Not on file   Housing Stability: Not on file     Family History   Problem Relation Age of Onset   • Endometrial cancer Mother 76   • COPD Mother    • Coronary artery disease Father    • No Known Problems Sister    • No Known Problems Daughter    • No Known Problems Maternal Grandmother    • No Known Problems Maternal Grandfather    • No Known Problems Paternal Grandmother    • No Known Problems Paternal Grandfather    • No Known Problems Sister    • No Known Problems Maternal Aunt    • No Known Problems Maternal Aunt    • No Known Problems Maternal Aunt    • No Known Problems Maternal Aunt    • No Known Problems Paternal Aunt    • No Known Problems Brother      Past Surgical History:   Procedure Laterality Date   • CARPAL TUNNEL RELEASE Bilateral    • CHOLECYSTECTOMY  08/2011   • COLONOSCOPY     • COLONOSCOPY N/A 10/18/2018    Procedure: COLONOSCOPY;  Surgeon: Zia Osei MD;  Location:  GI LAB;   Service: Gastroenterology   • CYSTOSCOPY     • CT CMBND ANTERPOST COLPORRAPHY W/CYSTO N/A 5/23/2016    Procedure: COLPORRHAPHY ANTERIOR POSTERIOR WITH GRAFT; ENTEROCELE REPAIR;  Surgeon: Devonte Chung Samia Gerard MD;  Location: [facility];  Service: UroGynecology          • IN COLONOSCOPY FLX DX W/COLLJ SPEC WHEN PFRMD N/A 10/17/2018    Procedure: COLONOSCOPY;  Surgeon: Zia Osei MD;  Location: [facility]; Service: Gastroenterology   • IN COLPOPEXY VAGINAL EXTRAPERITONEAL APPROACH N/A 5/23/2016    Procedure: COLPOPEXY VAGINAL EXTRAPERITONEAL (VEC);   Surgeon: Buck Billy MD;  Location: [facility];  Service: UroGynecology          • IN CYSTOURETHROSCOPY N/A 5/23/2016    Procedure: Job Lighter;  Surgeon: Buck Billy MD;  [facility];  Service: UroGynecology          • IN SLING OPERATION STRESS INCONTINENCE N/A 5/23/2016    Procedure: INSERTION PUBOVAGINAL SLING ;  Surgeon: Buck Billy MD;  [facility];  Service: UroGynecology          • TUBAL LIGATION         Current Outpatient Medications:   •  aspirin (ECOTRIN LOW STRENGTH) 81 mg EC tablet, Take 1 tablet (81 mg total) by mouth daily, Disp: 30 tablet, Rfl: 0  •  bisacodyl (DULCOLAX) 5 mg EC tablet, Take 1 tablet (5 mg total) by mouth daily as needed for constipation, Disp: 2 tablet, Rfl: 0  •  Calcium Carbonate-Vitamin D (CALCIUM 500/D PO), Take by mouth, Disp: , Rfl:   •  Cholecalciferol (VITAMIN D) 2000 units CAPS, Take by mouth, Disp: , Rfl:   •  docusate sodium (COLACE) 100 mg capsule, Take 100 mg by mouth in the morning, Disp: , Rfl:   •  estradiol (ESTRACE) 0 1 mg/g vaginal cream, Insert 1 g into vagina twice a week, Disp: 42 5 g, Rfl: 3  •  famotidine (PEPCID) 20 mg tablet, Take 1 tablet (20 mg total) by mouth 2 (two) times a day, Disp: 60 tablet, Rfl: 2  •  Multiple Vitamins-Minerals (MULTIVITAL-M PO), Take by mouth, Disp: , Rfl:   •  rosuvastatin (CRESTOR) 20 MG tablet, Take 1 tablet (20 mg total) by mouth daily, Disp: 90 tablet, Rfl: 3  •  sertraline (ZOLOFT) 50 mg tablet, Take 1 tablet (50 mg total) by mouth daily, Disp: 90 tablet, Rfl: 3  Allergies   Allergen Reactions   • Levaquin [Levofloxacin] Swelling and Other (See "Comments)     Fluid in knee   • Quinolones Other (See Comments)     And levaquin     Patient Active Problem List   Diagnosis   • Anxiety   • Lichen sclerosus   • GERD (gastroesophageal reflux disease)   • Pure hypercholesterolemia   • Hiatal hernia   • Chronic idiopathic constipation   • History of colon polyps       Objective     Vitals:    04/25/23 0850   BP: 120/72   BP Location: Left arm   Patient Position: Sitting   Cuff Size: Large   Pulse: 69   Weight: 75 7 kg (166 lb 12 8 oz)   Height: 5' 3 6\" (1 615 m)     Body mass index is 28 99 kg/m²  ? Physical Exam  Constitutional:       General: She is not in acute distress  Appearance: Normal appearance  She is not toxic-appearing  HENT:      Head: Normocephalic and atraumatic  Right Ear: External ear normal       Left Ear: External ear normal    Eyes:      General: No scleral icterus  Right eye: No discharge  Left eye: No discharge  Conjunctiva/sclera: Conjunctivae normal    Neck:      Vascular: No carotid bruit  Cardiovascular:      Rate and Rhythm: Normal rate and regular rhythm  Pulses: Normal pulses  Heart sounds: Normal heart sounds  No murmur heard  No gallop  Pulmonary:      Effort: Pulmonary effort is normal  No respiratory distress  Breath sounds: Normal breath sounds  No wheezing or rales  Musculoskeletal:      Cervical back: Neck supple  Right lower leg: No edema  Left lower leg: No edema  Skin:     General: Skin is warm and dry  Capillary Refill: Capillary refill takes less than 2 seconds  Neurological:      General: No focal deficit present  Mental Status: She is alert and oriented to person, place, and time  Mental status is at baseline  Motor: No weakness        Gait: Gait normal    Psychiatric:         Mood and Affect: Mood normal          Behavior: Behavior normal        Labs:  Lab Results   Component Value Date    K 4 1 07/08/2021    K 4 2 11/03/2019    K 4 2 " 10/16/2018     07/08/2021     (H) 11/03/2019     10/16/2018    CO2 28 07/08/2021    CO2 27 11/03/2019    CO2 29 10/16/2018    BUN 18 07/08/2021    BUN 17 11/03/2019    BUN 16 10/16/2018    CREATININE 0 71 07/08/2021    CREATININE 0 78 11/03/2019    CREATININE 0 87 10/16/2018    EGFR 90 07/08/2021    EGFR 81 11/03/2019    EGFR 72 10/16/2018    GLUC 92 07/08/2021    GLUC 87 12/28/2016    GLUC 89 05/05/2016    AST 23 07/08/2021    AST 18 11/03/2019    AST 25 10/16/2018    ALT 32 07/08/2021    ALT 31 11/03/2019    ALT 30 10/16/2018    TBILI 0 65 07/08/2021    TBILI 0 63 11/03/2019    TBILI 0 78 10/16/2018    ALB 3 6 07/08/2021    ALB 3 7 11/03/2019    ALB 3 9 10/16/2018    CALCIUM 9 4 07/08/2021    CALCIUM 9 6 11/03/2019    CALCIUM 9 5 10/16/2018      Lab Results   Component Value Date    WBC 6 90 07/08/2021    WBC 6 21 10/16/2018    WBC 8 10 12/28/2016    HGB 14 0 07/08/2021    HGB 14 2 10/16/2018    HGB 13 9 12/28/2016    HCT 42 8 07/08/2021    HCT 43 0 10/16/2018    HCT 41 5 12/28/2016     07/08/2021     10/16/2018     12/28/2016      Lab Results   Component Value Date    CHOLESTEROL 213 (H) 03/31/2023    CHOLESTEROL 250 (H) 07/26/2022    CHOLESTEROL 241 (H) 07/08/2021    TRIG 48 03/31/2023    TRIG 76 07/26/2022    TRIG 120 07/08/2021    TRIG 41 07/02/2015    TRIG 38 07/24/2014    HDL 74 03/31/2023    HDL 71 07/26/2022    HDL 64 07/08/2021    HDL 74 07/02/2015    HDL 82 07/24/2014    LDLCALC 129 (H) 03/31/2023    LDLCALC 164 (H) 07/26/2022    LDLCALC 153 (H) 07/08/2021    LDLCALC 146 (H) 07/02/2015    LDLCALC 123 (H) 07/24/2014     Lab Results   Component Value Date    HGBA1C 5 6 03/31/2023    HGBA1C 5 7 (H) 07/26/2022    HGBA1C 5 7 (H) 07/08/2021    HGBA1C 5 6 07/02/2015    GLUF 94 11/03/2019    GLUF 81 10/16/2018    GLUF 99 07/24/2014     No results found for: TSH, FT4  No results found for: HSTNI0, HSTNI2, HSTNI4, TROPONINI  No results found for: BNP, NTBNP     Imaging:   I have personally reviewed pertinent reports  No results found  CT CORONARY ARTERY CALCIUM SCREENING WITHOUT INTRAVENOUS CONTRAST  STUDY DATE: 11/22/20  FINDINGS:   Coronary artery calcium score breakdown is as follows:  Left main coronary artery:  18  Left anterior descending coronary artery and diagonal branches:  104  Left circumflex coronary artery and left marginal branches:  13  Right coronary artery and right marginal branches:  0  Posterior descending coronary artery: 0  TOTAL coronary calcium score:  135  IMPRESSION:  Total coronary calcium score equals 135  For more useful information regarding the prognostic significance of the calcium score, please consult the calculator at the website [email or web address] [employer]/  aspx  Cardiac Studies:  EKG:   Date: 04/25/23, normal sinus rhythm    Tele:   No results found for this or any previous visit  Holter:   No results found for this or any previous visit  Previous Cath/PCI:  No results found for this or any previous visit  Previous STRESS TEST:  No results found for this or any previous visit  ECHO:  No results found for this or any previous visit  PAULINA:  No results found for this or any previous visit  CMR:  No results found for this or any previous visit  Counseling / Coordination of Care:  During our visit, I spent 40 minutes with the patient, and greater than 60% of this time was spent on counseling and coordination of care, including addressing diagnostic results, prognosis, risks and benefits of treatment options, instructions for management, patient/family education, importance of treatment compliance, along with risk factor reductions  Dictation Disclaimer: This note was completed in part utilizing [employer] direct voice recognition software   Grammatical errors, random word insertion, spelling mistakes, and incomplete sentences may be an occasional consequence of the system secondary to software limitations, ambient noise and hardware issues  At the time of dictation, efforts were made to edit, clarify and /or correct errors  Please read the chart carefully and recognize, using context, where substitutions have occurred  If you have any questions or concerns about the context, text or information contained within the body of this dictation, please contact myself, the provider, for further clarification       Jr Asher, DO 04/25/23

## 2023-05-09 ENCOUNTER — OFFICE VISIT (OUTPATIENT)
Dept: OBGYN CLINIC | Facility: CLINIC | Age: 68
End: 2023-05-09

## 2023-05-09 ENCOUNTER — HOSPITAL ENCOUNTER (OUTPATIENT)
Dept: NON INVASIVE DIAGNOSTICS | Facility: CLINIC | Age: 68
Discharge: HOME | End: 2023-05-09

## 2023-05-09 VITALS
BODY MASS INDEX: 29.41 KG/M2 | WEIGHT: 166 LBS | HEIGHT: 63 IN | DIASTOLIC BLOOD PRESSURE: 70 MMHG | HEART RATE: 63 BPM | SYSTOLIC BLOOD PRESSURE: 110 MMHG

## 2023-05-09 VITALS
HEIGHT: 65 IN | DIASTOLIC BLOOD PRESSURE: 72 MMHG | WEIGHT: 170.4 LBS | BODY MASS INDEX: 28.39 KG/M2 | SYSTOLIC BLOOD PRESSURE: 116 MMHG

## 2023-05-09 DIAGNOSIS — L90.0 LICHEN SCLEROSUS: Primary | ICD-10-CM

## 2023-05-09 DIAGNOSIS — R06.09 DOE (DYSPNEA ON EXERTION): ICD-10-CM

## 2023-05-09 DIAGNOSIS — R93.1 AGATSTON CORONARY ARTERY CALCIUM SCORE BETWEEN 100 AND 199: ICD-10-CM

## 2023-05-09 LAB
CHEST PAIN STATEMENT: NORMAL
MAX DIASTOLIC BP: 70 MMHG
MAX HEART RATE: 146 BPM
MAX HR PERCENT: 95 %
MAX HR: 146 BPM
MAX PREDICTED HEART RATE: 153 BPM
MAX. SYSTOLIC BP: 182 MMHG
POST LVEF: 70 %
PROTOCOL NAME: NORMAL
RATE PRESSURE PRODUCT: NORMAL
REASON FOR TERMINATION: NORMAL
SL CV LV EF: 60
SL CV STRESS RECOVERY BP: NORMAL MMHG
SL CV STRESS RECOVERY HR: 80 BPM
SL CV STRESS RECOVERY O2 SAT: 98 %
SL CV STRESS STAGE REACHED: 3
STRESS ANGINA INDEX: 0
STRESS BASELINE BP: NORMAL MMHG
STRESS BASELINE HR: 73 BPM
STRESS O2 SAT REST: 97 %
STRESS PEAK HR: 93 BPM
STRESS POST ESTIMATED WORKLOAD: 10.1 METS
STRESS POST EXERCISE DUR MIN: 9 MIN
STRESS POST EXERCISE DUR SEC: 0 SEC
STRESS POST O2 SAT PEAK: 98 %
STRESS POST PEAK BP: 182 MMHG
TARGET HR FORMULA: NORMAL
TEST INDICATION: NORMAL
TIME IN EXERCISE PHASE: NORMAL

## 2023-05-13 ENCOUNTER — OFFICE VISIT (OUTPATIENT)
Dept: URGENT CARE | Age: 68
End: 2023-05-13

## 2023-05-13 VITALS — RESPIRATION RATE: 16 BRPM | OXYGEN SATURATION: 98 % | HEART RATE: 70 BPM | TEMPERATURE: 97.4 F

## 2023-05-13 DIAGNOSIS — L23.7 POISON IVY DERMATITIS: Primary | ICD-10-CM

## 2023-05-13 RX ORDER — METHYLPREDNISOLONE 4 MG/1
TABLET ORAL
Qty: 21 TABLET | Refills: 0 | Status: SHIPPED | OUTPATIENT
Start: 2023-05-13 | End: 2023-05-19

## 2023-05-13 NOTE — PATIENT INSTRUCTIONS
Please take Medrol Dosepak as ordered  Continue with topical relief cream as needed  Continue with Benadryl as needed

## 2023-05-13 NOTE — PROGRESS NOTES
[facility]        NAME: Alicia Jimenes is a 80 y o  female  : 1955    MRN: [id number]  DATE: May 13, 2023  TIME: 10:41 AM    Assessment and Plan   Poison ivy dermatitis [L23 7]  1  Poison ivy dermatitis  methylPREDNISolone 4 MG tablet therapy pack            Patient Instructions     Please take Medrol Dosepak as ordered  Continue with topical relief cream as needed  Continue with Benadryl as needed  Follow up with PCP in 3-5 days  Proceed to  ER if symptoms worsen  Chief Complaint   No chief complaint on file  History of Present Illness       Patient presenting for evaluation of acute rash  Patient states that over the past 3 days she has been having a worsening pruritic rash on her chest   She states that it is migrating to her neck and behind her ears  She states that she was recently gardening, and was exposed to poison ivy  She states that she has been taking Benadryl and using over-the-counter poison ivy treatment with minimal relief  She denies any fevers, chills, chest pain or shortness of breath  Review of Systems   Review of Systems   Constitutional: Negative for chills and fever  Respiratory: Negative for shortness of breath  Cardiovascular: Negative for chest pain  Skin: Positive for rash  All other systems reviewed and are negative  Current Medications       Current Outpatient Medications:   •  methylPREDNISolone 4 MG tablet therapy pack, Take 6 tablets (24 mg total) by mouth daily for 1 day, THEN 5 tablets (20 mg total) daily for 1 day, THEN 4 tablets (16 mg total) daily for 1 day, THEN 3 tablets (12 mg total) daily for 1 day, THEN 2 tablets (8 mg total) daily for 1 day, THEN 1 tablet (4 mg total) daily for 1 day  Use as directed on package  , Disp: 21 tablet, Rfl: 0  •  aspirin (ECOTRIN LOW STRENGTH) 81 mg EC tablet, Take 1 tablet (81 mg total) by mouth daily, Disp: 30 tablet, Rfl: 0  •  bisacodyl (DULCOLAX) 5 mg EC tablet, Take 1 tablet (5 mg total) by mouth daily as needed for constipation, Disp: 2 tablet, Rfl: 0  •  Calcium Carbonate-Vitamin D (CALCIUM 500/D PO), Take by mouth, Disp: , Rfl:   •  Cholecalciferol (VITAMIN D) 2000 units CAPS, Take by mouth, Disp: , Rfl:   •  docusate sodium (COLACE) 100 mg capsule, Take 100 mg by mouth in the morning, Disp: , Rfl:   •  estradiol (ESTRACE) 0 1 mg/g vaginal cream, Insert 1 g into vagina twice a week, Disp: 42 5 g, Rfl: 3  •  famotidine (PEPCID) 20 mg tablet, Take 1 tablet (20 mg total) by mouth 2 (two) times a day, Disp: 60 tablet, Rfl: 2  •  Multiple Vitamins-Minerals (MULTIVITAL-M PO), Take by mouth, Disp: , Rfl:   •  rosuvastatin (CRESTOR) 20 MG tablet, Take 1 tablet (20 mg total) by mouth daily, Disp: 90 tablet, Rfl: 3  •  sertraline (ZOLOFT) 50 mg tablet, Take 1 tablet (50 mg total) by mouth daily, Disp: 90 tablet, Rfl: 3    Current Allergies     Allergies as of 05/13/2023 - Reviewed 05/13/2023   Allergen Reaction Noted   • Levaquin [levofloxacin] Swelling and Other (See Comments) 05/20/2016   • Quinolones Other (See Comments) 05/20/2016            The following portions of the patient's history were reviewed and updated as appropriate: allergies, current medications, past family history, past medical history, past social history, past surgical history and problem list      Past Medical History:   Diagnosis Date   • Anxiety    • Body mass index 27 0-27 9, adult    • Colon cancer screening    • Colon polyp    • Depression    • GERD (gastroesophageal reflux disease)     takes tums occasionaly   • GERD (gastroesophageal reflux disease)    • Hyperlipidemia    • Viral intestinal infection        Past Surgical History:   Procedure Laterality Date   • CARPAL TUNNEL RELEASE Bilateral    • CHOLECYSTECTOMY  08/2011   • COLONOSCOPY     • COLONOSCOPY N/A 10/18/2018    Procedure: COLONOSCOPY;  Surgeon: Anna Gomez MD;  [facility];   Service: Gastroenterology   • CYSTOSCOPY     • [place] CMBND ANTERPOST COLPORRAPHY W/CYSTO N/A 5/23/2016    Procedure: COLPORRHAPHY ANTERIOR POSTERIOR WITH GRAFT; ENTEROCELE REPAIR;  Surgeon: Fili Poole MD;  [facility];  Service: UroGynecology          • ND COLONOSCOPY FLX DX W/COLLJ SPEC WHEN PFRMD N/A 10/17/2018    Procedure: COLONOSCOPY;  Surgeon: Nat Carrasco MD;  [facility]; Service: Gastroenterology   • ND COLPOPEXY VAGINAL EXTRAPERITONEAL APPROACH N/A 5/23/2016    Procedure: COLPOPEXY VAGINAL EXTRAPERITONEAL (VEC); Surgeon: Fili Poole MD;  [facility];  Service: UroGynecology          • ND CYSTOURETHROSCOPY N/A 5/23/2016    Procedure: Obed Sierra;  Surgeon: Fili Poole MD;  [facility];  Service: UroGynecology          • ND SLING OPERATION STRESS INCONTINENCE N/A 5/23/2016    Procedure: INSERTION PUBOVAGINAL SLING ;  Surgeon: Fili Poole MD;  [facility];  Service: UroGynecology          • TUBAL LIGATION         Family History   Problem Relation Age of Onset   • Endometrial cancer Mother 76   • COPD Mother    • Coronary artery disease Father    • Heart disease Father    • No Known Problems Sister    • No Known Problems Daughter    • No Known Problems Maternal Grandmother    • No Known Problems Maternal Grandfather    • No Known Problems Paternal Grandmother    • No Known Problems Paternal Grandfather    • No Known Problems Sister    • No Known Problems Maternal Aunt    • No Known Problems Maternal Aunt    • No Known Problems Maternal Aunt    • No Known Problems Maternal Aunt    • No Known Problems Paternal Aunt    • No Known Problems Brother          Medications have been verified  Objective   Pulse 70   Temp (!) 97 4 °F (36 3 °C)   Resp 16   SpO2 98%        Physical Exam     Physical Exam  Vitals and nursing note reviewed  Constitutional:       General: She is not in acute distress  Appearance: Normal appearance  She is normal weight  She is not ill-appearing, toxic-appearing or diaphoretic  HENT:      Head: Normocephalic and atraumatic  Mouth/Throat:      Pharynx: No oropharyngeal exudate or posterior oropharyngeal erythema  Eyes:      General:         Right eye: No discharge  Left eye: No discharge  Cardiovascular:      Pulses: Normal pulses  Heart sounds: Normal heart sounds  No murmur heard  No friction rub  No gallop  Pulmonary:      Effort: Pulmonary effort is normal  No respiratory distress  Breath sounds: Normal breath sounds  No stridor  No wheezing, rhonchi or rales  Chest:      Chest wall: No tenderness  Abdominal:      General: Bowel sounds are normal       Palpations: Abdomen is soft  Tenderness: There is no abdominal tenderness  Skin:     General: Skin is warm and dry  Findings: Erythema and rash present  Rash is macular, papular and vesicular  Neurological:      Mental Status: She is alert     Psychiatric:         Mood and Affect: Mood normal          Behavior: Behavior normal

## 2023-05-16 NOTE — PROGRESS NOTES
"Assessment/Plan:    Lichen sclerosus     Follow up for annual exam    Clobetasol PRN    Subjective:      Patient ID: Kyung Ayala is a 80 y o  female  Mackenzie Paez presents for follow up of lichen sclerosis  Was given new rx for clobetasol ointment at her annual exam   She has used as prescribed and reports she is feeling better  The following portions of the patient's history were reviewed and updated as appropriate: allergies, current medications and problem list     Review of Systems      Objective:      /72 (BP Location: Left arm, Patient Position: Sitting, Cuff Size: Standard)   Ht 5' 4 57\" (1 64 m)   Wt 77 3 kg (170 lb 6 4 oz)   BMI 28 74 kg/m²          Physical Exam  Vitals reviewed  Constitutional:       Appearance: Normal appearance  Genitourinary:     Comments: Lichen changes, improved from previously  No discrete concern lesions  Atrophic changes  Neurological:      Mental Status: She is alert           "

## 2023-05-17 ENCOUNTER — SOCIAL WORK (OUTPATIENT)
Dept: BEHAVIORAL/MENTAL HEALTH CLINIC | Facility: CLINIC | Age: 68
End: 2023-05-17

## 2023-05-17 DIAGNOSIS — F41.9 ANXIETY: Primary | ICD-10-CM

## 2023-05-18 NOTE — PSYCH
"Behavioral Health Psychotherapy Progress Note    Psychotherapy Provided: Individual Psychotherapy     Encounter Diagnoses   Name Primary? • Anxiety Yes         Goals addressed in session: Goal 1     DATA: Ether Ahr today about her feelings re: the progress she has made in [place]" of concerns re: her children's relationship with their father  She indicated that this change has been relieving  In addition, she shared the relief she felt after speaking to a colleague re: her 's termination  During this session, this clinician used the following therapeutic modalities: Supportive Psychotherapy    Substance Abuse was not addressed during this session  If the client is diagnosed with a co-occurring substance use disorder, please indicate any changes in the frequency or amount of use:   Stage of change for addressing substance use diagnoses: No substance use/Not applicable    ASSESSMENT:  Blossom Dent presents with a Euthymic/ normal mood  She continues to report feeling proud of her growth and progress, particularly as she has improved her self care  Kellykatina Torres appears reluctant to \"rock the boat\" by addressing conflict in family relationships  her affect is Normal range and intensity, which is congruent, with her mood and the content of the session  The client has made progress on their goals  Blossom Dent presents with a minimal risk of suicide, minimal risk of self-harm, and none risk of harm to others  For any risk assessment that surpasses a \"low\" rating, a safety plan must be developed  A safety plan was indicated: no  If yes, describe in detail     PLAN: Between sessions, Blossom Dent will continue to engage in acts of self care  At the next session, the therapist will use Supportive Psychotherapy to address the above in further detail      Behavioral Health Treatment Plan and Discharge Planning: Blossom Dent is aware of and agrees to continue to work on their treatment " plan  They have identified and are working toward their discharge goals   yes    Visit start and stop times:    05/17/23  Start Time: 1600  Stop Time: 1650  Total Visit Time: 50 minutes

## 2023-05-21 ENCOUNTER — OFFICE VISIT (OUTPATIENT)
Dept: URGENT CARE | Age: 68
End: 2023-05-21

## 2023-05-21 VITALS
TEMPERATURE: 97.2 F | DIASTOLIC BLOOD PRESSURE: 72 MMHG | RESPIRATION RATE: 18 BRPM | HEART RATE: 88 BPM | SYSTOLIC BLOOD PRESSURE: 110 MMHG | OXYGEN SATURATION: 99 %

## 2023-05-21 DIAGNOSIS — L30.9 DERMATITIS: Primary | ICD-10-CM

## 2023-05-21 RX ORDER — PREDNISONE 20 MG/1
20 TABLET ORAL 2 TIMES DAILY WITH MEALS
Qty: 10 TABLET | Refills: 0 | Status: SHIPPED | OUTPATIENT
Start: 2023-05-21 | End: 2023-05-26

## 2023-05-21 NOTE — PROGRESS NOTES
[facility]        NAME: Mirela Rogers is a 80 y o  female  : 1955    MRN: [id number]  DATE: May 21, 2023  TIME: 4:18 PM    Assessment and Plan   Dermatitis [L30 9]  1  Dermatitis  predniSONE 20 mg tablet            Patient Instructions     Take medication as prescribed  Follow up with PCP in 3-5 days  Proceed to  ER if symptoms worsen  Chief Complaint     Chief Complaint   Patient presents with   • Rash     Patient states that she was here last weekend for a rash that she was told was poison ivy  She was given solumedrol but it has gotten worse          History of Present Illness       HPI   Presents to clinic with complaint of itching on rash mainly on the upper body in the area of the upper chest and neck  Treated recently with steroid Dosepak for poison ivy in these areas  After completion the rash had improved with the itching and a couple days later the itchiness restarted  Review of Systems   Review of Systems   HENT: Negative for drooling  Respiratory: Negative for shortness of breath and wheezing  Cardiovascular: Negative for chest pain  Skin: Positive for rash  Negative for wound  Neurological: Negative for numbness           Current Medications       Current Outpatient Medications:   •  aspirin (ECOTRIN LOW STRENGTH) 81 mg EC tablet, Take 1 tablet (81 mg total) by mouth daily, Disp: 30 tablet, Rfl: 0  •  bisacodyl (DULCOLAX) 5 mg EC tablet, Take 1 tablet (5 mg total) by mouth daily as needed for constipation, Disp: 2 tablet, Rfl: 0  •  Calcium Carbonate-Vitamin D (CALCIUM 500/D PO), Take by mouth, Disp: , Rfl:   •  Cholecalciferol (VITAMIN D) 2000 units CAPS, Take by mouth, Disp: , Rfl:   •  docusate sodium (COLACE) 100 mg capsule, Take 100 mg by mouth in the morning, Disp: , Rfl:   •  estradiol (ESTRACE) 0 1 mg/g vaginal cream, Insert 1 g into vagina twice a week, Disp: 42 5 g, Rfl: 3  •  famotidine (PEPCID) 20 mg tablet, Take 1 tablet (20 mg total) by mouth 2 (two) times a day, Disp: 60 tablet, Rfl: 2  •  Multiple Vitamins-Minerals (MULTIVITAL-M PO), Take by mouth, Disp: , Rfl:   •  predniSONE 20 mg tablet, Take 1 tablet (20 mg total) by mouth 2 (two) times a day with meals for 5 days, Disp: 10 tablet, Rfl: 0  •  rosuvastatin (CRESTOR) 20 MG tablet, Take 1 tablet (20 mg total) by mouth daily, Disp: 90 tablet, Rfl: 3  •  sertraline (ZOLOFT) 50 mg tablet, Take 1 tablet (50 mg total) by mouth daily, Disp: 90 tablet, Rfl: 3    Current Allergies     Allergies as of 05/21/2023 - Reviewed 05/21/2023   Allergen Reaction Noted   • Levaquin [levofloxacin] Swelling and Other (See Comments) 05/20/2016   • Quinolones Other (See Comments) 05/20/2016            The following portions of the patient's history were reviewed and updated as appropriate: allergies, current medications, past family history, past medical history, past social history, past surgical history and problem list      Past Medical History:   Diagnosis Date   • Anxiety    • Body mass index 27 0-27 9, adult    • Colon cancer screening    • Colon polyp    • Depression    • GERD (gastroesophageal reflux disease)     takes tums occasionaly   • GERD (gastroesophageal reflux disease)    • Hyperlipidemia    • Viral intestinal infection        Past Surgical History:   Procedure Laterality Date   • CARPAL TUNNEL RELEASE Bilateral    • CHOLECYSTECTOMY  08/2011   • COLONOSCOPY     • COLONOSCOPY N/A 10/18/2018    Procedure: COLONOSCOPY;  Surgeon: Denton Hernández MD;  [facility]; Service: Gastroenterology   • CYSTOSCOPY     • [place] CMBND ANTERPOST COLPORRAPHY W/CYSTO N/A 5/23/2016    Procedure: COLPORRHAPHY ANTERIOR POSTERIOR WITH GRAFT; ENTEROCELE REPAIR;  Surgeon: Jany Robertson MD;  [facility];  Service: UroGynecology          • [place] COLONOSCOPY FLX DX W/COLLJ SPEC WHEN PFRMD N/A 10/17/2018    Procedure: COLONOSCOPY;  Surgeon: Denton Hernández MD;  [facility];   Service: Gastroenterology   • TX COLPOPEXY VAGINAL EXTRAPERITONEAL APPROACH N/A 5/23/2016    Procedure: COLPOPEXY VAGINAL EXTRAPERITONEAL (VEC); Surgeon: Bettina Pruitt MD;  [facility];  Service: UroGynecology          • ND CYSTOURETHROSCOPY N/A 5/23/2016    Procedure: Viet Paget;  Surgeon: Bettina Pruitt MD;  [facility];  Service: UroGynecology          • ND SLING OPERATION STRESS INCONTINENCE N/A 5/23/2016    Procedure: INSERTION PUBOVAGINAL SLING ;  Surgeon: Bettina Pruitt MD;  [facility];  Service: UroGynecology          • TUBAL LIGATION         Family History   Problem Relation Age of Onset   • Endometrial cancer Mother 76   • COPD Mother    • Coronary artery disease Father    • Heart disease Father    • No Known Problems Sister    • No Known Problems Daughter    • No Known Problems Maternal Grandmother    • No Known Problems Maternal Grandfather    • No Known Problems Paternal Grandmother    • No Known Problems Paternal Grandfather    • No Known Problems Sister    • No Known Problems Maternal Aunt    • No Known Problems Maternal Aunt    • No Known Problems Maternal Aunt    • No Known Problems Maternal Aunt    • No Known Problems Paternal Aunt    • No Known Problems Brother          Medications have been verified  Objective   /72   Pulse 88   Temp (!) 97 2 °F (36 2 °C)   Resp 18   SpO2 99%   No LMP recorded  Patient is postmenopausal        Physical Exam     Physical Exam  Constitutional:       Appearance: She is not ill-appearing or diaphoretic  Skin:     Findings: Erythema (mild) and rash (With itching  Located mostly in the upper chest above the breast and around the neck and surrounding skin at the base of the neck  No rash on the back of the neck  Some on the abdomen, on the left side midway between the waist on the rib cage) present

## 2023-05-23 ENCOUNTER — HOSPITAL ENCOUNTER (OUTPATIENT)
Dept: RADIOLOGY | Facility: IMAGING CENTER | Age: 68
Discharge: HOME | End: 2023-05-23

## 2023-05-23 VITALS — HEIGHT: 65 IN | BODY MASS INDEX: 27.47 KG/M2 | WEIGHT: 164.9 LBS

## 2023-05-23 DIAGNOSIS — Z12.31 ENCOUNTER FOR SCREENING MAMMOGRAM FOR MALIGNANT NEOPLASM OF BREAST: ICD-10-CM

## 2023-06-10 ENCOUNTER — OFFICE VISIT (OUTPATIENT)
Dept: URGENT CARE | Age: 68
End: 2023-06-10
Payer: COMMERCIAL

## 2023-06-10 ENCOUNTER — APPOINTMENT (OUTPATIENT)
Dept: RADIOLOGY | Age: 68
End: 2023-06-10
Payer: COMMERCIAL

## 2023-06-10 VITALS
SYSTOLIC BLOOD PRESSURE: 108 MMHG | OXYGEN SATURATION: 98 % | HEART RATE: 74 BPM | RESPIRATION RATE: 12 BRPM | DIASTOLIC BLOOD PRESSURE: 64 MMHG | TEMPERATURE: 97.4 F

## 2023-06-10 DIAGNOSIS — M25.551 RIGHT HIP PAIN: ICD-10-CM

## 2023-06-10 DIAGNOSIS — M25.551 RIGHT HIP PAIN: Primary | ICD-10-CM

## 2023-06-10 PROCEDURE — 73502 X-RAY EXAM HIP UNI 2-3 VIEWS: CPT

## 2023-06-10 PROCEDURE — 99213 OFFICE O/P EST LOW 20 MIN: CPT | Performed by: PHYSICIAN ASSISTANT

## 2023-06-10 RX ORDER — PREDNISONE 20 MG/1
40 TABLET ORAL DAILY
Qty: 10 TABLET | Refills: 0 | Status: SHIPPED | OUTPATIENT
Start: 2023-06-10 | End: 2023-06-15

## 2023-06-10 RX ORDER — METHOCARBAMOL 500 MG/1
500 TABLET, FILM COATED ORAL 3 TIMES DAILY PRN
Qty: 21 TABLET | Refills: 0 | Status: SHIPPED | OUTPATIENT
Start: 2023-06-10

## 2023-06-10 NOTE — PROGRESS NOTES
[facility]        NAME: Angie Locke is a 80 y o  female  : 1955    MRN: [id number]  DATE: Sonal 10, 2023  TIME: 7:15 PM    Assessment and Plan   Right hip pain [M25 551]  1  Right hip pain  XR hip/pelv 2-3 vws right if performed    predniSONE 20 mg tablet    methocarbamol (ROBAXIN) 500 mg tablet            Patient Instructions     Continue to monitor symptoms  If new or worsening symptoms develop, go immediately to Er  Drink plenty of fluids  Follow up with Family Doctor this week  Chief Complaint     Chief Complaint   Patient presents with   • Hip Pain     Right hip pain since          History of Present Illness       Pt has PMH arthritis of b/l hips  Despite that she is very active  She walks on treadmil 2 miles 3x a week  She states last  she went to [facility] and was on her feet all day  She woke up the next morning with severe R sided hip pain  Has been using heat and advil with no relief-mild relief  No acute injury or fall  No other sx  Pt walks into exam room and does not need any assistive devices  Review of Systems   Review of Systems   Constitutional: Negative  Negative for chills, fatigue and fever  HENT: Negative  Eyes: Negative  Respiratory: Negative  Negative for chest tightness, shortness of breath and wheezing  Cardiovascular: Negative  Negative for chest pain and palpitations  Gastrointestinal: Negative for abdominal pain, constipation, diarrhea, nausea and vomiting  Endocrine: Negative  Genitourinary: Negative for dysuria, flank pain, frequency, pelvic pain, vaginal discharge and vaginal pain  Musculoskeletal: Positive for gait problem  Negative for back pain, neck pain and neck stiffness  Skin: Negative  Negative for pallor and rash  Allergic/Immunologic: Negative  Neurological: Negative for weakness and numbness  Hematological: Negative  Psychiatric/Behavioral: Negative            Current Medications       Current Outpatient Medications:   •  methocarbamol (ROBAXIN) 500 mg tablet, Take 1 tablet (500 mg total) by mouth 3 (three) times a day as needed for muscle spasms, Disp: 21 tablet, Rfl: 0  •  predniSONE 20 mg tablet, Take 2 tablets (40 mg total) by mouth daily for 5 days, Disp: 10 tablet, Rfl: 0  •  aspirin (ECOTRIN LOW STRENGTH) 81 mg EC tablet, Take 1 tablet (81 mg total) by mouth daily, Disp: 30 tablet, Rfl: 0  •  bisacodyl (DULCOLAX) 5 mg EC tablet, Take 1 tablet (5 mg total) by mouth daily as needed for constipation, Disp: 2 tablet, Rfl: 0  •  Calcium Carbonate-Vitamin D (CALCIUM 500/D PO), Take by mouth, Disp: , Rfl:   •  Cholecalciferol (VITAMIN D) 2000 units CAPS, Take by mouth, Disp: , Rfl:   •  docusate sodium (COLACE) 100 mg capsule, Take 100 mg by mouth in the morning, Disp: , Rfl:   •  estradiol (ESTRACE) 0 1 mg/g vaginal cream, Insert 1 g into vagina twice a week, Disp: 42 5 g, Rfl: 3  •  famotidine (PEPCID) 20 mg tablet, Take 1 tablet (20 mg total) by mouth 2 (two) times a day, Disp: 60 tablet, Rfl: 2  •  Multiple Vitamins-Minerals (MULTIVITAL-M PO), Take by mouth, Disp: , Rfl:   •  rosuvastatin (CRESTOR) 20 MG tablet, Take 1 tablet (20 mg total) by mouth daily, Disp: 90 tablet, Rfl: 3  •  sertraline (ZOLOFT) 50 mg tablet, Take 1 tablet (50 mg total) by mouth daily, Disp: 90 tablet, Rfl: 3    Current Allergies     Allergies as of 06/10/2023 - Reviewed 06/10/2023   Allergen Reaction Noted   • Levaquin [levofloxacin] Swelling and Other (See Comments) 05/20/2016   • Quinolones Other (See Comments) 05/20/2016            The following portions of the patient's history were reviewed and updated as appropriate: allergies, current medications, past family history, past medical history, past social history, past surgical history and problem list      Past Medical History:   Diagnosis Date   • Anxiety    • Body mass index 27 0-27 9, adult    • Colon cancer screening    • Colon polyp    • Depression    • GERD (gastroesophageal reflux disease)     takes tums occasionaly   • GERD (gastroesophageal reflux disease)    • Hyperlipidemia    • Viral intestinal infection        Past Surgical History:   Procedure Laterality Date   • CARPAL TUNNEL RELEASE Bilateral    • CHOLECYSTECTOMY  08/2011   • COLONOSCOPY     • COLONOSCOPY N/A 10/18/2018    Procedure: COLONOSCOPY;  Surgeon: Kel Jung MD;  [facility]; Service: Gastroenterology   • CYSTOSCOPY     • [place] CMBND ANTERPOST COLPORRAPHY W/CYSTO N/A 5/23/2016    Procedure: COLPORRHAPHY ANTERIOR POSTERIOR WITH GRAFT; ENTEROCELE REPAIR;  Surgeon: Phillip Saez MD;  [facility];  Service: UroGynecology          • [place] COLONOSCOPY FLX DX W/COLLJ SPEC WHEN PFRMD N/A 10/17/2018    Procedure: COLONOSCOPY;  Surgeon: Kel Jung MD;  [facility]; Service: Gastroenterology   • OH COLPOPEXY VAGINAL EXTRAPERITONEAL APPROACH N/A 5/23/2016    Procedure: COLPOPEXY VAGINAL EXTRAPERITONEAL (VEC);   Surgeon: Phillip Saez MD;  [facility];  Service: UroGynecology          • OH CYSTOURETHROSCOPY N/A 5/23/2016    Procedure: Satnam Low;  Surgeon: Phillip Saez MD;  [facility];  Service: UroGynecology          • OH SLING OPERATION STRESS INCONTINENCE N/A 5/23/2016    Procedure: INSERTION PUBOVAGINAL SLING ;  Surgeon: Phillip Saez MD;  [facility];  Service: UroGynecology          • TUBAL LIGATION         Family History   Problem Relation Age of Onset   • Endometrial cancer Mother 76   • COPD Mother    • Coronary artery disease Father    • Heart disease Father    • No Known Problems Sister    • No Known Problems Sister    • No Known Problems Daughter    • No Known Problems Maternal Grandmother    • No Known Problems Maternal Grandfather    • No Known Problems Paternal Grandmother    • No Known Problems Paternal Grandfather    • No Known Problems Brother    • No Known Problems Maternal Aunt    • No Known Problems Maternal Aunt    • No Known Problems Maternal Aunt    • No Known Problems Maternal Aunt    • No Known Problems Paternal Aunt    • No Known Problems Son          Medications have been verified  Objective   /64 (BP Location: Left arm, Patient Position: Sitting, Cuff Size: Large)   Pulse 74   Temp (!) 97 4 °F (36 3 °C) (Temporal)   Resp 12   SpO2 98%        Physical Exam     Physical Exam  Vitals and nursing note reviewed  Constitutional:       General: She is not in acute distress  Appearance: Normal appearance  She is well-developed  She is not ill-appearing or diaphoretic  HENT:      Head: Normocephalic and atraumatic  Cardiovascular:      Rate and Rhythm: Normal rate and regular rhythm  Heart sounds: Normal heart sounds  Pulmonary:      Effort: Pulmonary effort is normal  No respiratory distress  Breath sounds: Normal breath sounds  No wheezing, rhonchi or rales  Musculoskeletal:      Comments: FROM R hip but has pain with flexion and external rotation  Walks with normal gait  No gross swelling or deformity  No TTP  Skin:     General: Skin is warm  Capillary Refill: Capillary refill takes less than 2 seconds  Coloration: Skin is not pale  Findings: No rash  Neurological:      Mental Status: She is alert

## 2023-06-10 NOTE — PATIENT INSTRUCTIONS
Rest, Ice, and Elevate limb  Continue to monitor symptoms  If symptoms do not improve in one week, follow up with orthopedics  Call [facility] Echols [phone] to schedule and appointment  If new or worsening symptoms occur, go immediately to the ER  Hip Pain   WHAT YOU NEED TO KNOW:   Hip pain can be caused by a number of conditions, such as bursitis, arthritis, or muscle or tendon strain  You may have swelling in the fluid-filled sacs that protect your muscles and tendons  Hip pain can also be caused by a lower back problem  Hip pain may be caused by trauma, playing sports, or running  Pain may start in your hip and go to your thigh, buttock, or groin  DISCHARGE INSTRUCTIONS:   Medicines:   NSAIDs , such as ibuprofen, help decrease swelling, pain, and fever  This medicine is available with or without a doctor's order  NSAIDs can cause stomach bleeding or kidney problems in certain people  If you take blood thinner medicine, always ask your healthcare provider if NSAIDs are safe for you  Always read the medicine label and follow directions  Take your medicine as directed  Contact your healthcare provider if you think your medicine is not helping or if you have side effects  Tell your provider if you are allergic to any medicine  Keep a list of the medicines, vitamins, and herbs you take  Include the amounts, and when and why you take them  Bring the list or the pill bottles to follow-up visits  Carry your medicine list with you in case of an emergency  Return to the emergency department if:   Your pain gets worse  You have numbness in your leg or toes  You cannot put any weight on or move your hip  Contact your healthcare provider if:   You have a fever  Your pain does not decrease, even after treatment  You have questions or concerns about your condition or care  Follow up with your healthcare provider as directed:   You may need physical therapy, an injection, or more testing  You may need to see an orthopedic specialist  Write down your questions so you remember to ask them during your visits  Manage your hip pain:   Rest  your injured hip so that it can heal  You may need to avoid putting any weight on your hip for at least 48 hours  Return to normal activities as directed  Ice  the injury for 20 minutes every 4 hours, or as directed  Use an ice pack, or put crushed ice in a plastic bag  Cover it with a towel to protect your skin  Ice helps prevent tissue damage and decreases swelling and pain  Elevate  your injured hip above the level of your heart as often as you can  This will help decrease swelling and pain  If possible, prop your hip and leg on pillows or blankets to keep the area elevated comfortably  Maintain a healthy weight  Extra body weight can cause pressure and pain in your hip, knee, and ankle joints  Ask your healthcare provider how much you should weigh  Ask him or her to help you create a weight loss plan if you are overweight  Use assistive devices as directed  You may need to use a cane or crutches  Assistive devices help decrease pain and pressure on your hip when you walk  Ask your healthcare provider for more information about assistive devices and how to use them correctly  © Copyright [employer] 2022 Information is for End User's use only and may not be sold, redistributed or otherwise used for commercial purposes  The above information is an  only  It is not intended as medical advice for individual conditions or treatments  Talk to your doctor, nurse or pharmacist before following any medical regimen to see if it is safe and effective for you

## 2023-06-14 ENCOUNTER — SOCIAL WORK (OUTPATIENT)
Dept: BEHAVIORAL/MENTAL HEALTH CLINIC | Facility: CLINIC | Age: 68
End: 2023-06-14
Payer: COMMERCIAL

## 2023-06-14 DIAGNOSIS — F41.9 ANXIETY: Primary | ICD-10-CM

## 2023-06-14 PROCEDURE — 90834 PSYTX W PT 45 MINUTES: CPT | Performed by: SOCIAL WORKER

## 2023-06-15 NOTE — PSYCH
"Behavioral Health Psychotherapy Progress Note    Psychotherapy Provided: Individual Psychotherapy     Encounter Diagnoses   Name Primary? • Anxiety Yes         Goals addressed in session: Goal 1     DATA: Indio Watson further today about her feelings re: the progress she has made in [place]" of concerns re: her children's relationship with their father  She indicated that she has informed her  of her boundaries re: further conversation re:\" his termination  During this session, this clinician used the following therapeutic modalities: Supportive Psychotherapy    Substance Abuse was not addressed during this session  If the client is diagnosed with a co-occurring substance use disorder, please indicate any changes in the frequency or amount of use:   Stage of change for addressing substance use diagnoses: No substance use/Not applicable    ASSESSMENT:  Carlos Merecr presents with a Euthymic/ normal mood  She has consistently expressed the desire to lose weight and remains frustrated that this is so difficult for her   her affect is Normal range and intensity, which is congruent, with her mood and the content of the session  The client has made progress on their goals  Carlos Mercer presents with a minimal risk of suicide, minimal risk of self-harm, and none risk of harm to others  For any risk assessment that surpasses a \"low\" rating, a safety plan must be developed  A safety plan was indicated: no  If yes, describe in detail     PLAN: Between sessions, Carlos Mercer will continue to engage in acts of self care  At the next session, the therapist will use Supportive Psychotherapy to address the above in further detail  Behavioral Health Treatment Plan and Discharge Planning: Carlos Mercer is aware of and agrees to continue to work on their treatment plan  They have identified and are working toward their discharge goals   yes    Visit start and stop times:    06/14/23    Start Time: " 1700  Stop Time: 1750  Total Visit Time: 50 minutes

## 2023-07-07 ENCOUNTER — HOSPITAL ENCOUNTER (OUTPATIENT)
Dept: ULTRASOUND IMAGING | Facility: CLINIC | Age: 68
End: 2023-07-07
Payer: COMMERCIAL

## 2023-07-07 ENCOUNTER — HOSPITAL ENCOUNTER (OUTPATIENT)
Dept: MAMMOGRAPHY | Facility: CLINIC | Age: 68
End: 2023-07-07
Payer: COMMERCIAL

## 2023-07-07 VITALS — BODY MASS INDEX: 27.32 KG/M2 | WEIGHT: 164 LBS | HEIGHT: 65 IN

## 2023-07-07 DIAGNOSIS — R92.8 ABNORMAL SCREENING MAMMOGRAM: ICD-10-CM

## 2023-07-07 PROCEDURE — 77065 DX MAMMO INCL CAD UNI: CPT

## 2023-07-07 PROCEDURE — 76642 ULTRASOUND BREAST LIMITED: CPT

## 2023-07-07 PROCEDURE — G0279 TOMOSYNTHESIS, MAMMO: HCPCS

## 2023-07-12 ENCOUNTER — SOCIAL WORK (OUTPATIENT)
Dept: BEHAVIORAL/MENTAL HEALTH CLINIC | Facility: CLINIC | Age: 68
End: 2023-07-12
Payer: COMMERCIAL

## 2023-07-12 DIAGNOSIS — F41.9 ANXIETY: Primary | ICD-10-CM

## 2023-07-12 PROCEDURE — 90834 PSYTX W PT 45 MINUTES: CPT | Performed by: SOCIAL WORKER

## 2023-07-12 NOTE — PSYCH
Behavioral Health Psychotherapy Progress Note    Psychotherapy Provided: Individual Psychotherapy     Encounter Diagnoses   Name Primary? • Anxiety Yes         Goals addressed in session: Goal 1     DATA: Felicia Vannpe today about her feelings re: the boundaries that she has maintained with re: to the conflict between her  and his sister. She also shared the relief that she felt re: her breast ultrasound results. During this session, this clinician used the following therapeutic modalities: Supportive Psychotherapy    Substance Abuse was not addressed during this session. If the client is diagnosed with a co-occurring substance use disorder, please indicate any changes in the frequency or amount of use: . Stage of change for addressing substance use diagnoses: No substance use/Not applicable    ASSESSMENT:  Sabina Campuzano presents with a Euthymic/ normal mood. She has consistently expressed the desire to lose weight and remains frustrated that this is so difficult for her.  her affect is Normal range and intensity, which is congruent, with her mood and the content of the session. The client has made progress on their goals. Sabina Campuzano presents with a minimal risk of suicide, minimal risk of self-harm, and none risk of harm to others. For any risk assessment that surpasses a "low" rating, a safety plan must be developed. A safety plan was indicated: no  If yes, describe in detail     PLAN: Between sessions, Sabina Campuzano will continue to engage in acts of self care. At the next session, the therapist will use Supportive Psychotherapy to address the above in further detail. Behavioral Health Treatment Plan and Discharge Planning: Sabina Campuzano is aware of and agrees to continue to work on their treatment plan. They have identified and are working toward their discharge goals.  yes    Visit start and stop times:    06/14/23    Start Time: 1600  Stop Time: 1462  Total Visit Time: 48 minutes

## 2023-07-20 ENCOUNTER — OFFICE VISIT (OUTPATIENT)
Dept: PHYSICAL THERAPY | Facility: REHABILITATION | Age: 68
End: 2023-07-20
Payer: COMMERCIAL

## 2023-07-20 DIAGNOSIS — M79.18 PAIN IN RIGHT BUTTOCK: Primary | ICD-10-CM

## 2023-07-20 PROCEDURE — 97162 PT EVAL MOD COMPLEX 30 MIN: CPT | Performed by: PHYSICAL THERAPIST

## 2023-07-20 PROCEDURE — 97112 NEUROMUSCULAR REEDUCATION: CPT | Performed by: PHYSICAL THERAPIST

## 2023-07-20 NOTE — PROGRESS NOTES
PT Evaluation     Today's date: 2023  Patient name: Hansel Pardo  : 1955  MRN: [id number]  Referring provider: Gena Gamble MD  Dx:   Encounter Diagnosis     ICD-10-CM    1. Pain in right buttock  M79.18                      Assessment  Assessment details: Hansel Pardo is a pleasant 80 y.o. female who presents with acute right buttock and lower extremity pain. She reports that about one month ago, she was in [place], walked about 5 miles, and the next day, she started to have significant buttock and leg pain, with numbness/tingling sensations. She did go to Urgent Care 1 week later, and did have a 5 day steroid pack, which improved symptoms marginally, but continues to have similar sensations of pain. 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 Upon her movement exam, her lumbar spine was cleared for reproduction of peripheral symptoms. She had an antalgic gait with a right lateral trunk lean and complaints of buttock pain. She had a positive slump test on her right side, with pain also reproduced with a passive SLR at end range, palpation to the right gluteal region, and active hip abduction of the right hip. Her symptoms do currently behave like a deep gluteal syndrome with sciatic nerve involvement. The patient responded well to initial interventions today working on initial motor control interventions for her gluteal muscles and neural mobility exercises. She was educated today on current clinical findings, and a plan for formal physical therapy, which she agreed to proceed with. No further referral is warranted at this time based upon examination results. I do expect she will improve over the next 6-8 weeks with physical therapy. Positive prognostic indicators include positive attitude towards recovery and good understanding of diagnosis.  Negative prognostic indicators include standing intensive occupation and reliance on medications. Problem List:  1) Abnormal gait secondary to right gluteal region inhibition/weakness  2) Standing/weight bearing intolerance    Comparable signs:  1) Prolonged sitting  2) Prolonged walking  Impairments: abnormal coordination, abnormal gait, abnormal muscle firing, abnormal muscle tone, abnormal or restricted ROM, abnormal movement, activity intolerance, impaired balance, impaired physical strength, lacks appropriate home exercise program, pain with function, weight-bearing intolerance, poor posture  and poor body mechanics    Symptom irritability: moderateUnderstanding of Dx/Px/POC: good   Prognosis: good    Goals  Impairment Based Goals:   Patient will improve FOTO score greater than predicted increase by discharge. Patient will have at least a 50% decrease in pain in 4 weeks. Patient will improve hip strength by at least 1/2 MMT grade in 6 weeks. Functional Based Goals: To be met upon discharge  Patient will be independent with home exercise program.   Patient will be able to manage symptoms independently. Patient will be independent with all work related activities. Patient will be able to return to exercising at the gym 3x/week.     Plan  Patient would benefit from: skilled physical therapy  Referral necessary: No  Planned therapy interventions: abdominal trunk stabilization, activity modification, balance, balance/weight bearing training, body mechanics training, patient education, postural training, neuromuscular re-education, graded exercise, graded activity, functional ROM exercises, flexibility, coordination, strengthening, therapeutic activities, stretching, therapeutic exercise, manual therapy, motor coordination training, gait training and breathing training  Frequency: 2x week  Plan of Care beginning date: 7/20/2023  Plan of Care expiration date: 9/14/2023  Treatment plan discussed with: patient        Subjective Evaluation    History of Present Illness  Mechanism of injury: About a month and a half ago, I went to [facility] for the day, walked about 5 miles. Woke up the next day with a pain in the right buttock. Managed it with ice, advil, and stretching. About one week later, it was not getting better, and went to urgent care. At that point, X-Rays were taken, and there was no fracture. I was placed on steroids for 5 days, which did midly improve my symptoms. I currently ice 2-3x/day and try to stretch, but I have not noticed a consistent improvement. Past Medical History: Right hip bursitis, bilateral carpal tunnel    Constant or Intermittent Symptoms: Constant     Employment: Nurse 1x/week, and students 3x/week     Hobbies: Gym 3x/week, walking on the treadmill          Recurrent probem    Patient Goals  Patient goals for therapy: decreased pain, improved balance, increased motion, increased strength, independence with ADLs/IADLs and return to sport/leisure activities    Pain  Current pain ratin  At best pain ratin  At worst pain ratin  Location: RIght buttock, and down the right leg   Quality: dull ache and burning (numbness)  Relieving factors: ice, change in position and medications  Aggravating factors: sitting, standing, walking, stair climbing and lifting (trying to find a comfortable position at night )    Treatments  Previous treatment: medication  Current treatment: physical therapy        Objective     General Comments:      Lumbar Comments  Lumbar Spine AROM:   Flexion: WNL   Extension: Moderate loss   Sideglide: R  WNL  L min loss with pain    Gait: Reproduction of right buttock pain; mild right lateral trunk lean     Slump: Positive right     LQS: Unremarkable     Palpation: (+) Right gluteal region of right hip     Passive SLR: (+) R     Strength:   Hip Flexion: R 4/5 p! L 5/5   Hip Abduction: R 3+/5 p! L 4/5   Hip Extension Knee Extended: R 3+/5 p! L 4-/5   Hip Extension Knee Flexed: R 3+/5 p!   L 4-/5     Hip Mobility Passive: WNL bilaterally; pain reproduced with end range hip flexion and internal rotation on right side     Red Flags: Unremarkable     Squat: Poor movement coordination, early heel rise, knee valgus, poor lumbopelvic control     Lumbar Spine Joint Play: WNL; no reproduction of right hip pain                    Precautions: N/A      Manuals 7/20            R Gluteal STM                                                    Neuro Re-Ed             Pt Ed 15'            Sidelying Clamshell HEP (review nv)            Supine Hip Abduction Isometric HEP (review nv)            SL Isometric against wall             Standing Hip 3 Way - [place]             Hip Hike             SL Hip Abduction              Ther Ex             NuStep             Dynamic 90-90 Hip Switch             Quadruped Hip Rock Back (Foot crossed behind)             Modified Side Plank             Bridge - [place]             Hip Thrust - [place]             Suitcase Carry - [place]                          Ther Activity             Deadlift - Kettlebell             Sit to Stand - [place], [facility]             Gait Training             Sidestepping - [place], [place]                          Modalities

## 2023-07-24 ENCOUNTER — HOSPITAL ENCOUNTER (OUTPATIENT)
Dept: RADIOLOGY | Age: 68
Discharge: HOME | End: 2023-07-24
Payer: COMMERCIAL

## 2023-07-24 DIAGNOSIS — N95.1 POSTMENOPAUSAL DISORDER: ICD-10-CM

## 2023-07-24 DIAGNOSIS — Z13.820 OSTEOPOROSIS SCREENING: ICD-10-CM

## 2023-07-24 PROCEDURE — 77080 DXA BONE DENSITY AXIAL: CPT

## 2023-07-25 ENCOUNTER — OFFICE VISIT (OUTPATIENT)
Dept: PHYSICAL THERAPY | Facility: REHABILITATION | Age: 68
End: 2023-07-25
Payer: COMMERCIAL

## 2023-07-25 DIAGNOSIS — M79.18 PAIN IN RIGHT BUTTOCK: Primary | ICD-10-CM

## 2023-07-25 PROCEDURE — 97140 MANUAL THERAPY 1/> REGIONS: CPT | Performed by: PHYSICAL THERAPIST

## 2023-07-25 PROCEDURE — 97110 THERAPEUTIC EXERCISES: CPT | Performed by: PHYSICAL THERAPIST

## 2023-07-25 PROCEDURE — 97112 NEUROMUSCULAR REEDUCATION: CPT | Performed by: PHYSICAL THERAPIST

## 2023-07-25 NOTE — PROGRESS NOTES
Daily Note     Today's date: 2023  Patient name: Kailey Forde  : 1955  MRN: [id number]  Referring provider: Gee Mariano MD  Dx:   Encounter Diagnosis     ICD-10-CM    1. Pain in right buttock  M79.18                      Subjective: Feeling sore today, but had about 15,000 steps at work yesterday so was on my feet a lot. Objective: See treatment diary below      Assessment: Tolerated treatment well. Much more challenged with activation of hip internal rotators. Did also have reproduction of symptoms with palpation to gluteal region, but relief with active release and posterior hip mobilization in supine. Able to reinforce motor control interventions today with good response. Patient would benefit from continued PT      Plan: Continue per plan of care.       Precautions: N/A      Manuals            R Gluteal STM  SE           R Hip Post Mob  SE Gr III            R Hip IR/ER MREs  SE                         Neuro Re-Ed             Pt Ed 15'            Sidelying Clamshell HEP (review nv)            Supine Hip Abduction Isometric HEP (review nv)            SL Isometric against wall             Standing Hip 3 Way - [place]             Hip Hike             SL Hip Abduction   PT assist with ecc lower 2x5 B           Ther Ex             NuStep             Dynamic 90-90 Hip Switch             Quadruped Hip Rock Back (Foot crossed behind)  2x10           Modified Side Plank             Bridge - [place]  bridge 3x8            Hip Thrust - [place]             Suitcase Carry - [place]                          Ther Activity             Deadlift - Kettlebell             Sit to Stand - [place], [place]             Gait Training             Sidestepping - [place], [place]                          Modalities

## 2023-07-28 ENCOUNTER — OFFICE VISIT (OUTPATIENT)
Dept: PHYSICAL THERAPY | Facility: REHABILITATION | Age: 68
End: 2023-07-28
Payer: COMMERCIAL

## 2023-07-28 DIAGNOSIS — M79.18 PAIN IN RIGHT BUTTOCK: Primary | ICD-10-CM

## 2023-07-28 PROCEDURE — 97110 THERAPEUTIC EXERCISES: CPT | Performed by: PHYSICAL THERAPIST

## 2023-07-28 PROCEDURE — 97140 MANUAL THERAPY 1/> REGIONS: CPT | Performed by: PHYSICAL THERAPIST

## 2023-07-28 NOTE — PROGRESS NOTES
Daily Note     Today's date: 2023  Patient name: Sheldon Cid  : 1955  MRN: [id number]  Referring provider: Rita White MD  Dx:   Encounter Diagnosis     ICD-10-CM    1. Pain in right buttock  M79.18                      Subjective: My hip is very sore today and has been since after work last night. Tried to ice and stretch but couldn't get relief. Objective: See treatment diary below      Assessment: Rosy Lo had a fair response to today's treatment, but presented with high irritability limiting her from tolerating most interventions, even being at a low level of intensity. The focus was on passive treatments, including manual therapy, patient education, and diaphragmatic breathing, as patient was having trouble relaxing her right hip due to her symptoms. Pain was located in similar place in the deep buttock region. Responded best to diaghragmatic breathing followed by repeated flexion in lying with clinician overpressure. Does have a significant antalgic gait pattern with ambulation with poor acceptance of right lower extremity in mid stance. Educated to monitor symptoms over the weekend and will reassess nv. Plan: Continue per plan of care.       Precautions: N/A      Manuals           R Gluteal STM  SE SE          R Hip Post Mob  SE Gr III            R Hip PROM   SE          R Hip IR/ER MREs  SE            R Hip LAD   SE          Neuro Re-Ed             Pt Ed 15'            Sidelying Clamshell HEP (review nv)            Supine Hip Abduction Isometric HEP (review nv)            SL Isometric against wall             Standing Hip 3 Way - [place]             Hip Hike             SL Hip Abduction   PT assist with ecc lower 2x5 B np           Ther Ex             NuStep             Dynamic 90-90 Hip Switch             Quadruped Hip Rock Back (Foot crossed behind)  2x10 np          Modified Side Plank             Bridge - [place]  bridge 3x8  np          Hip Thrust - [place] Diaphragmatic Breathing   5'           Supine [facility]   2x15          Ther Activity             Deadlift - Kettlebell             Sit to Stand - [place], [place]             Gait Training             Sidestepping - Resisted, [place]                          Modalities

## 2023-08-01 ENCOUNTER — OFFICE VISIT (OUTPATIENT)
Dept: PHYSICAL THERAPY | Facility: REHABILITATION | Age: 68
End: 2023-08-01
Payer: COMMERCIAL

## 2023-08-01 DIAGNOSIS — M79.18 PAIN IN RIGHT BUTTOCK: Primary | ICD-10-CM

## 2023-08-01 PROCEDURE — 97112 NEUROMUSCULAR REEDUCATION: CPT | Performed by: PHYSICAL THERAPIST

## 2023-08-01 PROCEDURE — 97110 THERAPEUTIC EXERCISES: CPT | Performed by: PHYSICAL THERAPIST

## 2023-08-01 PROCEDURE — 97140 MANUAL THERAPY 1/> REGIONS: CPT | Performed by: PHYSICAL THERAPIST

## 2023-08-01 NOTE — PROGRESS NOTES
Daily Note     Today's date: 2023  Patient name: Cynthia Heard  : 1955  MRN: [id number]  Referring provider: Alyse Nova MD  Dx:   Encounter Diagnosis     ICD-10-CM    1. Pain in right buttock  M79.18                      Subjective: Feeling better than how I was last session. I rested a lot over the weekend which I think did help. Objective: See treatment diary below      Assessment: Tolerated treatment well. Was able to reintroduce exercises today as patient was less irritable than previous session. Educated to monitor symptoms until follow up at end of week to determine appropriate dosage of volume and intensity. Patient would benefit from continued PT      Plan: Continue per plan of care.       Precautions: N/A      Manuals          R Gluteal STM  SE SE          R Hip Post Mob  SE Gr III            R Hip PROM   SE SE         R Hip IR/ER MREs  SE            R Hip LAD   SE SE         Neuro Re-Ed             Pt Ed 15'            Sidelying Clamshell HEP (review nv)            Supine Hip Abduction Isometric HEP (review nv)            SL Total Gym Leg Press    1x8 B         Standing Hip 3 Way - [place]             Hip Hike             SL Hip Abduction   PT assist with ecc lower 2x5 B np  PT assist 2x5 B         Ther Ex             NuStep             Dynamic 90-90 Hip Switch             Quadruped Hip Rock Back (Foot crossed behind)  2x10 np          Modified Side Plank             Bridge - [place]  bridge 3x8  np          Hip Thrust - [place]             Diaphragmatic Breathing   5'           ASLR    2x5 B         Supine DKTC   2x15 2x15         Ther Activity             Deadlift - Kettlebell             Sit to Stand - [place], [place]             Gait Training             Sidestepping - [place], [place]                          Modalities

## 2023-08-03 ENCOUNTER — OFFICE VISIT (OUTPATIENT)
Dept: INTERNAL MEDICINE CLINIC | Facility: CLINIC | Age: 68
End: 2023-08-03
Payer: COMMERCIAL

## 2023-08-03 VITALS
DIASTOLIC BLOOD PRESSURE: 56 MMHG | SYSTOLIC BLOOD PRESSURE: 107 MMHG | WEIGHT: 169 LBS | BODY MASS INDEX: 28.85 KG/M2 | HEART RATE: 71 BPM | TEMPERATURE: 98.2 F | HEIGHT: 64 IN | OXYGEN SATURATION: 98 %

## 2023-08-03 DIAGNOSIS — M54.41 ACUTE RIGHT-SIDED LOW BACK PAIN WITH RIGHT-SIDED SCIATICA: Primary | ICD-10-CM

## 2023-08-03 DIAGNOSIS — E78.00 PURE HYPERCHOLESTEROLEMIA: ICD-10-CM

## 2023-08-03 DIAGNOSIS — K21.00 GASTROESOPHAGEAL REFLUX DISEASE WITH ESOPHAGITIS WITHOUT HEMORRHAGE: ICD-10-CM

## 2023-08-03 PROCEDURE — 99214 OFFICE O/P EST MOD 30 MIN: CPT | Performed by: INTERNAL MEDICINE

## 2023-08-03 RX ORDER — GABAPENTIN 100 MG/1
100 CAPSULE ORAL 3 TIMES DAILY
Qty: 90 CAPSULE | Refills: 0 | Status: SHIPPED | OUTPATIENT
Start: 2023-08-03 | End: 2023-09-02

## 2023-08-03 RX ORDER — CELECOXIB 200 MG/1
200 CAPSULE ORAL 2 TIMES DAILY
Qty: 60 CAPSULE | Refills: 0 | Status: SHIPPED | OUTPATIENT
Start: 2023-08-03

## 2023-08-03 RX ORDER — FAMOTIDINE 20 MG/1
20 TABLET, FILM COATED ORAL 2 TIMES DAILY
Qty: 60 TABLET | Refills: 2 | Status: SHIPPED | OUTPATIENT
Start: 2023-08-03

## 2023-08-03 NOTE — PROGRESS NOTES
Assessment/Plan:           1. Acute right-sided low back pain with right-sided sciatica  Comments:  Celebrex and continuation of physical therapy advised. Orders:  -     celecoxib (CeleBREX) 200 mg capsule; Take 1 capsule (200 mg total) by mouth 2 (two) times a day  -     gabapentin (Neurontin) 100 mg capsule; Take 1 capsule (100 mg total) by mouth 3 (three) times a day    2. Gastroesophageal reflux disease with esophagitis without hemorrhage  Comments:  Continue known ulcerogenic diet and famotidine. Orders:  -     famotidine (PEPCID) 20 mg tablet; Take 1 tablet (20 mg total) by mouth 2 (two) times a day    3. Pure hypercholesterolemia  Comments:  Patient had consultation with cardiology and is on rosuvastatin 20 mg daily. 1. Acute right-sided low back pain with right-sided sciatica    - celecoxib (CeleBREX) 200 mg capsule; Take 1 capsule (200 mg total) by mouth 2 (two) times a day  Dispense: 60 capsule; Refill: 0  - gabapentin (Neurontin) 100 mg capsule; Take 1 capsule (100 mg total) by mouth 3 (three) times a day  Dispense: 90 capsule; Refill: 0    2. Gastroesophageal reflux disease with esophagitis without hemorrhage    - famotidine (PEPCID) 20 mg tablet; Take 1 tablet (20 mg total) by mouth 2 (two) times a day  Dispense: 60 tablet; Refill: 2       No problem-specific Assessment & Plan notes found for this encounter. Subjective:      Patient ID: Sheldon Cid is a 80 y.o. female. HPI    The following portions of the patient's history were reviewed and updated as appropriate: She  has a past medical history of Anxiety, Body mass index 27.0-27.9, adult, Colon cancer screening, Colon polyp, Depression, GERD (gastroesophageal reflux disease), GERD (gastroesophageal reflux disease), Hyperlipidemia, and Viral intestinal infection.   She   Patient Active Problem List    Diagnosis Date Noted   • Chronic idiopathic constipation 02/11/2022   • History of colon polyps 02/11/2022   • Hiatal hernia 09/17/2021   • Pure hypercholesterolemia 08/17/2021   • GERD (gastroesophageal reflux disease)    • Anxiety 13/25/4641   • Lichen sclerosus 99/02/3046     She  has a past surgical history that includes Cholecystectomy (08/2011); Carpal tunnel release (Bilateral); Tubal ligation; Colonoscopy; Cystoscopy; pr colpopexy vaginal extraperitoneal approach (N/A, 5/23/2016); pr cmbnd anterpost colporraphy w/cysto (N/A, 5/23/2016); pr sling operation stress incontinence (N/A, 5/23/2016); pr cystourethroscopy (N/A, 5/23/2016); pr colonoscopy flx dx w/collj spec when pfrmd (N/A, 10/17/2018); and Colonoscopy (N/A, 10/18/2018). Her family history includes COPD in her mother; Coronary artery disease in her father; Endometrial cancer (age of onset: 76) in her mother; Heart disease in her father; No Known Problems in her brother, daughter, maternal aunt, maternal aunt, maternal aunt, maternal aunt, maternal grandfather, maternal grandmother, paternal aunt, paternal grandfather, paternal grandmother, sister, sister, and son. She  reports that she has never smoked. She has never used smokeless tobacco. She reports current alcohol use of about 1.0 standard drink of alcohol per week. She reports that she does not use drugs.   Current Outpatient Medications   Medication Sig Dispense Refill   • aspirin (ECOTRIN LOW STRENGTH) 81 mg EC tablet Take 1 tablet (81 mg total) by mouth daily 30 tablet 0   • bisacodyl (DULCOLAX) 5 mg EC tablet Take 1 tablet (5 mg total) by mouth daily as needed for constipation 2 tablet 0   • Calcium Carbonate-Vitamin D (CALCIUM 500/D PO) Take by mouth     • celecoxib (CeleBREX) 200 mg capsule Take 1 capsule (200 mg total) by mouth 2 (two) times a day 60 capsule 0   • Cholecalciferol (VITAMIN D) 2000 units CAPS Take by mouth     • docusate sodium (COLACE) 100 mg capsule Take 100 mg by mouth in the morning     • estradiol (ESTRACE) 0.1 mg/g vaginal cream Insert 1 g into vagina twice a week (Patient taking differently: if needed) 42.5 g 3   • famotidine (PEPCID) 20 mg tablet Take 1 tablet (20 mg total) by mouth 2 (two) times a day 60 tablet 2   • gabapentin (Neurontin) 100 mg capsule Take 1 capsule (100 mg total) by mouth 3 (three) times a day 90 capsule 0   • methocarbamol (ROBAXIN) 500 mg tablet Take 1 tablet (500 mg total) by mouth 3 (three) times a day as needed for muscle spasms 21 tablet 0   • Multiple Vitamins-Minerals (MULTIVITAL-M PO) Take by mouth     • rosuvastatin (CRESTOR) 20 MG tablet Take 1 tablet (20 mg total) by mouth daily 90 tablet 3   • sertraline (ZOLOFT) 50 mg tablet Take 1 tablet (50 mg total) by mouth daily 90 tablet 3     No current facility-administered medications for this visit. Current Outpatient Medications on File Prior to Visit   Medication Sig   • aspirin (ECOTRIN LOW STRENGTH) 81 mg EC tablet Take 1 tablet (81 mg total) by mouth daily   • bisacodyl (DULCOLAX) 5 mg EC tablet Take 1 tablet (5 mg total) by mouth daily as needed for constipation   • Calcium Carbonate-Vitamin D (CALCIUM 500/D PO) Take by mouth   • Cholecalciferol (VITAMIN D) 2000 units CAPS Take by mouth   • docusate sodium (COLACE) 100 mg capsule Take 100 mg by mouth in the morning   • estradiol (ESTRACE) 0.1 mg/g vaginal cream Insert 1 g into vagina twice a week (Patient taking differently: if needed)   • methocarbamol (ROBAXIN) 500 mg tablet Take 1 tablet (500 mg total) by mouth 3 (three) times a day as needed for muscle spasms   • Multiple Vitamins-Minerals (MULTIVITAL-M PO) Take by mouth   • rosuvastatin (CRESTOR) 20 MG tablet Take 1 tablet (20 mg total) by mouth daily   • sertraline (ZOLOFT) 50 mg tablet Take 1 tablet (50 mg total) by mouth daily   • [DISCONTINUED] famotidine (PEPCID) 20 mg tablet Take 1 tablet (20 mg total) by mouth 2 (two) times a day     No current facility-administered medications on file prior to visit.      Medications Discontinued During This Encounter   Medication Reason   • famotidine (PEPCID) 20 mg tablet Reorder      She is allergic to levaquin [levofloxacin] and quinolones. .    Review of Systems   Constitutional: Negative for appetite change, chills, fatigue and fever. HENT: Negative for sore throat and trouble swallowing. Eyes: Negative for redness. Respiratory: Negative for shortness of breath. Cardiovascular: Negative for chest pain and palpitations. Gastrointestinal: Negative for abdominal pain, constipation and diarrhea. Genitourinary: Negative for dysuria and hematuria. Musculoskeletal: Positive for back pain. Negative for neck pain. Skin: Negative for rash. Neurological: Negative for seizures, weakness and headaches. Hematological: Negative for adenopathy. Psychiatric/Behavioral: Negative for confusion. The patient is not nervous/anxious. Objective:      /56 (BP Location: Left arm, Patient Position: Sitting, Cuff Size: Large)   Pulse 71   Temp 98.2 °F (36.8 °C) (Temporal)   Ht 5' 4" (1.626 m)   Wt 76.7 kg (169 lb)   SpO2 98%   BMI 29.01 kg/m²     Results Reviewed     None          No results found for this or any previous visit (from the past 1344 hour(s)). Physical Exam  Constitutional:       General: She is not in acute distress. Appearance: Normal appearance. HENT:      Head: Normocephalic and atraumatic. Nose: Nose normal.      Mouth/Throat:      Mouth: Mucous membranes are moist.   Eyes:      Extraocular Movements: Extraocular movements intact. Pupils: Pupils are equal, round, and reactive to light. Cardiovascular:      Rate and Rhythm: Normal rate and regular rhythm. Pulses: Normal pulses. Heart sounds: Normal heart sounds. No murmur heard. No friction rub. Pulmonary:      Effort: Pulmonary effort is normal. No respiratory distress. Breath sounds: Normal breath sounds. No wheezing. Abdominal:      General: Abdomen is flat. Bowel sounds are normal. There is no distension.       Palpations: Abdomen is soft. There is no mass. Tenderness: There is no abdominal tenderness. There is no guarding. Musculoskeletal:         General: Tenderness present. Normal range of motion. Cervical back: Normal range of motion. Skin:     General: Skin is warm. Neurological:      General: No focal deficit present. Mental Status: She is alert and oriented to person, place, and time. Mental status is at baseline. Cranial Nerves: No cranial nerve deficit.    Psychiatric:         Mood and Affect: Mood normal.         Behavior: Behavior normal.

## 2023-08-04 ENCOUNTER — OFFICE VISIT (OUTPATIENT)
Dept: PHYSICAL THERAPY | Facility: REHABILITATION | Age: 68
End: 2023-08-04
Payer: COMMERCIAL

## 2023-08-04 DIAGNOSIS — M79.18 PAIN IN RIGHT BUTTOCK: Primary | ICD-10-CM

## 2023-08-04 PROCEDURE — 97112 NEUROMUSCULAR REEDUCATION: CPT | Performed by: PHYSICAL THERAPIST

## 2023-08-04 PROCEDURE — 97110 THERAPEUTIC EXERCISES: CPT | Performed by: PHYSICAL THERAPIST

## 2023-08-04 NOTE — PROGRESS NOTES
Daily Note     Today's date: 2023  Patient name: Rick Chance  : 1955  MRN: [id number]  Referring provider: Demetria Benitez MD  Dx:   Encounter Diagnosis     ICD-10-CM    1. Pain in right buttock  M79.18                      Subjective: I was prescribed gabapentin and Celebrex by my physician during my follow up. It has helped a little. Objective: See treatment diary below      Assessment: Tolerated treatment fair. Continues to have high irritability in weight bearing positions, requiring frequent rest breaks. Had to modify interventions today to modulate patient's symptoms accordingly. Patient would benefit from continued PT      Plan: Continue per plan of care.       Precautions: N/A      Manuals     R Gluteal STM      R Hip Post Mob      R Hip PROM SE SE    R Hip IR/ER MREs      R Hip LAD SE SE    Neuro Re-Ed      Lateral Step Up  2x8 B    Sidelying Clamshell      Supine Hip Abduction Isometric      SL Total Gym Leg Press 1x8 B 1x8 B    Standing Hip 3 Way - [place]      Hip Hike      SL Hip Abduction  PT assist 2x5 B PT assist 2x5 B    Ther Ex      NuStep      Dynamic 90-90 Hip Switch      Quadruped Hip Rock Back (Foot crossed behind)      Modified Side Plank      Bridge - [place]      Hip Thrust - [place]      Diaphragmatic Breathing      ASLR 2x5 B 2x5 B    Supine DKTC 2x15 2x15    Ther Activity      Deadlift - Kettlebell      Sit to Stand - Kettlebell, Tempo      Gait Training      Sidestepping - Resisted, [place]            Modalities

## 2023-08-09 ENCOUNTER — OFFICE VISIT (OUTPATIENT)
Dept: PHYSICAL THERAPY | Facility: REHABILITATION | Age: 68
End: 2023-08-09
Payer: COMMERCIAL

## 2023-08-09 ENCOUNTER — TELEPHONE (OUTPATIENT)
Dept: OBGYN CLINIC | Facility: CLINIC | Age: 68
End: 2023-08-09

## 2023-08-09 DIAGNOSIS — M79.18 PAIN IN RIGHT BUTTOCK: Primary | ICD-10-CM

## 2023-08-09 PROCEDURE — 97112 NEUROMUSCULAR REEDUCATION: CPT | Performed by: PHYSICAL THERAPIST

## 2023-08-09 PROCEDURE — 97140 MANUAL THERAPY 1/> REGIONS: CPT | Performed by: PHYSICAL THERAPIST

## 2023-08-09 PROCEDURE — 97110 THERAPEUTIC EXERCISES: CPT | Performed by: PHYSICAL THERAPIST

## 2023-08-09 NOTE — TELEPHONE ENCOUNTER
Returned pt's p.c. pt is concerned regarding her recent DEXA results. Pt has been consistant in taking her calcium with Vit D. Wants to know next step. Would like a return call from Dr Mendy Cantu, ordering physician.   Routed to CT

## 2023-08-09 NOTE — PROGRESS NOTES
Daily Note     Today's date: 2023  Patient name: Farrah Palomino  : 1955  MRN: [id number]  Referring provider: Jhonatan Abdul MD  Dx:   Encounter Diagnosis     ICD-10-CM    1. Pain in right buttock  M79.18                      Subjective: Pt reports she continues to experience pain radiating from R lumbar spine down entire length of R LE to her foot. Pt feels the gabapentin might be helping some. Pt notes relief with sitting. Objective: See treatment diary below      Assessment: Tolerated treatment well. No symptom exacerbation with exercises or manuals this session. Patient would benefit from continued PT      Plan: Continue per plan of care.       Precautions: N/A      Manuals    R Gluteal STM      R Hip Post Mob      R Hip PROM SE SE TP 6'   R Hip IR/ER MREs      R Hip LAD SE SE TP 4'   Neuro Re-Ed      Lateral Step Up  2x8 B 2x8 B 6"   Sidelying Clamshell      Supine Hip Abduction Isometric      SL Total Gym Leg Press 1x8 B 1x8 B 1x8 B L6   Standing Hip 3 Way - [place]      Hip Hike      SL Hip Abduction  PT assist 2x5 B PT assist 2x5 B PT assist 2x5 B   Ther Ex      NuStep   L5x10'   Dynamic 90-90 Hip Switch      Quadruped Hip Rock Back (Foot crossed behind)      Modified Side Plank      Bridge - [place]      Hip Thrust - [place]      Diaphragmatic Breathing      ASLR 2x5 B 2x5 B 2x5 B   Supine DKTC 2x15 2x15 2x10   Seated lumbar flexion w/pball   10"x10   Ther Activity      Deadlift - Kettlebell      Sit to Stand - Kettlebell, Tempo      Gait Training      Sidestepping - Resisted, [place]            Modalities

## 2023-08-09 NOTE — TELEPHONE ENCOUNTER
----- Message from Colton Baca sent at 8/9/2023 12:02 PM EDT -----  Regarding: Recent DXA results   Contact: [phone]  Can you check my DXA scan results from 8/1. Seems like my levels have dropped- do I need more of a replacement than I’m already taking?

## 2023-08-11 ENCOUNTER — OFFICE VISIT (OUTPATIENT)
Dept: PHYSICAL THERAPY | Facility: REHABILITATION | Age: 68
End: 2023-08-11
Payer: COMMERCIAL

## 2023-08-11 DIAGNOSIS — M79.18 PAIN IN RIGHT BUTTOCK: Primary | ICD-10-CM

## 2023-08-11 PROCEDURE — 97110 THERAPEUTIC EXERCISES: CPT | Performed by: PHYSICAL THERAPIST

## 2023-08-11 PROCEDURE — 97140 MANUAL THERAPY 1/> REGIONS: CPT | Performed by: PHYSICAL THERAPIST

## 2023-08-11 PROCEDURE — 97112 NEUROMUSCULAR REEDUCATION: CPT | Performed by: PHYSICAL THERAPIST

## 2023-08-11 NOTE — PROGRESS NOTES
Daily Note     Today's date: 2023  Patient name: Marin Parish  : 1955  MRN: [id number]  Referring provider: Margi Dykes MD  Dx:   Encounter Diagnosis     ICD-10-CM    1. Pain in right buttock  M79.18                      Subjective: The hip has been feeling much better recently. I did get a massage yesterday which also helped. Objective: See treatment diary below      Assessment: Tolerated treatment well. Was much more tolerant to exercises today than previous sessions. Patient would benefit from continued PT      Plan: Continue per plan of care.       Precautions: N/A      Manuals    R Gluteal STM       R Hip Post Mob       R Hip PROM SE SE TP 6' SE 6'   R Hip IR/ER MREs       R Hip LAD SE SE TP 4' SE 4'    Neuro Re-Ed       Lateral Step Up  2x8 B 2x8 B 6" 2x8 B 6"   Sidelying Clamshell       Supine Hip Abduction Isometric       SL Total Gym Leg Press 1x8 B 1x8 B 1x8 B L6 1x8 B L6   Standing Hip 3 Way - [place]       Hip Hike       SL Hip Abduction  PT assist 2x5 B PT assist 2x5 B PT assist 2x5 B PT assist 2x5 B   Ther Ex       NuStep   L5x10' L5x10'    Dynamic 90-90 Hip Switch       Quadruped Hip Rock Back (Foot crossed behind)       Modified Side Plank       Bridge - March       Hip Thrust - [place]       Diaphragmatic Breathing       ASLR 2x5 B 2x5 B 2x5 B 2x5 B   Supine DKTC 2x15 2x15 2x10 2x10    Seated lumbar flexion w/pball   10"x10 10"x10   Ther Activity       Deadlift - Kettlebell       Sit to Stand - Kettlebell, Tempo       Gait Training       Sidestepping - Resisted, [place]              Modalities

## 2023-08-12 ENCOUNTER — NURSE TRIAGE (OUTPATIENT)
Dept: OTHER | Facility: OTHER | Age: 68
End: 2023-08-12

## 2023-08-12 ENCOUNTER — HOSPITAL ENCOUNTER (EMERGENCY)
Facility: HOSPITAL | Age: 68
Discharge: HOME | End: 2023-08-12
Attending: EMERGENCY MEDICINE | Admitting: EMERGENCY MEDICINE
Payer: COMMERCIAL

## 2023-08-12 VITALS
SYSTOLIC BLOOD PRESSURE: 140 MMHG | WEIGHT: 169 LBS | RESPIRATION RATE: 18 BRPM | DIASTOLIC BLOOD PRESSURE: 66 MMHG | OXYGEN SATURATION: 97 % | TEMPERATURE: 97.7 F | HEART RATE: 69 BPM | BODY MASS INDEX: 29.01 KG/M2

## 2023-08-12 DIAGNOSIS — R93.1 AGATSTON CORONARY ARTERY CALCIUM SCORE BETWEEN 100 AND 199: ICD-10-CM

## 2023-08-12 DIAGNOSIS — E78.5 HYPERLIPIDEMIA, UNSPECIFIED HYPERLIPIDEMIA TYPE: ICD-10-CM

## 2023-08-12 DIAGNOSIS — R06.09 DOE (DYSPNEA ON EXERTION): ICD-10-CM

## 2023-08-12 DIAGNOSIS — M54.31 SCIATICA OF RIGHT SIDE: Primary | ICD-10-CM

## 2023-08-12 PROCEDURE — 99284 EMERGENCY DEPT VISIT MOD MDM: CPT | Performed by: EMERGENCY MEDICINE

## 2023-08-12 PROCEDURE — 99283 EMERGENCY DEPT VISIT LOW MDM: CPT

## 2023-08-12 RX ORDER — LIDOCAINE 50 MG/G
1 PATCH TOPICAL EVERY 24 HOURS
Qty: 5 PATCH | Refills: 0 | Status: SHIPPED | OUTPATIENT
Start: 2023-08-12 | End: 2023-08-12 | Stop reason: SDUPTHER

## 2023-08-12 RX ORDER — METHYLPREDNISOLONE 4 MG/1
TABLET ORAL
Qty: 21 TABLET | Refills: 0 | Status: SHIPPED | OUTPATIENT
Start: 2023-08-12 | End: 2023-08-27 | Stop reason: ALTCHOICE

## 2023-08-12 RX ORDER — LIDOCAINE 50 MG/G
1 PATCH TOPICAL EVERY 24 HOURS
Qty: 5 PATCH | Refills: 0 | Status: SHIPPED | OUTPATIENT
Start: 2023-08-12 | End: 2023-08-17

## 2023-08-12 RX ORDER — METHYLPREDNISOLONE 4 MG/1
TABLET ORAL
Qty: 21 TABLET | Refills: 0 | Status: SHIPPED | OUTPATIENT
Start: 2023-08-12 | End: 2023-08-12 | Stop reason: SDUPTHER

## 2023-08-12 NOTE — TELEPHONE ENCOUNTER
Reason for Disposition  • Patient sounds very sick or weak to the triager    Answer Assessment - Initial Assessment Questions  1. LOCATION and RADIATION: "Where is the pain located?"       Right buttocks, thigh and down leg    2. QUALITY: "What does the pain feel like?"  (e.g., sharp, dull, aching, burning)      Aching all the time, with different positions changes can be sharp    3. SEVERITY: "How bad is the pain?" "What does it keep you from doing?"   (Scale 1-10; or mild, moderate, severe)    -  MILD (1-3): doesn't interfere with normal activities     -  MODERATE (4-7): interferes with normal activities (e.g., work or school) or awakens from sleep, limping     -  SEVERE (8-10): excruciating pain, unable to do any normal activities, unable to walk      9/10 with activity, at rest 6/10    4. ONSET: "When did the pain start?" "Does it come and go, or is it there all the time?"      Ongoing for 7 weeks, much worse when woke up this am    5. WORK OR EXERCISE: "Has there been any recent work or exercise that involved this part of the body?"       PT yesterday- felt good after. Did see a movie yesterday and sat for couple hours    6. CAUSE: "What do you think is causing the hip pain?"       Sciatica    7. AGGRAVATING FACTORS: "What makes the hip pain worse?" (e.g., walking, climbing stairs, running)      Walking    8.  OTHER SYMPTOMS: "Do you have any other symptoms?" (e.g., back pain, pain shooting down leg,  fever, rash)      Travels down right leg,    Protocols used: HIP PAIN-ADULT-

## 2023-08-12 NOTE — ED PROVIDER NOTES
History  Chief Complaint   Patient presents with   • Back Pain     Low back right side radiating down right leg; hx of sciatica     Ms. Aziza Fisher is a 69yo F w/ PMH of sciatica, GERD, HLD who presents to the ED for increased sciatic pain. Patient reports that on her current medicine regimen she has been having significantly less pain, and her PT is going well. She had a PT session yesterday and reports when she left her leg was feeling great. This morning she woke up with severely increased pain. Reports that she is having difficulty standing due to the pain making her leg give out. Patient denies falling, hitting her head, or LOC. Denies any urinary or stool incontinence, anticoagulant use, recent trauma or spinal procedures, or saddle anesthesia. Prior to Admission Medications   Prescriptions Last Dose Informant Patient Reported? Taking?    Calcium Carbonate-Vitamin D (CALCIUM 500/D PO)  Self Yes No   Sig: Take by mouth   Cholecalciferol (VITAMIN D) 2000 units CAPS  Self Yes No   Sig: Take by mouth   Multiple Vitamins-Minerals (MULTIVITAL-M PO)  Self Yes No   Sig: Take by mouth   aspirin (ECOTRIN LOW STRENGTH) 81 mg EC tablet  Self No No   Sig: Take 1 tablet (81 mg total) by mouth daily   bisacodyl (DULCOLAX) 5 mg EC tablet  Self No No   Sig: Take 1 tablet (5 mg total) by mouth daily as needed for constipation   celecoxib (CeleBREX) 200 mg capsule   No No   Sig: Take 1 capsule (200 mg total) by mouth 2 (two) times a day   docusate sodium (COLACE) 100 mg capsule  Self Yes No   Sig: Take 100 mg by mouth in the morning   estradiol (ESTRACE) 0.1 mg/g vaginal cream  Self No No   Sig: Insert 1 g into vagina twice a week   Patient taking differently: if needed   famotidine (PEPCID) 20 mg tablet   No No   Sig: Take 1 tablet (20 mg total) by mouth 2 (two) times a day   gabapentin (Neurontin) 100 mg capsule   No No   Sig: Take 1 capsule (100 mg total) by mouth 3 (three) times a day   methocarbamol (ROBAXIN) 500 mg tablet  Self No No   Sig: Take 1 tablet (500 mg total) by mouth 3 (three) times a day as needed for muscle spasms   rosuvastatin (CRESTOR) 20 MG tablet  Self No No   Sig: Take 1 tablet (20 mg total) by mouth daily   sertraline (ZOLOFT) 50 mg tablet  Self No No   Sig: Take 1 tablet (50 mg total) by mouth daily      Facility-Administered Medications: None       Past Medical History:   Diagnosis Date   • Anxiety    • Body mass index 27.0-27.9, adult    • Colon cancer screening    • Colon polyp    • Depression    • GERD (gastroesophageal reflux disease)     takes tums occasionaly   • GERD (gastroesophageal reflux disease)    • Hyperlipidemia    • Viral intestinal infection        Past Surgical History:   Procedure Laterality Date   • CARPAL TUNNEL RELEASE Bilateral    • CHOLECYSTECTOMY  08/2011   • COLONOSCOPY     • COLONOSCOPY N/A 10/18/2018    Procedure: COLONOSCOPY;  Surgeon: Trinidad Harvey MD;  Location: [facility]; Service: Gastroenterology   • CYSTOSCOPY     • UT CMBND ANTERPOST COLPORRAPHY W/CYSTO N/A 5/23/2016    Procedure: COLPORRHAPHY ANTERIOR POSTERIOR WITH GRAFT; ENTEROCELE REPAIR;  Surgeon: Dana Leos MD;  Location[facility];  Service: UroGynecology          • UT COLONOSCOPY FLX DX W/COLLJ SPEC WHEN PFRMD N/A 10/17/2018    Procedure: COLONOSCOPY;  Surgeon: Trinidad Harvey MD;  Location: [facility]; Service: Gastroenterology   • UT COLPOPEXY VAGINAL EXTRAPERITONEAL APPROACH N/A 5/23/2016    Procedure: COLPOPEXY VAGINAL EXTRAPERITONEAL (VEC);   Surgeon: Dana Leos MD;  [facility];  Service: UroGynecology          • UT CYSTOURETHROSCOPY N/A 5/23/2016    Procedure: Edward Moore;  Surgeon: Dana Leos MD;  [facility];  Service: UroGynecology          • UT SLING OPERATION STRESS INCONTINENCE N/A 5/23/2016    Procedure: INSERTION PUBOVAGINAL SLING ;  Surgeon: Dana Leos MD;  [facility];  Service: UroGynecology          • TUBAL LIGATION         Family History   Problem Relation Age of Onset   • Endometrial cancer Mother 76   • COPD Mother    • Coronary artery disease Father    • Heart disease Father    • No Known Problems Sister    • No Known Problems Sister    • No Known Problems Daughter    • No Known Problems Maternal Grandmother    • No Known Problems Maternal Grandfather    • No Known Problems Paternal Grandmother    • No Known Problems Paternal Grandfather    • No Known Problems Brother    • No Known Problems Maternal Aunt    • No Known Problems Maternal Aunt    • No Known Problems Maternal Aunt    • No Known Problems Maternal Aunt    • No Known Problems Paternal Aunt    • No Known Problems Son      I have reviewed and agree with the history as documented. E-Cigarette/Vaping   • E-Cigarette Use Never User      E-Cigarette/Vaping Substances   • Nicotine No    • THC No    • CBD No    • Flavoring No    • Other No    • Unknown No      Social History     Tobacco Use   • Smoking status: Never   • Smokeless tobacco: Never   Vaping Use   • Vaping Use: Never used   Substance Use Topics   • Alcohol use: Yes     Alcohol/week: 1.0 standard drink of alcohol     Types: 1 Cans of beer per week     Comment: weekly   • Drug use: No        Review of Systems   Constitutional: Negative for activity change, chills and fever. Eyes: Negative for pain and visual disturbance. Respiratory: Negative for chest tightness and shortness of breath. Cardiovascular: Negative for chest pain. Gastrointestinal: Negative for abdominal pain, constipation, diarrhea, nausea and vomiting. Genitourinary: Negative for dysuria and hematuria. Skin: Negative for rash and wound. Neurological: Negative for dizziness, syncope, light-headedness and headaches. Psychiatric/Behavioral: Negative.         Physical Exam  ED Triage Vitals [08/12/23 1148]   Temperature Pulse Respirations Blood Pressure SpO2   97.7 °F (36.5 °C) 69 18 140/66 97 %      Temp src Heart Rate Source Patient Position - Orthostatic VS BP Location FiO2 (%)   -- -- -- -- --      Pain Score       6             Orthostatic Vital Signs  Vitals:    08/12/23 1148   BP: 140/66   Pulse: 69       Physical Exam  Vitals and nursing note reviewed. Constitutional:       Appearance: Normal appearance. HENT:      Head: Normocephalic and atraumatic. Right Ear: External ear normal.      Left Ear: External ear normal.      Nose: Nose normal.      Mouth/Throat:      Mouth: Mucous membranes are moist.      Pharynx: Oropharynx is clear. Eyes:      Extraocular Movements: Extraocular movements intact. Conjunctiva/sclera: Conjunctivae normal.   Cardiovascular:      Rate and Rhythm: Normal rate. Pulmonary:      Effort: Pulmonary effort is normal.   Abdominal:      General: Abdomen is flat. Musculoskeletal:         General: Normal range of motion. Cervical back: Normal range of motion and neck supple. Comments: Strength is 4/5 b/l, ROM WNL, 2+ reflexes b/l. Straight leg raise positive. Numbness on the lateral aspect of the LLE calf and anterolateral thigh. Patient reports this is unchanged since her diagnosis of sciatica. Skin:     General: Skin is warm and dry. Neurological:      General: No focal deficit present. Mental Status: She is alert and oriented to person, place, and time. Psychiatric:         Mood and Affect: Mood normal.         Behavior: Behavior normal.         ED Medications  Medications - No data to display    Diagnostic Studies  Results Reviewed     None                 No orders to display         Procedures  Procedures      ED Course                                       Medical Decision Making  Ms. Jeanne Chang is a 71yo F w/ PMH of sciatica, GERD, HLD who presents to the ED for increased sciatic pain. Prescribed Medrol dose jordon, and lidoderm patches. Work note supplied. Return precautions given and patient expresses understanding and is comfortable with discharge.             Disposition  Final diagnoses: Sciatica of right side     Time reflects when diagnosis was documented in both MDM as applicable and the Disposition within this note     Time User Action Codes Description Comment    8/12/2023 12:30 PM Oliver Wilson Add [M54.31] Sciatica of right side     8/12/2023 12:37 PM Oliver Wilson Add [R06.09] JUDD (dyspnea on exertion)     8/12/2023 12:37 PM Oliver Wilson Add [R93.1] Agatston coronary artery calcium score between 100 and 199     8/12/2023 12:37 PM Oliver Wilson Add [E78.5] Hyperlipidemia, unspecified hyperlipidemia type       ED Disposition     ED Disposition   Discharge    Condition   Stable    Date/Time   Sat Aug 12, 2023 12:30 PM    Comment   Ivelisse Monik discharge to home/self care. Follow-up Information     Follow up With Specialties Details Why Contact Info Additional [address] [facility] Emergency Department Emergency Medicine  Please return to the ED if you have any bowel or bladder control issues, if the numbness increases, or if you develop a fever. [phone]  [facility] Emergency Department, [place], [place], [address], MD Internal Medicine  Follow up with your primary care for further care/workup.  David  [address]  [phone]             Current Discharge Medication List      START taking these medications    Details   lidocaine (LIDODERM) 5 % Apply 1 patch topically over 12 hours every 24 hours for 5 days Remove & Discard patch within 12 hours or as directed by MD  Qty: 5 patch, Refills: 0    Associated Diagnoses: Sciatica of right side      methylPREDNISolone 4 MG tablet therapy pack Use as directed on package  Qty: 21 tablet, Refills: 0    Associated Diagnoses: Sciatica of right side         CONTINUE these medications which have NOT CHANGED    Details   aspirin (ECOTRIN LOW STRENGTH) 81 mg EC tablet Take 1 tablet (81 mg total) by mouth daily  Qty: 30 tablet, Refills: 0    Associated Diagnoses: JUDD (dyspnea on exertion);  Agatston coronary artery calcium score between 100 and 199; Hyperlipidemia, unspecified hyperlipidemia type      bisacodyl (DULCOLAX) 5 mg EC tablet Take 1 tablet (5 mg total) by mouth daily as needed for constipation  Qty: 2 tablet, Refills: 0    Associated Diagnoses: Screening for colon cancer      Calcium Carbonate-Vitamin D (CALCIUM 500/D PO) Take by mouth      celecoxib (CeleBREX) 200 mg capsule Take 1 capsule (200 mg total) by mouth 2 (two) times a day  Qty: 60 capsule, Refills: 0    Associated Diagnoses: Acute right-sided low back pain with right-sided sciatica      Cholecalciferol (VITAMIN D) 2000 units CAPS Take by mouth      docusate sodium (COLACE) 100 mg capsule Take 100 mg by mouth in the morning      estradiol (ESTRACE) 0.1 mg/g vaginal cream Insert 1 g into vagina twice a week  Qty: 42.5 g, Refills: 3    Associated Diagnoses: Atrophic vaginitis      famotidine (PEPCID) 20 mg tablet Take 1 tablet (20 mg total) by mouth 2 (two) times a day  Qty: 60 tablet, Refills: 2    Associated Diagnoses: Gastroesophageal reflux disease with esophagitis without hemorrhage      gabapentin (Neurontin) 100 mg capsule Take 1 capsule (100 mg total) by mouth 3 (three) times a day  Qty: 90 capsule, Refills: 0    Associated Diagnoses: Acute right-sided low back pain with right-sided sciatica      methocarbamol (ROBAXIN) 500 mg tablet Take 1 tablet (500 mg total) by mouth 3 (three) times a day as needed for muscle spasms  Qty: 21 tablet, Refills: 0    Associated Diagnoses: Right hip pain      Multiple Vitamins-Minerals (MULTIVITAL-M PO) Take by mouth      rosuvastatin (CRESTOR) 20 MG tablet Take 1 tablet (20 mg total) by mouth daily  Qty: 90 tablet, Refills: 3    Associated Diagnoses: Hyperlipidemia, unspecified hyperlipidemia type      sertraline (ZOLOFT) 50 mg tablet Take 1 tablet (50 mg total) by mouth daily  Qty: 90 tablet, Refills: 3    Associated Diagnoses: Depression, unspecified depression type           No discharge procedures on file. PDMP Review     None           ED Provider  Attending physically available and evaluated Cecelia Grant. I managed the patient along with the ED Attending.     Electronically Signed by         Nataly Liriano MD  08/12/23 8841

## 2023-08-12 NOTE — TELEPHONE ENCOUNTER
Regarding: sciatica pain  ----- Message from Sallyanne Castleman sent at 8/12/2023  9:27 AM EDT -----  " I woke up with a lot pain from my sciatica and I can't put pressure on my foot."

## 2023-08-12 NOTE — DISCHARGE INSTRUCTIONS
If your insurance won't cover the lidocaine patches you can also get them over the counter, or Icyhot patches are a viable alternative. If you are using the lidocaine patches you wear them for 12 hours then you take them off for 12 hours. Do NOT exceed 12 hours of wear.

## 2023-08-12 NOTE — Clinical Note
Jade Burr was seen and treated in our emergency department on 8/12/2023. No restrictions            Diagnosis:     Hetal Sharma  may return to work on return date. She may return on this date: 08/14/2023         If you have any questions or concerns, please don't hesitate to call.       Manjinder White MD    ______________________________           _______________          _______________  Hospital Representative                              Date                                Time

## 2023-08-14 ENCOUNTER — OFFICE VISIT (OUTPATIENT)
Dept: PHYSICAL THERAPY | Facility: REHABILITATION | Age: 68
End: 2023-08-14
Payer: COMMERCIAL

## 2023-08-14 DIAGNOSIS — M79.18 PAIN IN RIGHT BUTTOCK: Primary | ICD-10-CM

## 2023-08-14 PROCEDURE — 97110 THERAPEUTIC EXERCISES: CPT | Performed by: PHYSICAL THERAPIST

## 2023-08-14 PROCEDURE — 97112 NEUROMUSCULAR REEDUCATION: CPT | Performed by: PHYSICAL THERAPIST

## 2023-08-14 PROCEDURE — 97140 MANUAL THERAPY 1/> REGIONS: CPT | Performed by: PHYSICAL THERAPIST

## 2023-08-14 NOTE — TELEPHONE ENCOUNTER
Pt called 8/12/23 states she woke up with a lot pain from my sciatica and I can't put pressure on my foot , attempted to reach out to pt to make an appt in regards of pain wasn't able to reach pt left a VM

## 2023-08-14 NOTE — PROGRESS NOTES
Daily Note     Today's date: 2023  Patient name: Ankita Fisher  : 1955  MRN: [id number]  Referring provider: Nabil Mortensen MD  Dx:   Encounter Diagnosis     ICD-10-CM    1. Pain in right buttock  M79.18                      Subjective: The patient reports a flare-up this weekend after possibly irritating it while sitting in a movie theater for two hours. The patient reports she was unable to put weight on the RLE and went to the ER for a steroid pack and an increase in her pain meds. Objective: See treatment diary below      Assessment: Tolerated treatment well and was able to advance multiple exercises without issue. Continued relief noted with hip LAD. Educated the patient on possible DOMS/pain with progressing strengthening exercises today. Plan: Continue per plan of care. Assess response to progressions and adjust treatment as necessary.       Precautions: N/A      Manuals    R Gluteal STM        R Hip Post Mob        R Hip PROM SE SE TP 6' SE 6' NB 6'   R Hip IR/ER MREs        R Hip LAD SE SE TP 4' SE 4'  NB 4'   Neuro Re-Ed        Lateral Step Up  2x8 B 2x8 B 6" 2x8 B 6" 2x8 B 6"   Sidelying Clamshell        Supine Hip Abduction Isometric        SL Total Gym Leg Press 1x8 B 1x8 B 1x8 B L6 1x8 B L6 1x10 B L6   Standing Hip 3 Way - [place]        Hip Hike        SL Hip Abduction  PT assist 2x5 B PT assist 2x5 B PT assist 2x5 B PT assist 2x5 B 1x10 B   Ther Ex        NuStep   L5x10' L5x10'  L5x10'   Dynamic 90-90 Hip Switch        Quadruped Hip Rock Back (Foot crossed behind)        Modified Side Plank        Bridge - March        Hip Thrust - [place]        Diaphragmatic Breathing        ASLR 2x5 B 2x5 B 2x5 B 2x5 B 2x5 B   Supine DKTC 2x15 2x15 2x10 2x10  2x10   Seated lumbar flexion w/pball   10"x10 10"x10 10"x10   Ther Activity        Deadlift - Kettlebell        Sit to Stand - Kettlebell, Tempo        Gait Training        Sidestepping - Resisted, [place] Modalities

## 2023-08-16 ENCOUNTER — SOCIAL WORK (OUTPATIENT)
Dept: BEHAVIORAL/MENTAL HEALTH CLINIC | Facility: CLINIC | Age: 68
End: 2023-08-16
Payer: COMMERCIAL

## 2023-08-16 ENCOUNTER — TELEPHONE (OUTPATIENT)
Dept: INTERNAL MEDICINE CLINIC | Facility: CLINIC | Age: 68
End: 2023-08-16

## 2023-08-16 DIAGNOSIS — F41.9 ANXIETY: Primary | ICD-10-CM

## 2023-08-16 PROCEDURE — 90834 PSYTX W PT 45 MINUTES: CPT | Performed by: SOCIAL WORKER

## 2023-08-16 NOTE — TELEPHONE ENCOUNTER
She went to ER for her sciatica. Her neurontin was increased to 200mg tid and she was restarted on steroids when she was at [facility]. She says she is feeling okay, pain is better but is concerned that once she finishes the 5 dose pack on Friday that the pain will come back so wants to know what she should do?  Her next appt with you is on 8/31

## 2023-08-16 NOTE — PSYCH
Behavioral Health Psychotherapy Progress Note    Psychotherapy Provided: Individual Psychotherapy     Encounter Diagnoses   Name Primary? • Anxiety Yes         Goals addressed in session: Goal 1     DATA: Benjamin Maki today about her feelings re:ways that she has been asserting herself and being more vocal with her expectations for others. Substance Abuse was not addressed during this session. If the client is diagnosed with a co-occurring substance use disorder, please indicate any changes in the frequency or amount of use: . Stage of change for addressing substance use diagnoses: No substance use/Not applicable    ASSESSMENT:  Trisha Bahena presents with a Euthymic/ normal mood. 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 her affect is Normal range and intensity, which is congruent, with her mood and the content of the session. The client has made progress on their goals. Trisha Bahena presents with a minimal risk of suicide, minimal risk of self-harm, and none risk of harm to others. For any risk assessment that surpasses a "low" rating, a safety plan must be developed. A safety plan was indicated: no  If yes, describe in detail     PLAN: Between sessions, Trisha Bahena will continue to engage in acts of self care. At the next session, the therapist will use Supportive Psychotherapy to address the above in further detail. Behavioral Health Treatment Plan and Discharge Planning: Trisha Bahena is aware of and agrees to continue to work on their treatment plan. They have identified and are working toward their discharge goals.  yes    Visit start and stop times:    06/14/23    Start Time: 1520  Stop Time: 1610  Total Visit Time: 50 minutes

## 2023-08-17 ENCOUNTER — OFFICE VISIT (OUTPATIENT)
Dept: PHYSICAL THERAPY | Facility: REHABILITATION | Age: 68
End: 2023-08-17
Payer: COMMERCIAL

## 2023-08-17 DIAGNOSIS — M79.18 PAIN IN RIGHT BUTTOCK: Primary | ICD-10-CM

## 2023-08-17 PROCEDURE — 97112 NEUROMUSCULAR REEDUCATION: CPT | Performed by: PHYSICAL THERAPIST

## 2023-08-17 PROCEDURE — 97140 MANUAL THERAPY 1/> REGIONS: CPT | Performed by: PHYSICAL THERAPIST

## 2023-08-17 PROCEDURE — 97110 THERAPEUTIC EXERCISES: CPT | Performed by: PHYSICAL THERAPIST

## 2023-08-17 NOTE — PROGRESS NOTES
Daily Note     Today's date: 2023  Patient name: Brianne Mcclelland  : 1955  MRN: [id number]  Referring provider: Trell Multani MD  Dx:   Encounter Diagnosis     ICD-10-CM    1. Pain in right buttock  M79.18                      Subjective: I had a great day on Friday, but Saturday was very bad and I ended up in the ED again. Got some steroids and doing better since then. Objective: See treatment diary below      Assessment: Tolerated treatment well. Did appear to have positive response from steroid treatment she received over the weekend, which helped reduce right lower leg pain. Was well tolerant to treatment and exercises today. No exacerbation of symptoms during treatment. Does have improved standing and weight bearing tolerance as well. Patient would benefit from continued PT      Plan: Continue per plan of care.       Precautions: N/A      Manuals    R Gluteal STM      R Hip Post Mob      R Hip PROM SE 6' NB 6' SE 6'   R Hip IR/ER MREs      R Hip LAD SE 4'  NB 4' SE 4'   Neuro Re-Ed      Lateral Step Up 2x8 B 6" 2x8 B 6" 3x6 B 6"   Sidelying Clamshell      Supine Hip Abduction Isometric      SL Total Gym Leg Press 1x8 B L6 1x10 B L6 2x12 B    Standing Hip 3 Way - [place]      Hip Hike      SL Hip Abduction  PT assist 2x5 B 1x10 B 2x8    Ther Ex      NuStep L5x10'  L5x10' L6x10'   Standing PBall Press with March   2x15   Dead Bug with Pball   2x15   Modified Side Plank      Bridge - March      Hip Thrust - [place]      Diaphragmatic Breathing      ASLR 2x5 B 2x5 B 2x8 B   Supine DKTC 2x10  2x10 2x10   Seated lumbar flexion w/pball 10"x10 10"x10 10"x10   Ther Activity      Deadlift - Kettlebell      Sit to Stand - Kettlebell, Tempo      Gait Training      Sidestepping - Resisted, [place]            Modalities

## 2023-08-21 ENCOUNTER — OFFICE VISIT (OUTPATIENT)
Dept: PHYSICAL THERAPY | Facility: REHABILITATION | Age: 68
End: 2023-08-21
Payer: COMMERCIAL

## 2023-08-21 DIAGNOSIS — M79.18 PAIN IN RIGHT BUTTOCK: Primary | ICD-10-CM

## 2023-08-21 PROCEDURE — 97110 THERAPEUTIC EXERCISES: CPT | Performed by: PHYSICAL THERAPIST

## 2023-08-21 PROCEDURE — 97140 MANUAL THERAPY 1/> REGIONS: CPT | Performed by: PHYSICAL THERAPIST

## 2023-08-21 PROCEDURE — 97112 NEUROMUSCULAR REEDUCATION: CPT | Performed by: PHYSICAL THERAPIST

## 2023-08-21 NOTE — PROGRESS NOTES
Daily Note     Today's date: 2023  Patient name: Fito Toledo  : 1955  MRN: [id number]  Referring provider: Raymond Hahn MD  Dx:   Encounter Diagnosis     ICD-10-CM    1. Pain in right buttock  M79.18                      Subjective: The right hip has continued to feel much better since last week. Had a good weekend and was able to go to the gym this morning. Objective: See treatment diary below      Assessment: Tolerated treatment well. Showing improvements in activation of gluteal muscles and standing tolerance during exercises. Did implement a single arm row with trunk rotation today to work on upper body dissociation with core stability integration. Did have LOB 1x when returning back to starting position due to difficulty controlling body eccentrically. Patient would benefit from continued PT      Plan: Continue per plan of care.       Precautions: N/A      Manuals    R Gluteal STM       R Hip Post Mob       R Hip PROM SE 6' NB 6' SE 6' SE 6'   R Hip IR/ER MREs       R Hip LAD SE 4'  NB 4' SE 4' SE 4'   Neuro Re-Ed       Lateral Step Up 2x8 B 6" 2x8 B 6" 3x6 B 6" 3x6 B 6"    Sidelying Clamshell       Single Arm Row with Trunk Rotation    3x8 mtb B   SL Total Gym Leg Press 1x8 B L6 1x10 B L6 2x12 B  2x12 B    Standing Hip 3 Way - [place]       Hip Hike       SL Hip Abduction  PT assist 2x5 B 1x10 B 2x8  2x8    Ther Ex       NuStep L5x10'  L5x10' L6x10' L6x10'    Standing PBall Press with March   2x15 2x15   Dead Bug with Pball   2x15 2x15   Modified Side Plank       Bridge - March       Hip Thrust - [place]       Diaphragmatic Breathing       ASLR 2x5 B 2x5 B 2x8 B 2x8 B    Supine DKTC 2x10  2x10 2x10 2x10    Seated lumbar flexion w/pball 10"x10 10"x10 10"x10 10"x10   Ther Activity       Deadlift - Kettlebell       Sit to Stand - Kettlebell, Tempo       Gait Training       Sidestepping - Resisted, [place]              Modalities

## 2023-08-24 ENCOUNTER — OFFICE VISIT (OUTPATIENT)
Dept: PHYSICAL THERAPY | Facility: REHABILITATION | Age: 68
End: 2023-08-24
Payer: COMMERCIAL

## 2023-08-24 DIAGNOSIS — M54.41 ACUTE RIGHT-SIDED LOW BACK PAIN WITH RIGHT-SIDED SCIATICA: ICD-10-CM

## 2023-08-24 DIAGNOSIS — M79.18 PAIN IN RIGHT BUTTOCK: Primary | ICD-10-CM

## 2023-08-24 PROCEDURE — 97110 THERAPEUTIC EXERCISES: CPT | Performed by: PHYSICAL THERAPIST

## 2023-08-24 PROCEDURE — 97112 NEUROMUSCULAR REEDUCATION: CPT | Performed by: PHYSICAL THERAPIST

## 2023-08-24 RX ORDER — GABAPENTIN 100 MG/1
100 CAPSULE ORAL 3 TIMES DAILY
Qty: 90 CAPSULE | Refills: 0 | Status: SHIPPED | OUTPATIENT
Start: 2023-08-24 | End: 2023-08-25 | Stop reason: SDUPTHER

## 2023-08-25 DIAGNOSIS — M54.41 ACUTE RIGHT-SIDED LOW BACK PAIN WITH RIGHT-SIDED SCIATICA: ICD-10-CM

## 2023-08-27 ENCOUNTER — OFFICE VISIT (OUTPATIENT)
Dept: URGENT CARE | Age: 68
End: 2023-08-27
Payer: COMMERCIAL

## 2023-08-27 VITALS
TEMPERATURE: 97.5 F | HEART RATE: 90 BPM | OXYGEN SATURATION: 98 % | RESPIRATION RATE: 12 BRPM | DIASTOLIC BLOOD PRESSURE: 68 MMHG | SYSTOLIC BLOOD PRESSURE: 114 MMHG

## 2023-08-27 DIAGNOSIS — H57.89 EYE SWELLING, LEFT: Primary | ICD-10-CM

## 2023-08-27 PROCEDURE — 99213 OFFICE O/P EST LOW 20 MIN: CPT

## 2023-08-27 PROCEDURE — 96372 THER/PROPH/DIAG INJ SC/IM: CPT

## 2023-08-27 RX ORDER — PREDNISONE 20 MG/1
20 TABLET ORAL 2 TIMES DAILY WITH MEALS
Qty: 10 TABLET | Refills: 0 | Status: SHIPPED | OUTPATIENT
Start: 2023-08-27 | End: 2023-09-01

## 2023-08-27 RX ORDER — METHYLPREDNISOLONE ACETATE 40 MG/ML
20 INJECTION, SUSPENSION INTRA-ARTICULAR; INTRALESIONAL; INTRAMUSCULAR; SOFT TISSUE ONCE
Status: DISCONTINUED | OUTPATIENT
Start: 2023-08-27 | End: 2023-08-27

## 2023-08-27 RX ORDER — AMOXICILLIN 500 MG/1
500 CAPSULE ORAL EVERY 8 HOURS SCHEDULED
Qty: 21 CAPSULE | Refills: 0 | Status: SHIPPED | OUTPATIENT
Start: 2023-08-27 | End: 2023-09-03

## 2023-08-27 RX ORDER — METHYLPREDNISOLONE ACETATE 40 MG/ML
40 INJECTION, SUSPENSION INTRA-ARTICULAR; INTRALESIONAL; INTRAMUSCULAR; SOFT TISSUE ONCE
Status: COMPLETED | OUTPATIENT
Start: 2023-08-27 | End: 2023-08-27

## 2023-08-27 RX ADMIN — METHYLPREDNISOLONE ACETATE 40 MG: 40 INJECTION, SUSPENSION INTRA-ARTICULAR; INTRALESIONAL; INTRAMUSCULAR; SOFT TISSUE at 08:44

## 2023-08-27 NOTE — PATIENT INSTRUCTIONS
Single dose of injectable steroid given in office. Please begin short course of steroids as directed. Continue to monitor symptoms for 24-48 hours prior to beginning antibiotics. If you notice improvement with steroids alone, do not begin antibiotics. Follow up with PCP within one week. Present to ED if you notice:  -Eye pain/vision changes.   -Worsening swelling/redness.   -Fevers.

## 2023-08-27 NOTE — PROGRESS NOTES
[facility]        NAME: Irma Robledo is a 80 y.o. female  : 1955    MRN: [id number]  DATE: 2023  TIME: 8:38 AM    Assessment and Plan   Eye swelling, left [H57.89]  1. Eye swelling, left  predniSONE 20 mg tablet    amoxicillin (AMOXIL) 500 mg capsule    methylPREDNISolone acetate (DEPO-MEDROL) injection 40 mg    DISCONTINUED: methylPREDNISolone acetate (DEPO-MEDROL) injection 20 mg      Single dose of injectable steroid given in office. Please begin short course of steroids as directed. Continue to monitor symptoms for 24-48 hours prior to beginning antibiotics. If you notice improvement with steroids alone, do not begin antibiotics. Follow up with PCP within one week. Present to ED if you notice:  -Eye pain/vision changes.   -Worsening swelling/redness.   -Fevers. Patient Instructions   Insect Bite or Sting   WHAT YOU NEED TO KNOW:   Most insect bites and stings are not dangerous and go away without treatment. Your symptoms may be mild, or you may develop anaphylaxis. Anaphylaxis is a sudden, life-threatening reaction that needs immediate treatment. Common examples of insects that bite or sting are bees, ticks, mosquitoes, spiders, and ants. Insect bites or stings can lead to diseases such as malaria, West Nile virus, Lyme disease, or [place] Spotted Fever. DISCHARGE INSTRUCTIONS:   Call your local emergency number (911 in the [address]) for signs or symptoms of anaphylaxis,  such as trouble breathing, swelling in your mouth or throat, or wheezing. You may also have itching, a rash, hives, or feel like you are going to faint.   Return to the emergency department if:   • You are stung on your tongue or in your throat.     • A white area forms around the bite.     • You are sweating badly or have body pain.     • You think you were bitten or stung by a poisonous insect.     Call your doctor if:   • You have a fever.     • The area becomes red, warm, tender, and swollen beyond the area of the bite or sting.     • You have questions or concerns about your condition or care.     Medicines: You may need any of the following:  • Antihistamines  decrease itching and rash.     • Epinephrine  is used to treat severe allergic reactions such as anaphylaxis.     • Take your medicine as directed. Contact your healthcare provider if you think your medicine is not helping or if you have side effects. Tell your provider if you are allergic to any medicine. Keep a list of the medicines, vitamins, and herbs you take. Include the amounts, and when and why you take them. Bring the list or the pill bottles to follow-up visits. Carry your medicine list with you in case of an emergency.     Steps to take for signs or symptoms of anaphylaxis:   • Immediately  give 1 shot of epinephrine only into the outer thigh muscle.          • Leave the shot in place  as directed. Your healthcare provider may recommend you leave it in place for up to 10 seconds before you remove it. This helps make sure all of the epinephrine is delivered.     • Call 911 and go to the emergency department,  even if the shot improved symptoms. Do not drive yourself. Bring the used epinephrine shot with you.     Safety precautions to take if you are at risk for anaphylaxis:   • Keep 2 shots of epinephrine with you at all times. You may need a second shot, because epinephrine only works for about 20 minutes and symptoms may return. Your healthcare provider can show you and family members how to give the shot. Check the expiration date every month and replace it before it expires.     • Create an action plan. Your healthcare provider can help you create a written plan that explains the allergy and an emergency plan to treat a reaction.  The plan explains when to give a second epinephrine shot if symptoms return or do not improve after the first. Give copies of the action plan and emergency instructions to family members, work and school staff, and  providers. Show them how to give a shot of epinephrine.     • Carry medical alert identification. Wear medical alert jewelry or carry a card that says you have an insect allergy. Ask your healthcare provider where to get these items.        If an insect bites or stings you:   • Remove the stinger. Scrape the stinger out with your fingernail, edge of a credit card, or a knife blade. Do not squeeze the wound. Gently wash the area with soap and water.     • Remove the tick. Ticks must be removed as soon as possible so you do not get diseases passed through tick bites. Ask your healthcare provider for more information on tick bites and how to remove ticks.     Care for a bite or sting wound:   • Elevate (raise) the area above the level of your heart, if possible. Prop the area on pillows to keep it raised comfortably. Elevate the area for 10 to 20 minutes each hour or as directed by your healthcare provider.          • Use compresses. Soak a clean washcloth in cold water, wring it out, and put it on the bite or sting. Use the compress for 10 to 20 minutes each hour or as directed by your healthcare provider. After 24 to 48 hours, change to warm compresses.     • Apply a paste. Add water to baking soda to make a thick paste. Put the paste on the area for 5 minutes. Rinse gently to remove the paste.     Prevent another insect bite or sting:   • Do not wear bright-colored or flower-print clothing when you plan to spend time outdoors. Do not use hairspray, perfumes, or aftershave.     • Do not leave food out.     • Empty any standing water and wash container with soap and water every 2 days.     • Put screens on all open windows and doors.     • Put insect repellent that contains DEET on skin that is showing when you go outside. Put insect repellent at the top of your boots, bottom of pant legs, and sleeve cuffs.  Wear long sleeves, pants, and shoes.     • Use citronella candles outdoors to help keep mosquitoes away. Put a tick and flea collar on pets.     Follow up with your doctor as directed:  Write down your questions so you remember to ask them during your visits. © Copyright [employer] Bennett 2022 Information is for End User's use only and may not be sold, redistributed or otherwise used for commercial purposes. The above information is an  only. It is not intended as medical advice for individual conditions or treatments. Talk to your doctor, nurse or pharmacist before following any medical regimen to see if it is safe and effective for you. Follow up with PCP in 3-5 days. Proceed to  ER if symptoms worsen. Chief Complaint     Chief Complaint   Patient presents with   • Eye Problem     Left eye swelling and redness; started Friday         History of Present Illness       80year old female with PMH significant for sciatica presents for evaluation of left eye swelling and redness which she first noticed after gardening on Thursday. She recalls that there were many bugs out that night and remembers something landing on this part of her face. She notes progressive redness and swelling which has been responding to Benadryl. She denies eye pain, vision changes, fever, headache, body aches, chill, lip or tongue swelling, difficulty breathing. She notes that of late she has been reacting more severely to bug bites. Eye Problem   Pertinent negatives include no eye discharge, eye redness, fever, itching, nausea, photophobia or vomiting. Review of Systems   Review of Systems   Constitutional: Negative for fatigue and fever. HENT: Negative for congestion, ear discharge, ear pain, postnasal drip, rhinorrhea, sinus pressure, sinus pain, sneezing and sore throat. Eyes: Negative. Negative for photophobia, pain, discharge, redness, itching and visual disturbance. Periorbital redness and swelling   Respiratory: Negative.   Negative for apnea, cough, choking, chest tightness, shortness of breath, wheezing and stridor. Cardiovascular: Negative. Negative for chest pain and palpitations. Gastrointestinal: Negative. Negative for diarrhea, nausea and vomiting. Endocrine: Negative. Negative for polydipsia, polyphagia and polyuria. Genitourinary: Negative. Negative for decreased urine volume and flank pain. Musculoskeletal: Negative. Negative for arthralgias, back pain, myalgias, neck pain and neck stiffness. Skin: Negative. Negative for color change and rash. Allergic/Immunologic: Negative. Negative for environmental allergies. Neurological: Negative. Negative for dizziness, facial asymmetry, light-headedness, numbness and headaches. Hematological: Negative. Negative for adenopathy. Psychiatric/Behavioral: Negative.           Current Medications       Current Outpatient Medications:   •  amoxicillin (AMOXIL) 500 mg capsule, Take 1 capsule (500 mg total) by mouth every 8 (eight) hours for 7 days, Disp: 21 capsule, Rfl: 0  •  predniSONE 20 mg tablet, Take 1 tablet (20 mg total) by mouth 2 (two) times a day with meals for 5 days, Disp: 10 tablet, Rfl: 0  •  aspirin (ECOTRIN LOW STRENGTH) 81 mg EC tablet, Take 1 tablet (81 mg total) by mouth daily, Disp: 30 tablet, Rfl: 0  •  bisacodyl (DULCOLAX) 5 mg EC tablet, Take 1 tablet (5 mg total) by mouth daily as needed for constipation, Disp: 2 tablet, Rfl: 0  •  Calcium Carbonate-Vitamin D (CALCIUM 500/D PO), Take by mouth, Disp: , Rfl:   •  celecoxib (CeleBREX) 200 mg capsule, Take 1 capsule (200 mg total) by mouth 2 (two) times a day, Disp: 60 capsule, Rfl: 0  •  Cholecalciferol (VITAMIN D) 2000 units CAPS, Take by mouth, Disp: , Rfl:   •  docusate sodium (COLACE) 100 mg capsule, Take 100 mg by mouth in the morning, Disp: , Rfl:   •  estradiol (ESTRACE) 0.1 mg/g vaginal cream, Insert 1 g into vagina twice a week (Patient taking differently: if needed), Disp: 42.5 g, Rfl: 3  •  famotidine (PEPCID) 20 mg tablet, Take 1 tablet (20 mg total) by mouth 2 (two) times a day, Disp: 60 tablet, Rfl: 2  •  gabapentin (Neurontin) 100 mg capsule, Take 1 capsule (100 mg total) by mouth 3 (three) times a day, Disp: 90 capsule, Rfl: 0  •  lidocaine (LIDODERM) 5 %, Apply 1 patch topically over 12 hours every 24 hours for 5 days Remove & Discard patch within 12 hours or as directed by MD, Disp: 5 patch, Rfl: 0  •  methocarbamol (ROBAXIN) 500 mg tablet, Take 1 tablet (500 mg total) by mouth 3 (three) times a day as needed for muscle spasms, Disp: 21 tablet, Rfl: 0  •  Multiple Vitamins-Minerals (MULTIVITAL-M PO), Take by mouth, Disp: , Rfl:   •  rosuvastatin (CRESTOR) 20 MG tablet, Take 1 tablet (20 mg total) by mouth daily, Disp: 90 tablet, Rfl: 3  •  sertraline (ZOLOFT) 50 mg tablet, Take 1 tablet (50 mg total) by mouth daily, Disp: 90 tablet, Rfl: 3    Current Facility-Administered Medications:   •  methylPREDNISolone acetate (DEPO-MEDROL) injection 40 mg, 40 mg, Intramuscular, Once, Wilhemena Oms, CRNP    Current Allergies     Allergies as of 08/27/2023 - Reviewed 08/27/2023   Allergen Reaction Noted   • Levaquin [levofloxacin] Swelling and Other (See Comments) 05/20/2016   • Quinolones Other (See Comments) 05/20/2016            The following portions of the patient's history were reviewed and updated as appropriate: allergies, current medications, past family history, past medical history, past social history, past surgical history and problem list.     Past Medical History:   Diagnosis Date   • Anxiety    • Body mass index 27.0-27.9, adult    • Colon cancer screening    • Colon polyp    • Depression    • GERD (gastroesophageal reflux disease)     takes tums occasionaly   • GERD (gastroesophageal reflux disease)    • Hyperlipidemia    • Viral intestinal infection        Past Surgical History:   Procedure Laterality Date   • CARPAL TUNNEL RELEASE Bilateral    • CHOLECYSTECTOMY  08/2011   • COLONOSCOPY     • COLONOSCOPY N/A 10/18/2018    Procedure: COLONOSCOPY;  Surgeon: Lillie Smith MD;  Location: [facility]; Service: Gastroenterology   • CYSTOSCOPY     • OH CMBND ANTERPOST COLPORRAPHY W/CYSTO N/A 5/23/2016    Procedure: COLPORRHAPHY ANTERIOR POSTERIOR WITH GRAFT; ENTEROCELE REPAIR;  Surgeon: Dante Jonas MD;  Location[facility];  Service: UroGynecology          • OH COLONOSCOPY FLX DX W/COLLJ SPEC WHEN PFRMD N/A 10/17/2018    Procedure: COLONOSCOPY;  Surgeon: Lillie Smith MD;  Location: [facility]; Service: Gastroenterology   • OH COLPOPEXY VAGINAL EXTRAPERITONEAL APPROACH N/A 5/23/2016    Procedure: COLPOPEXY VAGINAL EXTRAPERITONEAL (VEC); Surgeon: Dante Jonas MD;  [facility];  Service: UroGynecology          • OH CYSTOURETHROSCOPY N/A 5/23/2016    Procedure: Vicky Hogan;  Surgeon: Dante Jonas MD;  [facility];  Service: UroGynecology          • OH SLING OPERATION STRESS INCONTINENCE N/A 5/23/2016    Procedure: INSERTION PUBOVAGINAL SLING ;  Surgeon: Dante Jonas MD;  [facility];  Service: UroGynecology          • TUBAL LIGATION         Family History   Problem Relation Age of Onset   • Endometrial cancer Mother 76   • COPD Mother    • Coronary artery disease Father    • Heart disease Father    • No Known Problems Sister    • No Known Problems Sister    • No Known Problems Daughter    • No Known Problems Maternal Grandmother    • No Known Problems Maternal Grandfather    • No Known Problems Paternal Grandmother    • No Known Problems Paternal Grandfather    • No Known Problems Brother    • No Known Problems Maternal Aunt    • No Known Problems Maternal Aunt    • No Known Problems Maternal Aunt    • No Known Problems Maternal Aunt    • No Known Problems Paternal Aunt    • No Known Problems Son          Medications have been verified.         Objective   /68 (BP Location: Left arm, Patient Position: Sitting, Cuff Size: Large)   Pulse 90   Temp 97.5 °F (36.4 °C) (Temporal)   Resp 12   SpO2 98% Physical Exam     Physical Exam  Vitals and nursing note reviewed. Constitutional:       General: She is not in acute distress. Appearance: Normal appearance. She is not ill-appearing, toxic-appearing or diaphoretic. HENT:      Head: Normocephalic and atraumatic. Right Ear: External ear normal.      Left Ear: External ear normal.      Nose: Nose normal. No congestion or rhinorrhea. Mouth/Throat:      Mouth: Mucous membranes are dry. Eyes:      Extraocular Movements: Extraocular movements intact. Conjunctiva/sclera: Conjunctivae normal.      Pupils: Pupils are equal, round, and reactive to light. Cardiovascular:      Rate and Rhythm: Normal rate and regular rhythm. Pulses: Normal pulses. Heart sounds: Normal heart sounds. No murmur heard. No friction rub. No gallop. Pulmonary:      Effort: Pulmonary effort is normal. No respiratory distress. Breath sounds: Normal breath sounds. No stridor. No wheezing, rhonchi or rales. Abdominal:      General: Bowel sounds are normal.      Palpations: Abdomen is soft. Tenderness: There is no abdominal tenderness. There is no guarding or rebound. Musculoskeletal:         General: Normal range of motion. Cervical back: Normal range of motion and neck supple. No tenderness. Skin:     General: Skin is warm and dry. Capillary Refill: Capillary refill takes less than 2 seconds. Comments: Swelling and mild erythema of left eye, erythema extending onto left temple. PERRLA, 3 mm bilaterally, (-) nystagmus with EOM. Neurological:      General: No focal deficit present. Mental Status: She is alert and oriented to person, place, and time. Cranial Nerves: No cranial nerve deficit.    Psychiatric:         Mood and Affect: Mood normal.         Behavior: Behavior normal.

## 2023-08-28 RX ORDER — GABAPENTIN 100 MG/1
100 CAPSULE ORAL 3 TIMES DAILY
Qty: 90 CAPSULE | Refills: 0 | Status: SHIPPED | OUTPATIENT
Start: 2023-08-28 | End: 2023-09-27

## 2023-08-30 ENCOUNTER — APPOINTMENT (OUTPATIENT)
Dept: PHYSICAL THERAPY | Facility: REHABILITATION | Age: 68
End: 2023-08-30
Payer: COMMERCIAL

## 2023-08-31 ENCOUNTER — OFFICE VISIT (OUTPATIENT)
Dept: INTERNAL MEDICINE CLINIC | Facility: CLINIC | Age: 68
End: 2023-08-31
Payer: COMMERCIAL

## 2023-08-31 VITALS
HEART RATE: 69 BPM | HEIGHT: 64 IN | WEIGHT: 168 LBS | SYSTOLIC BLOOD PRESSURE: 118 MMHG | TEMPERATURE: 97.7 F | DIASTOLIC BLOOD PRESSURE: 57 MMHG | BODY MASS INDEX: 28.68 KG/M2 | OXYGEN SATURATION: 96 %

## 2023-08-31 DIAGNOSIS — E78.00 PURE HYPERCHOLESTEROLEMIA: ICD-10-CM

## 2023-08-31 DIAGNOSIS — Z00.00 WELL ADULT EXAM: ICD-10-CM

## 2023-08-31 DIAGNOSIS — M54.41 ACUTE RIGHT-SIDED LOW BACK PAIN WITH RIGHT-SIDED SCIATICA: Primary | ICD-10-CM

## 2023-08-31 PROCEDURE — 99214 OFFICE O/P EST MOD 30 MIN: CPT | Performed by: INTERNAL MEDICINE

## 2023-08-31 NOTE — PROGRESS NOTES
Assessment/Plan:           1. Acute right-sided low back pain with right-sided sciatica  Comments: This has improved advised to stop Celebrex and continue gabapentin. 2. Pure hypercholesterolemia  Comments: Following with cardiology continue rosuvastatin. Orders:  -     CBC and differential; Future  -     Comprehensive metabolic panel; Future  -     Urinalysis with microscopic    3. Well adult exam  Comments:  Routine blood work was ordered. Orders:  -     TSH, 3rd generation; Future           1. Acute right-sided low back pain with right-sided sciatica         No problem-specific Assessment & Plan notes found for this encounter. Subjective:      Patient ID: Keisha Madison is a 80 y.o. female. HPI    The following portions of the patient's history were reviewed and updated as appropriate: She  has a past medical history of Anxiety, Body mass index 27.0-27.9, adult, Colon cancer screening, Colon polyp, Depression, GERD (gastroesophageal reflux disease), GERD (gastroesophageal reflux disease), Hyperlipidemia, and Viral intestinal infection. She   Patient Active Problem List    Diagnosis Date Noted   • Chronic idiopathic constipation 02/11/2022   • History of colon polyps 02/11/2022   • Hiatal hernia 09/17/2021   • Pure hypercholesterolemia 08/17/2021   • GERD (gastroesophageal reflux disease)    • Anxiety 62/11/8365   • Lichen sclerosus 76/48/5587     She  has a past surgical history that includes Cholecystectomy (08/2011); Carpal tunnel release (Bilateral); Tubal ligation; Colonoscopy; Cystoscopy; pr colpopexy vaginal extraperitoneal approach (N/A, 5/23/2016); pr cmbnd anterpost colporraphy w/cysto (N/A, 5/23/2016); pr sling operation stress incontinence (N/A, 5/23/2016); pr cystourethroscopy (N/A, 5/23/2016); pr colonoscopy flx dx w/collj spec when pfrmd (N/A, 10/17/2018); and Colonoscopy (N/A, 10/18/2018).   Her family history includes COPD in her mother; Coronary artery disease in her father; Endometrial cancer (age of onset: 76) in her mother; Heart disease in her father; No Known Problems in her brother, daughter, maternal aunt, maternal aunt, maternal aunt, maternal aunt, maternal grandfather, maternal grandmother, paternal aunt, paternal grandfather, paternal grandmother, sister, sister, and son. She  reports that she has never smoked. She has never used smokeless tobacco. She reports current alcohol use of about 1.0 standard drink of alcohol per week. She reports that she does not use drugs.   Current Outpatient Medications   Medication Sig Dispense Refill   • amoxicillin (AMOXIL) 500 mg capsule Take 1 capsule (500 mg total) by mouth every 8 (eight) hours for 7 days 21 capsule 0   • aspirin (ECOTRIN LOW STRENGTH) 81 mg EC tablet Take 1 tablet (81 mg total) by mouth daily 30 tablet 0   • bisacodyl (DULCOLAX) 5 mg EC tablet Take 1 tablet (5 mg total) by mouth daily as needed for constipation 2 tablet 0   • Calcium Carbonate-Vitamin D (CALCIUM 500/D PO) Take by mouth     • celecoxib (CeleBREX) 200 mg capsule Take 1 capsule (200 mg total) by mouth 2 (two) times a day 60 capsule 0   • Cholecalciferol (VITAMIN D) 2000 units CAPS Take by mouth     • docusate sodium (COLACE) 100 mg capsule Take 100 mg by mouth in the morning     • estradiol (ESTRACE) 0.1 mg/g vaginal cream Insert 1 g into vagina twice a week (Patient taking differently: if needed) 42.5 g 3   • famotidine (PEPCID) 20 mg tablet Take 1 tablet (20 mg total) by mouth 2 (two) times a day 60 tablet 2   • gabapentin (Neurontin) 100 mg capsule Take 1 capsule (100 mg total) by mouth 3 (three) times a day 90 capsule 0   • methocarbamol (ROBAXIN) 500 mg tablet Take 1 tablet (500 mg total) by mouth 3 (three) times a day as needed for muscle spasms 21 tablet 0   • Multiple Vitamins-Minerals (MULTIVITAL-M PO) Take by mouth     • predniSONE 20 mg tablet Take 1 tablet (20 mg total) by mouth 2 (two) times a day with meals for 5 days 10 tablet 0   • rosuvastatin (CRESTOR) 20 MG tablet Take 1 tablet (20 mg total) by mouth daily 90 tablet 3   • sertraline (ZOLOFT) 50 mg tablet Take 1 tablet (50 mg total) by mouth daily 90 tablet 3   • lidocaine (LIDODERM) 5 % Apply 1 patch topically over 12 hours every 24 hours for 5 days Remove & Discard patch within 12 hours or as directed by MD Morales patch 0     No current facility-administered medications for this visit.      Current Outpatient Medications on File Prior to Visit   Medication Sig   • amoxicillin (AMOXIL) 500 mg capsule Take 1 capsule (500 mg total) by mouth every 8 (eight) hours for 7 days   • aspirin (ECOTRIN LOW STRENGTH) 81 mg EC tablet Take 1 tablet (81 mg total) by mouth daily   • bisacodyl (DULCOLAX) 5 mg EC tablet Take 1 tablet (5 mg total) by mouth daily as needed for constipation   • Calcium Carbonate-Vitamin D (CALCIUM 500/D PO) Take by mouth   • celecoxib (CeleBREX) 200 mg capsule Take 1 capsule (200 mg total) by mouth 2 (two) times a day   • Cholecalciferol (VITAMIN D) 2000 units CAPS Take by mouth   • docusate sodium (COLACE) 100 mg capsule Take 100 mg by mouth in the morning   • estradiol (ESTRACE) 0.1 mg/g vaginal cream Insert 1 g into vagina twice a week (Patient taking differently: if needed)   • famotidine (PEPCID) 20 mg tablet Take 1 tablet (20 mg total) by mouth 2 (two) times a day   • gabapentin (Neurontin) 100 mg capsule Take 1 capsule (100 mg total) by mouth 3 (three) times a day   • methocarbamol (ROBAXIN) 500 mg tablet Take 1 tablet (500 mg total) by mouth 3 (three) times a day as needed for muscle spasms   • Multiple Vitamins-Minerals (MULTIVITAL-M PO) Take by mouth   • predniSONE 20 mg tablet Take 1 tablet (20 mg total) by mouth 2 (two) times a day with meals for 5 days   • rosuvastatin (CRESTOR) 20 MG tablet Take 1 tablet (20 mg total) by mouth daily   • sertraline (ZOLOFT) 50 mg tablet Take 1 tablet (50 mg total) by mouth daily   • lidocaine (LIDODERM) 5 % Apply 1 patch topically over 12 hours every 24 hours for 5 days Remove & Discard patch within 12 hours or as directed by MD     No current facility-administered medications on file prior to visit. There are no discontinued medications. She is allergic to levaquin [levofloxacin] and quinolones. .    Review of Systems   Constitutional: Negative for appetite change, chills, fatigue and fever. HENT: Negative for sore throat and trouble swallowing. Eyes: Negative for redness. Respiratory: Negative for shortness of breath. Cardiovascular: Negative for chest pain and palpitations. Gastrointestinal: Negative for abdominal pain, constipation and diarrhea. Genitourinary: Negative for dysuria and hematuria. Musculoskeletal: Negative for back pain and neck pain. Skin: Negative for rash. Neurological: Negative for seizures, weakness and headaches. Hematological: Negative for adenopathy. Psychiatric/Behavioral: Negative for confusion. The patient is not nervous/anxious. Objective:      /57 (BP Location: Left arm, Patient Position: Sitting, Cuff Size: Large)   Pulse 69   Temp 97.7 °F (36.5 °C) (Temporal)   Ht 5' 4" (1.626 m)   Wt 76.2 kg (168 lb)   SpO2 96%   BMI 28.84 kg/m²     Results Reviewed     None          No results found for this or any previous visit (from the past 1344 hour(s)). Physical Exam  Constitutional:       General: She is not in acute distress. Appearance: Normal appearance. HENT:      Head: Normocephalic and atraumatic. Nose: Nose normal.      Mouth/Throat:      Mouth: Mucous membranes are moist.   Eyes:      Extraocular Movements: Extraocular movements intact. Pupils: Pupils are equal, round, and reactive to light. Cardiovascular:      Rate and Rhythm: Normal rate and regular rhythm. Pulses: Normal pulses. Heart sounds: Normal heart sounds. No murmur heard. No friction rub. Pulmonary:      Effort: Pulmonary effort is normal. No respiratory distress. Breath sounds: Normal breath sounds. No wheezing. Abdominal:      General: Abdomen is flat. Bowel sounds are normal. There is no distension. Palpations: Abdomen is soft. There is no mass. Tenderness: There is no abdominal tenderness. There is no guarding. Musculoskeletal:         General: Normal range of motion. Neurological:      General: No focal deficit present. Mental Status: She is alert and oriented to person, place, and time. Mental status is at baseline. Cranial Nerves: No cranial nerve deficit. Psychiatric:         Mood and Affect: Mood normal.         Behavior: Behavior normal.       BMI Counseling: Body mass index is 28.84 kg/m². The BMI is above normal. Nutrition recommendations include reducing portion sizes.

## 2023-09-11 ENCOUNTER — TELEPHONE (OUTPATIENT)
Dept: INTERNAL MEDICINE CLINIC | Facility: CLINIC | Age: 68
End: 2023-09-11

## 2023-09-11 DIAGNOSIS — U07.1 COVID-19: Primary | ICD-10-CM

## 2023-09-11 RX ORDER — NIRMATRELVIR AND RITONAVIR 300-100 MG
3 KIT ORAL 2 TIMES DAILY
Qty: 30 TABLET | Refills: 0 | Status: SHIPPED | OUTPATIENT
Start: 2023-09-11 | End: 2023-09-16

## 2023-09-11 NOTE — TELEPHONE ENCOUNTER
Patient took a covid test and it came out positive. She is not terribly symptomatic - she just has a runny nose. She does not want to be down and out for too long though and is wondering if she would qualify to have Paxlovid ordered for her to lessen her symptoms and make it go away quicker.  She uses [facility]

## 2023-09-12 ENCOUNTER — TELEPHONE (OUTPATIENT)
Dept: PSYCHIATRY | Facility: CLINIC | Age: 68
End: 2023-09-12

## 2023-09-12 NOTE — TELEPHONE ENCOUNTER
Patient called to confirm 9/12 1pm was cancelled. Patient cancelled due to [facility]. She stated email was sent to provider to inform and she would like to reschedule. Advised provider would reach out.

## 2023-10-18 ENCOUNTER — SOCIAL WORK (OUTPATIENT)
Dept: BEHAVIORAL/MENTAL HEALTH CLINIC | Facility: CLINIC | Age: 68
End: 2023-10-18
Payer: COMMERCIAL

## 2023-10-18 DIAGNOSIS — F41.9 ANXIETY: Primary | ICD-10-CM

## 2023-10-18 PROCEDURE — 90834 PSYTX W PT 45 MINUTES: CPT | Performed by: SOCIAL WORKER

## 2023-10-18 NOTE — PSYCH
Behavioral Health Psychotherapy Progress Note    Psychotherapy Provided: Individual Psychotherapy     Encounter Diagnoses   Name Primary? • Anxiety Yes           Goals addressed in session: Goal 1     DATA: Gabbi Calhoun spoke today about her feelings re:her desire for her daughter and her father to be closer. She shared various examples of their conflicts and admitted that she can understand her daughter's frustrations, but also has sympathy for her . Substance Abuse was not addressed during this session. If the client is diagnosed with a co-occurring substance use disorder, please indicate any changes in the frequency or amount of use: . Stage of change for addressing substance use diagnoses: No substance use/Not applicable    ASSESSMENT:  Teresa Herrera presents with a Euthymic/ normal mood. 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 her affect is Normal range and intensity, which is congruent, with her mood and the content of the session. The client has made progress on their goals. Teresa Herrera presents with a minimal risk of suicide, minimal risk of self-harm, and none risk of harm to others. For any risk assessment that surpasses a "low" rating, a safety plan must be developed. A safety plan was indicated: no  If yes, describe in detail     PLAN: Between sessions, Teresa Herrera will continue to engage in acts of self care. At the next session, the therapist will use Supportive Psychotherapy to address the above in further detail. Behavioral Health Treatment Plan and Discharge Planning: Teresa Herrera is aware of and agrees to continue to work on their treatment plan. They have identified and are working toward their discharge goals.  yes    Visit start and stop times:    10/18/23  1500  1550  50 mins      Start Time: 1500  Stop Time: 1550  Total Visit Time: 50 minutes

## 2023-10-28 ENCOUNTER — APPOINTMENT (OUTPATIENT)
Dept: LAB | Facility: CLINIC | Age: 68
End: 2023-10-28
Payer: COMMERCIAL

## 2023-10-28 DIAGNOSIS — Z00.00 WELL ADULT EXAM: ICD-10-CM

## 2023-10-28 DIAGNOSIS — E78.00 PURE HYPERCHOLESTEROLEMIA: ICD-10-CM

## 2023-10-30 DIAGNOSIS — E78.5 HYPERLIPIDEMIA, UNSPECIFIED HYPERLIPIDEMIA TYPE: Primary | ICD-10-CM

## 2023-11-01 DIAGNOSIS — E78.5 HYPERLIPIDEMIA, UNSPECIFIED HYPERLIPIDEMIA TYPE: Primary | ICD-10-CM

## 2023-11-04 ENCOUNTER — APPOINTMENT (OUTPATIENT)
Dept: LAB | Facility: CLINIC | Age: 68
End: 2023-11-04
Payer: COMMERCIAL

## 2023-11-04 DIAGNOSIS — E78.5 HYPERLIPIDEMIA, UNSPECIFIED HYPERLIPIDEMIA TYPE: ICD-10-CM

## 2023-11-04 LAB
ALBUMIN SERPL BCP-MCNC: 4.3 G/DL (ref 3.5–5)
ALP SERPL-CCNC: 69 U/L (ref 34–104)
ALT SERPL W P-5'-P-CCNC: 19 U/L (ref 7–52)
ANION GAP SERPL CALCULATED.3IONS-SCNC: 9 MMOL/L
AST SERPL W P-5'-P-CCNC: 21 U/L (ref 13–39)
BACTERIA UR QL AUTO: NORMAL /HPF
BASOPHILS # BLD AUTO: 0.05 THOUSANDS/ÂΜL (ref 0–0.1)
BASOPHILS NFR BLD AUTO: 1 % (ref 0–1)
BILIRUB SERPL-MCNC: 0.95 MG/DL (ref 0.2–1)
BILIRUB UR QL STRIP: NEGATIVE
BUN SERPL-MCNC: 21 MG/DL (ref 5–25)
CALCIUM SERPL-MCNC: 9.8 MG/DL (ref 8.4–10.2)
CHLORIDE SERPL-SCNC: 103 MMOL/L (ref 96–108)
CHOLEST SERPL-MCNC: 172 MG/DL
CLARITY UR: CLEAR
CO2 SERPL-SCNC: 27 MMOL/L (ref 21–32)
COLOR UR: NORMAL
CREAT SERPL-MCNC: 0.81 MG/DL (ref 0.6–1.3)
EOSINOPHIL # BLD AUTO: 0.13 THOUSAND/ÂΜL (ref 0–0.61)
EOSINOPHIL NFR BLD AUTO: 2 % (ref 0–6)
ERYTHROCYTE [DISTWIDTH] IN BLOOD BY AUTOMATED COUNT: 13.2 % (ref 11.6–15.1)
GFR SERPL CREATININE-BSD FRML MDRD: 75 ML/MIN/1.73SQ M
GLUCOSE P FAST SERPL-MCNC: 93 MG/DL (ref 65–99)
GLUCOSE UR STRIP-MCNC: NEGATIVE MG/DL
HCT VFR BLD AUTO: 44.3 % (ref 34.8–46.1)
HDLC SERPL-MCNC: 66 MG/DL
HGB BLD-MCNC: 14.8 G/DL (ref 11.5–15.4)
HGB UR QL STRIP.AUTO: NEGATIVE
IMM GRANULOCYTES # BLD AUTO: 0.03 THOUSAND/UL (ref 0–0.2)
IMM GRANULOCYTES NFR BLD AUTO: 0 % (ref 0–2)
KETONES UR STRIP-MCNC: NEGATIVE MG/DL
LDLC SERPL CALC-MCNC: 96 MG/DL (ref 0–100)
LEUKOCYTE ESTERASE UR QL STRIP: NEGATIVE
LYMPHOCYTES # BLD AUTO: 2.63 THOUSANDS/ÂΜL (ref 0.6–4.47)
LYMPHOCYTES NFR BLD AUTO: 39 % (ref 14–44)
MCH RBC QN AUTO: 29.5 PG (ref 26.8–34.3)
MCHC RBC AUTO-ENTMCNC: 33.4 G/DL (ref 31.4–37.4)
MCV RBC AUTO: 88 FL (ref 82–98)
MONOCYTES # BLD AUTO: 0.34 THOUSAND/ÂΜL (ref 0.17–1.22)
MONOCYTES NFR BLD AUTO: 5 % (ref 4–12)
NEUTROPHILS # BLD AUTO: 3.53 THOUSANDS/ÂΜL (ref 1.85–7.62)
NEUTS SEG NFR BLD AUTO: 53 % (ref 43–75)
NITRITE UR QL STRIP: NEGATIVE
NON-SQ EPI CELLS URNS QL MICRO: NORMAL /HPF
NRBC BLD AUTO-RTO: 0 /100 WBCS
PH UR STRIP.AUTO: 6 [PH]
PLATELET # BLD AUTO: 284 THOUSANDS/UL (ref 149–390)
PMV BLD AUTO: 11 FL (ref 8.9–12.7)
POTASSIUM SERPL-SCNC: 4 MMOL/L (ref 3.5–5.3)
PROT SERPL-MCNC: 7.1 G/DL (ref 6.4–8.4)
PROT UR STRIP-MCNC: NEGATIVE MG/DL
RBC # BLD AUTO: 5.01 MILLION/UL (ref 3.81–5.12)
RBC #/AREA URNS AUTO: NORMAL /HPF
SODIUM SERPL-SCNC: 139 MMOL/L (ref 135–147)
SP GR UR STRIP.AUTO: 1.02 (ref 1–1.03)
TRIGL SERPL-MCNC: 49 MG/DL
TSH SERPL DL<=0.05 MIU/L-ACNC: 1.53 UIU/ML (ref 0.45–4.5)
UROBILINOGEN UR STRIP-ACNC: <2 MG/DL
WBC # BLD AUTO: 6.71 THOUSAND/UL (ref 4.31–10.16)
WBC #/AREA URNS AUTO: NORMAL /HPF

## 2023-11-04 PROCEDURE — 84443 ASSAY THYROID STIM HORMONE: CPT

## 2023-11-04 PROCEDURE — 80061 LIPID PANEL: CPT

## 2023-11-04 PROCEDURE — 85025 COMPLETE CBC W/AUTO DIFF WBC: CPT

## 2023-11-04 PROCEDURE — 80053 COMPREHEN METABOLIC PANEL: CPT

## 2023-11-04 PROCEDURE — 36415 COLL VENOUS BLD VENIPUNCTURE: CPT

## 2023-11-07 ENCOUNTER — OFFICE VISIT (OUTPATIENT)
Dept: CARDIOLOGY CLINIC | Facility: MEDICAL CENTER | Age: 68
End: 2023-11-07
Payer: COMMERCIAL

## 2023-11-07 VITALS
WEIGHT: 167 LBS | OXYGEN SATURATION: 98 % | HEIGHT: 64 IN | HEART RATE: 80 BPM | SYSTOLIC BLOOD PRESSURE: 120 MMHG | DIASTOLIC BLOOD PRESSURE: 72 MMHG | BODY MASS INDEX: 28.51 KG/M2

## 2023-11-07 DIAGNOSIS — E78.5 HYPERLIPIDEMIA, UNSPECIFIED HYPERLIPIDEMIA TYPE: ICD-10-CM

## 2023-11-07 DIAGNOSIS — K44.9 HIATAL HERNIA: ICD-10-CM

## 2023-11-07 DIAGNOSIS — R93.1 AGATSTON CORONARY ARTERY CALCIUM SCORE BETWEEN 100 AND 199: Primary | ICD-10-CM

## 2023-11-07 DIAGNOSIS — Z87.898 HISTORY OF PREDIABETES: ICD-10-CM

## 2023-11-07 DIAGNOSIS — Z82.49 FAMILY HISTORY OF EARLY CAD: ICD-10-CM

## 2023-11-07 DIAGNOSIS — K21.9 GASTROESOPHAGEAL REFLUX DISEASE, UNSPECIFIED WHETHER ESOPHAGITIS PRESENT: ICD-10-CM

## 2023-11-07 DIAGNOSIS — E66.3 OVERWEIGHT: ICD-10-CM

## 2023-11-07 DIAGNOSIS — E66.3 OVERWEIGHT (BMI 25.0-29.9): ICD-10-CM

## 2023-11-07 PROCEDURE — 99214 OFFICE O/P EST MOD 30 MIN: CPT | Performed by: INTERNAL MEDICINE

## 2023-11-07 NOTE — PROGRESS NOTES
Cardiology Follow Up Visit       Jake Glass   [id number]  1955      [facility]  [address] [facility] [id number]      Physician Requesting Consult:   Uli Romo MD    Reason for Follow Up Visit / Principal Problem:  Mrs. Jake Glass is a 80 y.o. female and never smoker with a PMH significant for but not limited to CAD (, 2020), HLD, Prediabetes (resolved), GERD, DAVID and Overweight. She presents to clinic for routine Follow Up. Interval History:  Mrs. Jose Miguel Starr was originally seen in clinic on 04/25/23 to establish care. She'd been at her baseline state of health: independent of adl's, working as a CV RN and Educator , and exercising regularly with routine walking and strength training , when she was seen by her PCP and referred to Cardiology . She'd noted 20-lb weight gain during the COVID pandemic, and felt sluggish overall. 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 She pointed out that she was able to complete treadmill workouts without similar dyspnea. Of note, she previously denied any recent cardiac hospitalizations, prior cardiac history beyond an elevated CCS, or family history of scd. Though she did note a relative with a MI at age 46. We proceeded with an SE that demonstrated no LV Dysfunction or RWMA after 9 min of Exercise, achieving 10.1 METs. We also increased her statin to a high-intensity dose at 20 mg, which she has continued to tolerate. Her repeat FLP demonstrated excellent response and she has continued an active lifestyle. Further, her A1c had returned to normal limits. She currently denies any associated cp, diaphoresis, n/v, sob at rest, palpitations, near syncope, syncope, orthopnea, pnd, or ble edema. She notes ongoing JUDD only when ascending flights of stairs at work but not during routine aerobic activity.  She notes good control of her diet and exercise habits. She reports a diet that is well balanced, sufficient daily water intake and regular exercise that includes a combination of vigorous aerobic activity each week and strength training at least twice a week. She is concerned that she has been unable to control her weight of late. She is otherwise compliant with medications but does not maintain a detailed BP log. Of note, the patient's cardiac risk factor(s) include: advanced age, family history of premature ASCVD, dyslipidemia, and  delivery.      Assessment & Plan     Coronary Artery Calcification,  on 20  Tolerating medical mgmt without progressive symptoms, Though notes of JUDD only while ascending stairs at work, Exercise SE without evidence of ischemia as of 23  - the observed calcium score of 135 fit in the 86th percentile for patients of the same age, gender, and race/ethnicity who are free of clinical cardiovascular disease and treated diabetes  - the estimated WILCOX 10-year risk of a CHD event for her profile including coronary calcium was 5.3%  - the estimated 10-year risk of without factoring in her coronary calcium score would be 3.0%  No further testing at this time, Cont GDMT for CAD on statin, Aggressive risk factor reduction, Cont diet/lifestyle optimization, Will begin   Monitor routinely for now, Record BP/HR and log if symptoms return, ED/Clinic precautions reviewed    HLD, recent FLP:  / TRI 49 / HDL 66 / LDL 96 on 23, 5.4% 10-Year ASCVD Estimated Risk  Improved , Tolerating statin without significant adverse reactions  Cont current medication regimen, cont counseling on diet and lifestyle modification  Monitor FLP routinely as ordered by PCP, will follow peripherally    History of Prediabetes, most recent A1c 5.6 on 23, down from 5.7 on 22  No prior medical mgmt, diet controlled  Cont counseling on diet and lifestyle changes  Monitor Blood Sugar/A1c routinely as ordered by PCP, will follow peripherally    Overweight, Body mass index is 28.67 kg/m². Weight has been stable overall, Unsuccessful attempts at weightloss on her own  Cont to review the importance of diet and lifestyle modification, place referral to  Weight Management  Will monitor routinely at this time    Discussion / Summary:  Precautions and reasons to call our office or proceed to ER were discussed in detail. Patient expressed understanding and questions were answered. Plan on follow up in-clinic in 4 months. Office Visit Diagnosis:  1. Agatston coronary artery calcium score between 100 and 199        2. Family history of early CAD        3. Hyperlipidemia, unspecified hyperlipidemia type        4. History of prediabetes        5. Hiatal hernia        6. Gastroesophageal reflux disease, unspecified whether esophagitis present        7. Overweight  Ambulatory Referral to Weight Management      8. Overweight (BMI 25.0-29. 9)              Subjective   Review of Systems   Constitutional: Negative for chills and fever. HENT:  Negative for congestion and sore throat. Eyes:  Negative for pain and visual disturbance. Cardiovascular:  Positive for dyspnea on exertion (only on the second set of steps). Negative for chest pain, leg swelling, near-syncope, orthopnea, palpitations, paroxysmal nocturnal dyspnea and syncope. Respiratory:  Negative for cough, shortness of breath (none at rest) and wheezing. Hematologic/Lymphatic: Negative for bleeding problem. Does not bruise/bleed easily. Skin:  Negative for itching and rash. Musculoskeletal:  Positive for arthritis (mild) and back pain (after long shifts, but manageable). Negative for joint swelling. Gastrointestinal:  Negative for abdominal pain, constipation, diarrhea, nausea and vomiting. Genitourinary:  Negative for dysuria and hematuria. Neurological:  Negative for numbness and paresthesias.    Psychiatric/Behavioral:  Positive for depression (well controlled). The patient is not nervous/anxious. The following portions of the patient's history were reviewed and updated as appropriate: past medical history, past social history, past family history, past surgical history, current medications, allergies, past surgical history and problem list.  Past Medical History:   Diagnosis Date   • Anxiety    • Body mass index 27.0-27.9, adult    • Colon cancer screening    • Colon polyp    • Depression    • GERD (gastroesophageal reflux disease)     takes tums occasionaly   • GERD (gastroesophageal reflux disease)    • Hyperlipidemia    • Viral intestinal infection      Social History     Socioeconomic History   • Marital status: /Civil Union     Spouse name: Not on file   • Number of children: Not on file   • Years of education: Not on file   • Highest education level: Not on file   Occupational History   • Not on file   Tobacco Use   • Smoking status: Never   • Smokeless tobacco: Never   Vaping Use   • Vaping Use: Never used   Substance and Sexual Activity   • Alcohol use:  Yes     Alcohol/week: 1.0 standard drink of alcohol     Types: 1 Cans of beer per week     Comment: weekly   • Drug use: No   • Sexual activity: Yes     Partners: Male   Other Topics Concern   • Not on file   Social History Narrative   • Not on file     Social Determinants of Health     Financial Resource Strain: Not on file   Food Insecurity: Not on file   Transportation Needs: Not on file   Physical Activity: Not on file   Stress: Not on file   Social Connections: Not on file   Intimate Partner Violence: Not on file   Housing Stability: Not on file     Family History   Problem Relation Age of Onset   • Endometrial cancer Mother 76   • COPD Mother    • Coronary artery disease Father    • Heart disease Father    • No Known Problems Sister    • No Known Problems Sister    • No Known Problems Daughter    • No Known Problems Maternal Grandmother    • No Known Problems Maternal Grandfather    • No Known Problems Paternal Grandmother    • No Known Problems Paternal Grandfather    • No Known Problems Brother    • No Known Problems Maternal Aunt    • No Known Problems Maternal Aunt    • No Known Problems Maternal Aunt    • No Known Problems Maternal Aunt    • No Known Problems Paternal Aunt    • No Known Problems Son      Past Surgical History:   Procedure Laterality Date   • CARPAL TUNNEL RELEASE Bilateral    • CHOLECYSTECTOMY  08/2011   • COLONOSCOPY     • COLONOSCOPY N/A 10/18/2018    Procedure: COLONOSCOPY;  Surgeon: Ritu Gaines MD;  Location: [facility]; Service: Gastroenterology   • CYSTOSCOPY     • MD CMBND ANTERPOST COLPORRAPHY W/CYSTO N/A 5/23/2016    Procedure: COLPORRHAPHY ANTERIOR POSTERIOR WITH GRAFT; ENTEROCELE REPAIR;  Surgeon: Yevgeniy Palacios MD;  [facility];  Service: UroGynecology          • MD COLONOSCOPY FLX DX W/COLLJ SPEC WHEN PFRMD N/A 10/17/2018    Procedure: COLONOSCOPY;  Surgeon: Ritu Gaines MD;  Location: [facility]; Service: Gastroenterology   • MD COLPOPEXY VAGINAL EXTRAPERITONEAL APPROACH N/A 5/23/2016    Procedure: COLPOPEXY VAGINAL EXTRAPERITONEAL (VEC);   Surgeon: Yevgeniy Palacios MD;  [facility];  Service: UroGynecology          • MD CYSTOURETHROSCOPY N/A 5/23/2016    Procedure: Merary Desir;  Surgeon: Yevgeniy Palacios MD;  [facility];  Service: UroGynecology          • MD SLING OPERATION STRESS INCONTINENCE N/A 5/23/2016    Procedure: INSERTION PUBOVAGINAL SLING ;  Surgeon: Yevgeniy Palacios MD;  [facility];  Service: UroGynecology          • TUBAL LIGATION         Current Outpatient Medications:   •  aspirin (ECOTRIN LOW STRENGTH) 81 mg EC tablet, Take 1 tablet (81 mg total) by mouth daily, Disp: 30 tablet, Rfl: 0  •  bisacodyl (DULCOLAX) 5 mg EC tablet, Take 1 tablet (5 mg total) by mouth daily as needed for constipation, Disp: 2 tablet, Rfl: 0  •  Calcium Carbonate-Vitamin D (CALCIUM 500/D PO), Take by mouth, Disp: , Rfl:   •  celecoxib (CeleBREX) 200 mg capsule, Take 1 capsule (200 mg total) by mouth 2 (two) times a day, Disp: 60 capsule, Rfl: 0  •  Cholecalciferol (VITAMIN D) 2000 units CAPS, Take by mouth, Disp: , Rfl:   •  docusate sodium (COLACE) 100 mg capsule, Take 100 mg by mouth in the morning, Disp: , Rfl:   •  estradiol (ESTRACE) 0.1 mg/g vaginal cream, Insert 1 g into vagina twice a week (Patient taking differently: if needed), Disp: 42.5 g, Rfl: 3  •  famotidine (PEPCID) 20 mg tablet, Take 1 tablet (20 mg total) by mouth 2 (two) times a day, Disp: 60 tablet, Rfl: 2  •  methocarbamol (ROBAXIN) 500 mg tablet, Take 1 tablet (500 mg total) by mouth 3 (three) times a day as needed for muscle spasms, Disp: 21 tablet, Rfl: 0  •  Multiple Vitamins-Minerals (MULTIVITAL-M PO), Take by mouth, Disp: , Rfl:   •  rosuvastatin (CRESTOR) 20 MG tablet, Take 1 tablet (20 mg total) by mouth daily, Disp: 90 tablet, Rfl: 3  •  sertraline (ZOLOFT) 50 mg tablet, Take 1 tablet (50 mg total) by mouth daily, Disp: 90 tablet, Rfl: 3  •  gabapentin (Neurontin) 100 mg capsule, Take 1 capsule (100 mg total) by mouth 3 (three) times a day, Disp: 90 capsule, Rfl: 0  •  lidocaine (LIDODERM) 5 %, Apply 1 patch topically over 12 hours every 24 hours for 5 days Remove & Discard patch within 12 hours or as directed by MD, Disp: 5 patch, Rfl: 0  Allergies   Allergen Reactions   • Levaquin [Levofloxacin] Swelling and Other (See Comments)     Fluid in knee   • Quinolones Other (See Comments)     And levaquin     Patient Active Problem List   Diagnosis   • Anxiety   • Lichen sclerosus   • GERD (gastroesophageal reflux disease)   • Pure hypercholesterolemia   • Hiatal hernia   • Chronic idiopathic constipation   • History of colon polyps       Objective     Vitals:    11/07/23 1450   BP: 120/72   BP Location: Left arm   Patient Position: Sitting   Cuff Size: Adult   Pulse: 80   SpO2: 98%   Weight: 75.8 kg (167 lb)   Height: 5' 4" (1.626 m)     Body mass index is 28.67 kg/m². ? Physical Exam  Constitutional:       General: She is not in acute distress. Appearance: Normal appearance. She is not toxic-appearing. HENT:      Head: Normocephalic and atraumatic. Right Ear: External ear normal.      Left Ear: External ear normal.   Eyes:      General: No scleral icterus. Right eye: No discharge. Left eye: No discharge. Conjunctiva/sclera: Conjunctivae normal.   Neck:      Vascular: No carotid bruit. Cardiovascular:      Rate and Rhythm: Normal rate and regular rhythm. Pulses: Normal pulses. Heart sounds: Normal heart sounds. No murmur heard. No gallop. Pulmonary:      Effort: Pulmonary effort is normal. No respiratory distress. Breath sounds: Normal breath sounds. No wheezing or rales. Musculoskeletal:      Cervical back: Neck supple. Right lower leg: No edema. Left lower leg: No edema. Skin:     General: Skin is warm and dry. Capillary Refill: Capillary refill takes less than 2 seconds. Neurological:      General: No focal deficit present. Mental Status: She is alert and oriented to person, place, and time. Mental status is at baseline. Motor: No weakness.       Gait: Gait normal.   Psychiatric:         Mood and Affect: Mood normal.         Behavior: Behavior normal.       Labs:  Lab Results   Component Value Date    K 4.0 11/04/2023    K 4.1 07/08/2021    K 4.2 11/03/2019     11/04/2023     07/08/2021     (H) 11/03/2019    CO2 27 11/04/2023    CO2 28 07/08/2021    CO2 27 11/03/2019    BUN 21 11/04/2023    BUN 18 07/08/2021    BUN 17 11/03/2019    CREATININE 0.81 11/04/2023    CREATININE 0.71 07/08/2021    CREATININE 0.78 11/03/2019    EGFR 75 11/04/2023    EGFR 90 07/08/2021    EGFR 81 11/03/2019    GLUC 92 07/08/2021    GLUC 87 12/28/2016    GLUC 89 05/05/2016    AST 21 11/04/2023    AST 23 07/08/2021    AST 18 11/03/2019    ALT 19 11/04/2023    ALT 32 07/08/2021 ALT 31 11/03/2019    TBILI 0.95 11/04/2023    TBILI 0.65 07/08/2021    TBILI 0.63 11/03/2019    ALB 4.3 11/04/2023    ALB 3.6 07/08/2021    ALB 3.7 11/03/2019    CALCIUM 9.8 11/04/2023    CALCIUM 9.4 07/08/2021    CALCIUM 9.6 11/03/2019      Lab Results   Component Value Date    WBC 6.71 11/04/2023    WBC 6.90 07/08/2021    WBC 6.21 10/16/2018    HGB 14.8 11/04/2023    HGB 14.0 07/08/2021    HGB 14.2 10/16/2018    HCT 44.3 11/04/2023    HCT 42.8 07/08/2021    HCT 43.0 10/16/2018     11/04/2023     07/08/2021     10/16/2018      Lab Results   Component Value Date    CHOLESTEROL 172 11/04/2023    CHOLESTEROL 213 (H) 03/31/2023    CHOLESTEROL 250 (H) 07/26/2022    TRIG 49 11/04/2023    TRIG 48 03/31/2023    TRIG 76 07/26/2022    TRIG 41 07/02/2015    TRIG 38 07/24/2014    HDL 66 11/04/2023    HDL 74 03/31/2023    HDL 71 07/26/2022    HDL 74 07/02/2015    HDL 82 07/24/2014    LDLCALC 96 11/04/2023    LDLCALC 129 (H) 03/31/2023    LDLCALC 164 (H) 07/26/2022    LDLCALC 146 (H) 07/02/2015    LDLCALC 123 (H) 07/24/2014     Lab Results   Component Value Date    HGBA1C 5.6 03/31/2023    HGBA1C 5.7 (H) 07/26/2022    HGBA1C 5.7 (H) 07/08/2021    HGBA1C 5.6 07/02/2015    GLUF 93 11/04/2023    GLUF 94 11/03/2019    GLUF 81 10/16/2018    GLUF 99 07/24/2014     No results found for: "TSH", "FT4"  No results found for: "HSTNI0", "HSTNI2", "HSTNI4", "TROPONINI"  No results found for: "BNP", "NTBNP"     Imaging:  I have personally reviewed pertinent reports.     CT CORONARY ARTERY CALCIUM SCREENING WITHOUT INTRAVENOUS CONTRAST  STUDY DATE: 11/22/20  FINDINGS:   Coronary artery calcium score breakdown is as follows:  Left main coronary artery:  18  Left anterior descending coronary artery and diagonal branches:  104  Left circumflex coronary artery and left marginal branches:  13  Right coronary artery and right marginal branches:  0  Posterior descending coronary artery: 0  TOTAL coronary calcium score: 135  IMPRESSION:  Total coronary calcium score equals 135. For more useful information regarding the prognostic significance of the calcium score, please consult the calculator at the website https://www.valeriano-young.org/. aspx. Imaging:  I have personally reviewed pertinent reports. No results found. Cardiac Studies:  EKG:   Date: 04/25/23, normal sinus rhythm    Tele:   Normal sinus rhythm     Holter:   No results found for this or any previous visit. Recent Device Check:  No results found for this or any previous visit. Previous EPS/Interventions:  No results found for this or any previous visit. Previous Cath/PCI:  No results found for this or any previous visit. Previous STRESS TEST:  No results found for this or any previous visit. ECHO:  No results found for this or any previous visit. PAULINA:  No results found for this or any previous visit. CMR:  No results found for this or any previous visit. Counseling / Coordination of Care:  During our visit, I spent 40 minutes with the patient, and greater than 55% of this time was spent on counseling and coordination of care, including addressing diagnostic results, prognosis, risks and benefits of treatment options, instructions for management, patient/family education, importance of treatment compliance, along with risk factor reductions. Dictation Disclaimer: This note was completed in part utilizing [employer] direct voice recognition software. Grammatical errors, random word insertion, spelling mistakes, and incomplete sentences may be an occasional consequence of the system secondary to software limitations, ambient noise and hardware issues. At the time of dictation, efforts were made to edit, clarify and /or correct errors. Please read the chart carefully and recognize, using context, where substitutions have occurred.   If you have any questions or concerns about the context, text or information contained within the body of this dictation, please contact myself, the provider, for further clarification.      Jeane Tobin, DO 11/14/23

## 2023-11-20 ENCOUNTER — SOCIAL WORK (OUTPATIENT)
Dept: BEHAVIORAL/MENTAL HEALTH CLINIC | Facility: CLINIC | Age: 68
End: 2023-11-20
Payer: COMMERCIAL

## 2023-11-20 DIAGNOSIS — F41.9 ANXIETY: Primary | ICD-10-CM

## 2023-11-20 PROCEDURE — 90834 PSYTX W PT 45 MINUTES: CPT | Performed by: SOCIAL WORKER

## 2023-11-20 NOTE — PSYCH
Behavioral Health Psychotherapy Progress Note    Psychotherapy Provided: Individual Psychotherapy     Encounter Diagnoses   Name Primary? • Anxiety Yes       Goals addressed in session: Goal 1     DATA: Rosy Kaur spoke animatedly today about her feelings re:her 's struggles to manage his emotions and reactions to others on a variety of occasions over the past 6 years. She vocalized an understanding of the reasons that Rowan Joseph is alienated from his family members. Substance Abuse was not addressed during this session. If the client is diagnosed with a co-occurring substance use disorder, please indicate any changes in the frequency or amount of use: . Stage of change for addressing substance use diagnoses: No substance use/Not applicable    ASSESSMENT:  Clarita Dumont presents with a Euthymic/ normal mood. She has no intention of leaving her  and has made progress letting go, particularly when anxious. her affect is Normal range and intensity, which is congruent, with her mood and the content of the session. The client has made progress on their goals. Clarita Dumont presents with a minimal risk of suicide, minimal risk of self-harm, and none risk of harm to others. For any risk assessment that surpasses a "low" rating, a safety plan must be developed. A safety plan was indicated: no  If yes, describe in detail     PLAN: Between sessions, Clarita Dumont will discuss with her  ways that they can share their concerns with each other and their respective therapists. At the next session, the therapist will use Supportive Psychotherapy to address the above in further detail. Behavioral Health Treatment Plan and Discharge Planning: Clarita Dumont is aware of and agrees to continue to work on their treatment plan. They have identified and are working toward their discharge goals.  yes    Visit start and stop times:    10/18/23  1500  1550  50 mins      Start Time: 1600  Stop Time: 2046  Total Visit Time: 50 minutes

## 2023-11-30 ENCOUNTER — OFFICE VISIT (OUTPATIENT)
Dept: INTERNAL MEDICINE CLINIC | Facility: CLINIC | Age: 68
End: 2023-11-30
Payer: COMMERCIAL

## 2023-11-30 VITALS
TEMPERATURE: 98.2 F | SYSTOLIC BLOOD PRESSURE: 134 MMHG | BODY MASS INDEX: 28.67 KG/M2 | HEART RATE: 70 BPM | OXYGEN SATURATION: 97 % | DIASTOLIC BLOOD PRESSURE: 60 MMHG | HEIGHT: 64 IN

## 2023-11-30 DIAGNOSIS — R73.01 IMPAIRED FASTING BLOOD SUGAR: ICD-10-CM

## 2023-11-30 DIAGNOSIS — Z23 ENCOUNTER FOR IMMUNIZATION: ICD-10-CM

## 2023-11-30 DIAGNOSIS — E78.00 PURE HYPERCHOLESTEROLEMIA: ICD-10-CM

## 2023-11-30 DIAGNOSIS — Z00.00 ANNUAL PHYSICAL EXAM: Primary | ICD-10-CM

## 2023-11-30 PROCEDURE — 90677 PCV20 VACCINE IM: CPT

## 2023-11-30 PROCEDURE — 99397 PER PM REEVAL EST PAT 65+ YR: CPT | Performed by: INTERNAL MEDICINE

## 2023-11-30 PROCEDURE — 90471 IMMUNIZATION ADMIN: CPT

## 2023-11-30 NOTE — PATIENT INSTRUCTIONS
Wellness Visit for Adults   AMBULATORY CARE:   A wellness visit  is when you see your healthcare provider to get screened for health problems. Your healthcare provider will also give you advice on how to stay healthy. Write down your questions so you remember to ask them. Ask your healthcare provider how often you should have a wellness visit. What happens at a wellness visit:  Your healthcare provider will ask about your health, and your family history of health problems. This includes high blood pressure, heart disease, and cancer. He or she will ask if you have symptoms that concern you, if you smoke, and about your mood. You may also be asked about your intake of medicines, supplements, food, and alcohol. Any of the following may be done: Your weight  will be checked. Your height may also be checked so your body mass index (BMI) can be calculated. Your BMI shows if you are at a healthy weight. Your blood pressure  and heart rate will be checked. Your temperature may also be checked. Blood and urine tests  may be done. Blood tests may be done to check your cholesterol levels. Abnormal cholesterol levels increase your risk for heart disease and stroke. You may also need a blood or urine test to check for diabetes if you are at increased risk. Urine tests may be done to look for signs of an infection or kidney disease. A physical exam  includes checking your heartbeat and lungs with a stethoscope. Your healthcare provider may also check your skin to look for sun damage. Screening tests  may be recommended. A screening test is done to check for diseases that may not cause symptoms. The screening tests you may need depend on your age, gender, family history, and lifestyle habits. For example, colorectal screening may be recommended if you are 48years old or older. Screening tests you need if you are a woman:   A Pap smear  is used to screen for cervical cancer.  Pap smears are usually done every 3 to 5 years depending on your age. You may need them more often if you have had abnormal Pap smear test results in the past. Ask your healthcare provider how often you should have a Pap smear. A mammogram  is an x-ray of your breasts to screen for breast cancer. Experts recommend mammograms every 2 years starting at age 48 years. You may need a mammogram at age 52 years or younger if you have an increased risk for breast cancer. Talk to your healthcare provider about when you should start having mammograms and how often you need them. Vaccines you may need:   Get an influenza vaccine  every year. The influenza vaccine protects you from the flu. Several types of viruses cause the flu. The viruses change over time, so new vaccines are made each year. Get a tetanus-diphtheria (Td) booster vaccine  every 10 years. This vaccine protects you against tetanus and diphtheria. Tetanus is a severe infection that may cause painful muscle spasms and lockjaw. Diphtheria is a severe bacterial infection that causes a thick covering in the back of your mouth and throat. Get a human papillomavirus (HPV) vaccine  if you are female and aged 23 to 32 or male 23 to 24 and never received it. This vaccine protects you from HPV infection. HPV is the most common infection spread by sexual contact. HPV may also cause vaginal, penile, and anal cancers. Get a pneumococcal vaccine  if you are aged 72 years or older. The pneumococcal vaccine is an injection given to protect you from pneumococcal disease. Pneumococcal disease is an infection caused by pneumococcal bacteria. The infection may cause pneumonia, meningitis, or an ear infection. Get a shingles vaccine  if you are 60 or older, even if you have had shingles before. The shingles vaccine is an injection to protect you from the varicella-zoster virus. This is the same virus that causes chickenpox.  Shingles is a painful rash that develops in people who had chickenpox or have been exposed to the virus. How to eat healthy:  My Plate is a model for planning healthy meals. It shows the types and amounts of foods that should go on your plate. Fruits and vegetables make up about half of your plate, and grains and protein make up the other half. A serving of dairy is included on the side of your plate. The amount of calories and serving sizes you need depends on your age, gender, weight, and height. Examples of healthy foods are listed below:  Eat a variety of vegetables  such as dark green, red, and orange vegetables. You can also include canned vegetables low in sodium (salt) and frozen vegetables without added butter or sauces. Eat a variety of fresh fruits , canned fruit in 100% juice, frozen fruit, and dried fruit. Include whole grains. At least half of the grains you eat should be whole grains. Examples include whole-wheat bread, wheat pasta, brown rice, and whole-grain cereals such as oatmeal.    Eat a variety of protein foods such as seafood (fish and shellfish), lean meat, and poultry without skin (turkey and chicken). Examples of lean meats include pork leg, shoulder, or tenderloin, and beef round, sirloin, tenderloin, and extra lean ground beef. Other protein foods include eggs and egg substitutes, beans, peas, soy products, nuts, and seeds. Choose low-fat dairy products such as skim or 1% milk or low-fat yogurt, cheese, and cottage cheese. Limit unhealthy fats  such as butter, hard margarine, and shortening. Exercise:  Exercise at least 30 minutes per day on most days of the week. Some examples of exercise include walking, biking, dancing, and swimming. You can also fit in more physical activity by taking the stairs instead of the elevator or parking farther away from stores. Include muscle strengthening activities 2 days each week. Regular exercise provides many health benefits.  It helps you manage your weight, and decreases your risk for type 2 diabetes, heart disease, stroke, and high blood pressure. Exercise can also help improve your mood. Ask your healthcare provider about the best exercise plan for you. General health and safety guidelines:   Do not smoke. Nicotine and other chemicals in cigarettes and cigars can cause lung damage. Ask your healthcare provider for information if you currently smoke and need help to quit. E-cigarettes or smokeless tobacco still contain nicotine. Talk to your healthcare provider before you use these products. Limit alcohol. A drink of alcohol is 12 ounces of beer, 5 ounces of wine, or 1½ ounces of liquor. Lose weight, if needed. Being overweight increases your risk of certain health conditions. These include heart disease, high blood pressure, type 2 diabetes, and certain types of cancer. Protect your skin. Do not sunbathe or use tanning beds. Use sunscreen with a SPF 15 or higher. Apply sunscreen at least 15 minutes before you go outside. Reapply sunscreen every 2 hours. Wear protective clothing, hats, and sunglasses when you are outside. Drive safely. Always wear your seatbelt. Make sure everyone in your car wears a seatbelt. A seatbelt can save your life if you are in an accident. Do not use your cell phone when you are driving. This could distract you and cause an accident. Pull over if you need to make a call or send a text message. Practice safe sex. Use latex condoms if are sexually active and have more than one partner. Your healthcare provider may recommend screening tests for sexually transmitted infections (STIs). Wear helmets, lifejackets, and protective gear. Always wear a helmet when you ride a bike or motorcycle, go skiing, or play sports that could cause a head injury. Wear protective equipment when you play sports. Wear a lifejacket when you are on a boat or doing water sports.     © Copyright [facility] 2023 Information is for End User's use only and may not be sold, redistributed or otherwise used for commercial purposes. The above information is an  only. It is not intended as medical advice for individual conditions or treatments. Talk to your doctor, nurse or pharmacist before following any medical regimen to see if it is safe and effective for you.

## 2023-11-30 NOTE — PROGRESS NOTES
ADULT ANNUAL [address] INTERNAL MEDICINE    NAME: Thang Hamilton  AGE: 80 y.o. SEX: female  : 1955     DATE: 2023     Assessment and Plan:     Problem List Items Addressed This Visit          Other    Pure hypercholesterolemia     Other Visit Diagnoses       Annual physical exam    -  Primary    Impaired fasting blood sugar        Encounter for immunization        Relevant Orders    Pneumococcal Conjugate Vaccine 20-valent (Pcv20) (Completed)              Immunizations and preventive care screenings were discussed with patient today. Appropriate education was printed on patient's after visit summary. Counseling:  Exercise: the importance of regular exercise/physical activity was discussed. Recommend exercise 3-5 times per week for at least 30 minutes. Return in about 4 months (around 3/30/2024). Chief Complaint:     Chief Complaint   Patient presents with    Follow-up     14 week follow up blood work     Madan Slaughter done 23    Physical Exam      History of Present Illness:     Adult Annual Physical   Patient here for a comprehensive physical exam. The patient reports no problems. Diet and Physical Activity  Diet/Nutrition: well balanced diet and consuming 3-5 servings of fruits/vegetables daily. Exercise: walking, moderate cardiovascular exercise, and 3-4 times a week on average. Depression Screening  PHQ-2/9 Depression Screening           General Health  Sleep: sleeps well and gets 7-8 hours of sleep on average. Hearing: normal - bilateral.  Vision: no vision problems, goes for regular eye exams, most recent eye exam <1 year ago, and wears glasses. Dental: regular dental visits and brushes teeth twice daily. /GYN Health  Follows with gynecology? yes   Patient is: postmenopausal  Last menstrual period: 17 years  Contraceptive method:  N/A . Advanced Care Planning  Do you have an advanced directive? no  Do you have a durable medical power of ? no     Review of Systems:     Review of Systems   Constitutional:  Negative for appetite change, chills, fatigue and fever. HENT:  Negative for sore throat and trouble swallowing. Eyes:  Negative for redness. Respiratory:  Negative for shortness of breath. Cardiovascular:  Negative for chest pain and palpitations. Gastrointestinal:  Negative for abdominal pain, constipation and diarrhea. Genitourinary:  Negative for dysuria and hematuria. Musculoskeletal:  Negative for back pain and neck pain. Skin:  Negative for rash. Neurological:  Negative for seizures, weakness and headaches. Hematological:  Negative for adenopathy. Psychiatric/Behavioral:  Negative for confusion. The patient is not nervous/anxious. Past Medical History:     Past Medical History:   Diagnosis Date    Anxiety     Body mass index 27.0-27.9, adult     Colon cancer screening     Colon polyp     Depression     GERD (gastroesophageal reflux disease)     takes tums occasionaly    GERD (gastroesophageal reflux disease)     Hyperlipidemia     Viral intestinal infection       Past Surgical History:     Past Surgical History:   Procedure Laterality Date    CARPAL TUNNEL RELEASE Bilateral     CHOLECYSTECTOMY  08/2011    COLONOSCOPY      COLONOSCOPY N/A 10/18/2018    Procedure: COLONOSCOPY;  Surgeon: Valorie Jarrett MD;  Location: [facility]; Service: Gastroenterology    CYSTOSCOPY      CA CMBND ANTERPOST COLPORRAPHY W/CYSTO N/A 5/23/2016    Procedure: COLPORRHAPHY ANTERIOR POSTERIOR WITH GRAFT; ENTEROCELE REPAIR;  Surgeon: Marcio Bettencourt MD;  Location[facility];  Service: UroGynecology           CA COLONOSCOPY FLX DX W/COLLJ SPEC WHEN PFRMD N/A 10/17/2018    Procedure: COLONOSCOPY;  Surgeon: Valorie Jarrett MD;  Location: [facility];   Service: Gastroenterology    CA COLPOPEXY VAGINAL EXTRAPERITONEAL APPROACH N/A 5/23/2016    Procedure: COLPOPEXY VAGINAL EXTRAPERITONEAL (VEC); Surgeon: Prosper Lundberg MD;  [facility];  Service: UroGynecology           AZ CYSTOURETHROSCOPY N/A 5/23/2016    Procedure: Marimar Whittakernarda;  Surgeon: Prosper Lundberg MD;  [facility];  Service: UroGynecology           AZ SLING OPERATION STRESS INCONTINENCE N/A 5/23/2016    Procedure: Bubba Israel ;  Surgeon: Prosper Lundberg MD;  [facility];  Service: UroGynecology           TUBAL LIGATION        Social History:     Social History     Socioeconomic History    Marital status: /Civil Union     Spouse name: None    Number of children: None    Years of education: None    Highest education level: None   Occupational History    None   Tobacco Use    Smoking status: Never    Smokeless tobacco: Never   Vaping Use    Vaping Use: Never used   Substance and Sexual Activity    Alcohol use:  Yes     Alcohol/week: 1.0 standard drink of alcohol     Types: 1 Cans of beer per week     Comment: weekly    Drug use: No    Sexual activity: Yes     Partners: Male   Other Topics Concern    None   Social History Narrative    None     Social Determinants of Health     Financial Resource Strain: Not on file   Food Insecurity: Not on file   Transportation Needs: Not on file   Physical Activity: Not on file   Stress: Not on file   Social Connections: Not on file   Intimate Partner Violence: Not on file   Housing Stability: Not on file      Family History:     Family History   Problem Relation Age of Onset    Endometrial cancer Mother 76    COPD Mother     Coronary artery disease Father     Heart disease Father     No Known Problems Sister     No Known Problems Sister     Hypertension Brother     No Known Problems Son     No Known Problems Daughter     No Known Problems Maternal Grandmother     No Known Problems Maternal Grandfather     No Known Problems Paternal Grandmother     No Known Problems Paternal Grandfather     No Known Problems Maternal Aunt     No Known Problems Maternal Aunt     No Known Problems Maternal Aunt     No Known Problems Maternal Aunt     No Known Problems Paternal Aunt       Current Medications:     Current Outpatient Medications   Medication Sig Dispense Refill    aspirin (ECOTRIN LOW STRENGTH) 81 mg EC tablet Take 1 tablet (81 mg total) by mouth daily 30 tablet 0    bisacodyl (DULCOLAX) 5 mg EC tablet Take 1 tablet (5 mg total) by mouth daily as needed for constipation 2 tablet 0    Calcium Carbonate-Vitamin D (CALCIUM 500/D PO) Take by mouth      celecoxib (CeleBREX) 200 mg capsule Take 1 capsule (200 mg total) by mouth 2 (two) times a day 60 capsule 0    Cholecalciferol (VITAMIN D) 2000 units CAPS Take by mouth      docusate sodium (COLACE) 100 mg capsule Take 100 mg by mouth in the morning      estradiol (ESTRACE) 0.1 mg/g vaginal cream Insert 1 g into vagina twice a week (Patient taking differently: if needed) 42.5 g 3    famotidine (PEPCID) 20 mg tablet Take 1 tablet (20 mg total) by mouth 2 (two) times a day 60 tablet 2    Multiple Vitamins-Minerals (MULTIVITAL-M PO) Take by mouth      rosuvastatin (CRESTOR) 20 MG tablet Take 1 tablet (20 mg total) by mouth daily 90 tablet 3    sertraline (ZOLOFT) 50 mg tablet Take 1 tablet (50 mg total) by mouth daily 90 tablet 3    gabapentin (Neurontin) 100 mg capsule Take 1 capsule (100 mg total) by mouth 3 (three) times a day 90 capsule 0    lidocaine (LIDODERM) 5 % Apply 1 patch topically over 12 hours every 24 hours for 5 days Remove & Discard patch within 12 hours or as directed by MD 5 patch 0    methocarbamol (ROBAXIN) 500 mg tablet Take 1 tablet (500 mg total) by mouth 3 (three) times a day as needed for muscle spasms (Patient not taking: Reported on 11/30/2023) 21 tablet 0     No current facility-administered medications for this visit. Allergies:      Allergies   Allergen Reactions    Levaquin [Levofloxacin] Swelling and Other (See Comments)     Fluid in knee    Quinolones Other (See Comments)     And levaquin      Physical Exam:     /60 (BP Location: Left arm, Patient Position: Sitting, Cuff Size: Large)   Pulse 70   Temp 98.2 °F (36.8 °C) (Temporal)   Ht 5' 4" (1.626 m)   SpO2 97%   BMI 28.67 kg/m²     Physical Exam  Vitals and nursing note reviewed. Constitutional:       General: She is not in acute distress. Appearance: She is well-developed. HENT:      Head: Normocephalic and atraumatic. Mouth/Throat:      Mouth: Mucous membranes are moist.   Eyes:      Conjunctiva/sclera: Conjunctivae normal.   Cardiovascular:      Rate and Rhythm: Normal rate and regular rhythm. Heart sounds: No murmur heard. Pulmonary:      Effort: Pulmonary effort is normal. No respiratory distress. Breath sounds: Normal breath sounds. Abdominal:      General: Abdomen is flat. Palpations: Abdomen is soft. Tenderness: There is no abdominal tenderness. Musculoskeletal:         General: No swelling. Cervical back: Normal range of motion and neck supple. Skin:     General: Skin is warm and dry. Capillary Refill: Capillary refill takes less than 2 seconds. Neurological:      General: No focal deficit present. Mental Status: She is alert.    Psychiatric:         Mood and Affect: Mood normal.          Janell Lewis MD  [address]

## 2023-12-04 ENCOUNTER — TELEPHONE (OUTPATIENT)
Dept: PSYCHIATRY | Facility: CLINIC | Age: 68
End: 2023-12-04

## 2023-12-04 NOTE — TELEPHONE ENCOUNTER
Patient is calling regarding cancelling an appointment.     Date/Time: 12/19 at 4 pm    Reason: patient    Patient was rescheduled: YES [] NO [x]  If yes, when was Patient reschedule for:     Patient requesting call back to reschedule: YES [] NO [x]    Patient did not want to reschedule, will be seen on next appt 1/16 at 3 pm.

## 2024-01-02 ENCOUNTER — DOCUMENTATION (OUTPATIENT)
Dept: BEHAVIORAL/MENTAL HEALTH CLINIC | Facility: CLINIC | Age: 69
End: 2024-01-02

## 2024-01-02 NOTE — PROGRESS NOTES
Treatment Plan Tracking    # 1Treatment Plan not completed within required time limits due to: Phoebe Colbert canceled appointment and Plan will be udated during next visit.

## 2024-01-16 ENCOUNTER — TELEPHONE (OUTPATIENT)
Dept: PSYCHIATRY | Facility: CLINIC | Age: 69
End: 2024-01-16

## 2024-01-16 ENCOUNTER — DOCUMENTATION (OUTPATIENT)
Dept: BEHAVIORAL/MENTAL HEALTH CLINIC | Facility: CLINIC | Age: 69
End: 2024-01-16

## 2024-01-16 NOTE — PROGRESS NOTES
Treatment Plan Tracking    # 1Treatment Plan not completed within required time limits due to: Phoebe Colbert canceled appointment on 01/16/24.

## 2024-01-16 NOTE — TELEPHONE ENCOUNTER
Patient is calling regarding cancelling an appointment.    Date/Time: 1/16/2024 at 3 pm    Reason: pt got called into work/weather    Patient was rescheduled: YES [] NO [x]  If yes, when was Patient reschedule for: n/a      Patient requesting call back to reschedule: YES [] NO [x]

## 2024-01-30 ENCOUNTER — DOCUMENTATION (OUTPATIENT)
Dept: BEHAVIORAL/MENTAL HEALTH CLINIC | Facility: CLINIC | Age: 69
End: 2024-01-30

## 2024-01-30 NOTE — PROGRESS NOTES
Treatment Plan Tracking    # 1Treatment Plan not completed within required time limits due to:  not being seen during past 30 days.  .

## 2024-02-14 ENCOUNTER — SOCIAL WORK (OUTPATIENT)
Dept: BEHAVIORAL/MENTAL HEALTH CLINIC | Facility: CLINIC | Age: 69
End: 2024-02-14
Payer: COMMERCIAL

## 2024-02-14 DIAGNOSIS — F41.9 ANXIETY: Primary | ICD-10-CM

## 2024-02-14 PROCEDURE — 90834 PSYTX W PT 45 MINUTES: CPT | Performed by: SOCIAL WORKER

## 2024-02-14 NOTE — BH TREATMENT PLAN
"Outpatient Behavioral Health Psychotherapy Treatment Plan    Phoebe Georgeelidia  1955     Date of Initial Psychotherapy Assessment: Date of Current Treatment Plan: 02/14/24  Treatment Plan Target Date: 2/14/24  Treatment Plan Expiration Date: 8/14/24    Diagnosis:   1. Anxiety              Area(s) of Need: I will \"always be neurotic,\" but I know my purpose.      Long Term Goal 1 (in the client's own words): I want to maintain peace.     Stage of Change: Action    Target Date for completion: 8/14/24     Anticipated therapeutic modalities: Supportive Psychotherapy     People identified to complete this goal: Self      Objective 1: (identify the means of measuring success in meeting the objective): I will continue to be  purposeful with my words / actions      Objective 2: (identify the means of measuring success in meeting the objective): I will continue to participate in therapy sessions as a regular part of my self care.       I am currently under the care of a [facility] psychiatric provider: no    My [facility] psychiatric provider is:     I am currently taking psychiatric medications: No    I feel that I will be ready for discharge from mental health care when I reach the following (measurable goal/objective): When I feel guaranteed that my anxiety will not overwhelm me.        I have created my Crisis Plan and have been offered a copy of this plan    Behavioral Health Treatment Plan  Luke: Diagnosis and Treatment Plan explained to Phoebe Simmsara Filemon acknowledges an understanding of their diagnosis. Phoebe Colbert agrees to this treatment plan.    I have been offered a copy of this Treatment Plan. yes    Phoebe Colbert, 1955, actively participated in the review and update of this treatment plan Phoebe Colbert  provided verbal consent on 2/14/2024 at 4 PM. The treatment plan was transcribed into the Electronic Health Record at a later time.                                      "

## 2024-02-14 NOTE — PSYCH
"Behavioral Health Psychotherapy Progress Note    Psychotherapy Provided: Individual Psychotherapy     Encounter Diagnoses   Name Primary?   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  She reported that [place] and they plan to travel more together.  Substance Abuse was not addressed during this session. If the client is diagnosed with a co-occurring substance use disorder, please indicate any changes in the frequency or amount of use: . Stage of change for addressing substance use diagnoses: No substance use/Not applicable    ASSESSMENT:  Phoebe Colbert presents with a Euthymic/ normal mood. She has reached a level of acceptance with her life, her children and her marriage, and is able to recognize the progress that she has made in letting go of anxiety / control issues.  .  her affect is Normal range and intensity, which is congruent, with her mood and the content of the session. The client has made progress on their goals.     Phoebe Colbert presents with a minimal risk of suicide, minimal risk of self-harm, and none risk of harm to others.    For any risk assessment that surpasses a \"low\" rating, a safety plan must be developed.    A safety plan was indicated: no  If yes, describe in detail     PLAN: Between sessions, Phoebe Colbert will discuss with her  ways that they can share their concerns with each other and their respective therapists.  At the next session, the therapist will use Supportive Psychotherapy to address the above in further detail.    Behavioral Health Treatment Plan and Discharge Planning: Phoebe Colbert is aware of and agrees to continue to work on their treatment plan. They have identified and are working toward their discharge goals. yes    Visit start and stop " times:    2/14/24Treatment Plan Tracking    # 2Treatment Plan not completed within required time limits due to:  not being seen for the past 90 days .                     1500  1550  50 mins      Start Time: 1500  Stop Time: 1550  Total Visit Time: 50 minutes

## 2024-03-11 DIAGNOSIS — Z87.898 HISTORY OF PREDIABETES: Primary | ICD-10-CM

## 2024-03-11 DIAGNOSIS — E78.5 HYPERLIPIDEMIA, UNSPECIFIED HYPERLIPIDEMIA TYPE: ICD-10-CM

## 2024-03-12 ENCOUNTER — SOCIAL WORK (OUTPATIENT)
Dept: BEHAVIORAL/MENTAL HEALTH CLINIC | Facility: CLINIC | Age: 69
End: 2024-03-12
Payer: COMMERCIAL

## 2024-03-12 DIAGNOSIS — F41.9 ANXIETY: Primary | ICD-10-CM

## 2024-03-12 PROCEDURE — 90834 PSYTX W PT 45 MINUTES: CPT | Performed by: SOCIAL WORKER

## 2024-03-13 NOTE — PSYCH
"Behavioral Health Psychotherapy Progress Note    Psychotherapy Provided: Individual Psychotherapy     Encounter Diagnoses   Name Primary?    Anxiety Yes           Goals addressed in session: Goal 1     DATA: Phoebe spoke about her feelings re: her daughter's impending divorce and the pride that she has in how her daughter conducts herself.  She continues to express enjoyment in teaching and does not feel a desire to \"cut back\" at this time.  Substance Abuse was not addressed during this session. If the client is diagnosed with a co-occurring substance use disorder, please indicate any changes in the frequency or amount of use: . Stage of change for addressing substance use diagnoses: No substance use/Not applicable    ASSESSMENT:  Phoebe Colbert presents with a Euthymic/ normal mood. She has reached a level of acceptance with her life, her children and her marriage, and is able to recognize the progress that she has made in letting go of anxiety / control issues.  However, she continues to rely on monthly sessions for validation and support. .  her affect is Normal range and intensity, which is congruent, with her mood and the content of the session. The client has made progress on their goals.     Phoebe Colbert presents with a minimal risk of suicide, minimal risk of self-harm, and none risk of harm to others.    For any risk assessment that surpasses a \"low\" rating, a safety plan must be developed.    A safety plan was indicated: no  If yes, describe in detail     PLAN: Between sessions, Phoebe Colbert will discuss with her  ways that they can share their concerns with each other and their respective therapists.  At the next session, the therapist will use Supportive Psychotherapy to address the above in further detail.    Behavioral Health Treatment Plan and Discharge Planning: Phoebe Colbert is aware of and agrees to continue to work on their treatment plan. They have identified and are working toward " their discharge goals. yes    Visit start and stop times:    3/12/24      Start Time: 1500  Stop Time: 1545  Total Visit Time: 45 minutes

## 2024-04-09 ENCOUNTER — APPOINTMENT (OUTPATIENT)
Dept: LAB | Facility: CLINIC | Age: 69
End: 2024-04-09

## 2024-04-09 DIAGNOSIS — E78.5 HYPERLIPIDEMIA, UNSPECIFIED HYPERLIPIDEMIA TYPE: ICD-10-CM

## 2024-04-09 DIAGNOSIS — Z87.898 HISTORY OF PREDIABETES: ICD-10-CM

## 2024-04-09 DIAGNOSIS — Z00.8 HEALTH EXAMINATION IN POPULATION SURVEY: ICD-10-CM

## 2024-04-09 LAB
CHOLEST SERPL-MCNC: 178 MG/DL
EST. AVERAGE GLUCOSE BLD GHB EST-MCNC: 117 MG/DL
HBA1C MFR BLD: 5.7 %
HDLC SERPL-MCNC: 65 MG/DL
LDLC SERPL CALC-MCNC: 101 MG/DL (ref 0–100)
NONHDLC SERPL-MCNC: 113 MG/DL
TRIGL SERPL-MCNC: 58 MG/DL

## 2024-04-09 PROCEDURE — 36415 COLL VENOUS BLD VENIPUNCTURE: CPT

## 2024-04-09 PROCEDURE — 83036 HEMOGLOBIN GLYCOSYLATED A1C: CPT

## 2024-04-09 PROCEDURE — 80061 LIPID PANEL: CPT

## 2024-04-10 ENCOUNTER — OFFICE VISIT (OUTPATIENT)
Dept: CARDIOLOGY CLINIC | Facility: CLINIC | Age: 69
End: 2024-04-10
Payer: COMMERCIAL

## 2024-04-10 VITALS
BODY MASS INDEX: 27.91 KG/M2 | DIASTOLIC BLOOD PRESSURE: 60 MMHG | SYSTOLIC BLOOD PRESSURE: 110 MMHG | HEIGHT: 64 IN | HEART RATE: 71 BPM | WEIGHT: 163.5 LBS

## 2024-04-10 DIAGNOSIS — I25.10 CORONARY ARTERY DISEASE INVOLVING NATIVE CORONARY ARTERY OF NATIVE HEART WITHOUT ANGINA PECTORIS: ICD-10-CM

## 2024-04-10 DIAGNOSIS — Z82.49 FAMILY HISTORY OF EARLY CAD: ICD-10-CM

## 2024-04-10 DIAGNOSIS — F41.9 ANXIETY: ICD-10-CM

## 2024-04-10 DIAGNOSIS — K21.9 GASTROESOPHAGEAL REFLUX DISEASE WITHOUT ESOPHAGITIS: ICD-10-CM

## 2024-04-10 DIAGNOSIS — R93.1 AGATSTON CORONARY ARTERY CALCIUM SCORE BETWEEN 100 AND 199: Primary | ICD-10-CM

## 2024-04-10 DIAGNOSIS — R73.03 PREDIABETES: ICD-10-CM

## 2024-04-10 DIAGNOSIS — E66.3 OVERWEIGHT (BMI 25.0-29.9): ICD-10-CM

## 2024-04-10 DIAGNOSIS — E78.5 HYPERLIPIDEMIA, UNSPECIFIED HYPERLIPIDEMIA TYPE: ICD-10-CM

## 2024-04-10 PROCEDURE — 99214 OFFICE O/P EST MOD 30 MIN: CPT | Performed by: INTERNAL MEDICINE

## 2024-04-10 RX ORDER — ROSUVASTATIN CALCIUM 20 MG/1
40 TABLET, COATED ORAL DAILY
Qty: 90 TABLET | Refills: 3 | Status: SHIPPED | OUTPATIENT
Start: 2024-04-10 | End: 2024-04-10

## 2024-04-10 RX ORDER — ROSUVASTATIN CALCIUM 40 MG/1
40 TABLET, COATED ORAL DAILY
Qty: 90 TABLET | Refills: 3 | Status: SHIPPED | OUTPATIENT
Start: 2024-04-10 | End: 2025-04-05

## 2024-04-10 NOTE — PROGRESS NOTES
Cardiology Follow Up Visit       Phoebe Colbert   [id number]  1955      [facility]  [address] [place] [address] [place] [id number]      Physician Requesting Consult:   Chip Awad MD    Reason for Follow Up Visit / Principal Problem:  Mrs. Phoebe Colbert is a 69 y.o. female and never smoker with a PMH significant for but not limited to CAD (, 2020), HLD, Prediabetes (resolved), GERD, DAVID and Overweight.  She presents to clinic for routine Follow Up.    Interval History:  Mrs. Colbert was originally seen in clinic on 04/25/23 to establish care. She'd been at her baseline state of health: independent of adl's, working as a CV RN and Educator , and exercising regularly with routine walking and strength training , when she was seen by her PCP and referred to Cardiology .  She'd noted a 20-lb weight gain during the COVID pandemic, and felt sluggish overall.  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  She pointed out that she was able to complete treadmill workouts without similar dyspnea.  Of note, she previously denied any recent cardiac hospitalizations, prior cardiac history beyond her elevated CCS, or family history of scd.  Though she did note a relative with a MI at age 52 and that all her siblings are on statins. We proceeded with an SE that demonstrated no LV Dysfunction or RWMA after 9 min of Exercise, achieving 10.1 METs.   We also increased her statin to a high-intensity dose at 20 mg, which she tolerated well.  During our follow up visit on 11/07/23, her repeat FLP demonstrated excellent response and her A1c returned to normal limits. She was concerned about weight control, and she was referred to  Weight Mgmt.    Since our last visit, she's done well overall though she continued to struggle with weight loss. Her FLP/A1c though stable have up trended slightly.  However, she notes  continued improvement in ascending stairs with routine conditioning.  She currently denies any associated cp, diaphoresis, n/v, sob at rest, palpitations, near syncope, syncope, orthopnea, pnd, or ble edema. She notes good control of her diet and exercise habits.  She reports a diet that is well balanced, sufficient daily water intake and regular exercise that includes a combination of vigorous aerobic activity each week and strength training at least twice a week. She is otherwise compliant with medications but does not maintain a detailed BP log.  Of note, the patient's cardiac risk factor(s) include: advanced age, family history of premature ASCVD, dyslipidemia, and  delivery.     Assessment & Plan   CAD/Coronary Artery Calcification,  on 20     Tolerating medical mgmt without progressive symptoms, Exercise SE without evidence of ischemia on 23, Now ascending stairs regularly with fewer symptoms  Cont GDMT for CAD on asa/statin, Cont risk factor reduction, Cont diet/lifestyle optimization  Monitor routinely for now, Record BP/HR and log if symptoms return, ED/Clinic precautions reviewed    HLD, recent FLP:  / TRI 58 / HDL 65 /  on 24, 6.2% 10-Year ASCVD Estimated Risk  Tolerating statin without significant adverse reactions, Slight up trend in CHO/LDL but also in A1c, likely reflective of dietary changes  Increase rosuvastatin to 40 mg, Check FLP prior to next visit, Cont counseling on diet and lifestyle modification  Monitor FLP routinely as ordered by PCP, will follow peripherally    Prediabetes, most recent A1c 5.7 on 24, up from 5.6 on 23  No prior medical mgmt, diet controlled, Essentially stable though slight up trend from last year likely reflective of dietary changes  Check A1c prior to next visit, Cont counseling on diet and lifestyle changes  Monitor Blood Sugar/A1c routinely as ordered by PCP, will follow peripherally    Overweight, Body mass  index is 28.06 kg/m².  Weight has been down-trending overall: down 5-7 lbs over the last year  Cont to review the importance of diet and lifestyle modification, cont ongoing workup and treatment by  Weight Management  Will monitor routinely at this time    Discussion / Summary:  Precautions and reasons to call our office or proceed to ER were discussed in detail. Patient expressed understanding and questions were answered. Plan on follow up in-clinic in 4 months.    Office Visit Diagnosis:  1. Agatston coronary artery calcium score between 100 and 199        2. Coronary artery disease involving native coronary artery of native heart without angina pectoris        3. Gastroesophageal reflux disease without esophagitis        4. Hyperlipidemia, unspecified hyperlipidemia type  Lipid panel    Hemoglobin A1C    rosuvastatin (CRESTOR) 40 MG tablet    DISCONTINUED: rosuvastatin (CRESTOR) 20 MG tablet      5. Prediabetes        6. Anxiety        7. Family history of early CAD        8. Overweight (BMI 25.0-29.9)                Subjective   Review of Systems   Constitutional: Negative for chills and fever.   HENT:  Negative for congestion and sore throat.    Eyes:  Negative for pain and visual disturbance.   Cardiovascular:  Positive for dyspnea on exertion (only on the second set of steps). Negative for chest pain, leg swelling, near-syncope, orthopnea, palpitations, paroxysmal nocturnal dyspnea and syncope.   Respiratory:  Negative for cough, shortness of breath (none at rest) and wheezing.    Hematologic/Lymphatic: Negative for bleeding problem. Does not bruise/bleed easily.   Skin:  Negative for itching and rash.   Musculoskeletal:  Positive for arthritis (mild) and back pain (after long shifts, but manageable). Negative for joint swelling.   Gastrointestinal:  Negative for abdominal pain, constipation, diarrhea, nausea and vomiting.   Genitourinary:  Negative for dysuria and hematuria.   Neurological:  Negative for  numbness and paresthesias.   Psychiatric/Behavioral:  Positive for depression (well controlled). The patient is not nervous/anxious.          The following portions of the patient's history were reviewed and updated as appropriate: past medical history, past social history, past family history, past surgical history, current medications, allergies, past surgical history and problem list.  Past Medical History:   Diagnosis Date   • Anxiety    • Body mass index 27.0-27.9, adult    • Colon cancer screening    • Colon polyp    • Depression    • GERD (gastroesophageal reflux disease)     takes tums occasionaly   • GERD (gastroesophageal reflux disease)    • Hyperlipidemia    • Viral intestinal infection      Social History     Socioeconomic History   • Marital status: /Civil Union     Spouse name: Not on file   • Number of children: Not on file   • Years of education: Not on file   • Highest education level: Not on file   Occupational History   • Not on file   Tobacco Use   • Smoking status: Never   • Smokeless tobacco: Never   Vaping Use   • Vaping status: Never Used   Substance and Sexual Activity   • Alcohol use: Yes     Alcohol/week: 1.0 standard drink of alcohol     Types: 1 Cans of beer per week     Comment: weekly   • Drug use: No   • Sexual activity: Yes     Partners: Male   Other Topics Concern   • Not on file   Social History Narrative   • Not on file     Social Determinants of Health     Financial Resource Strain: Not on file   Food Insecurity: Not on file   Transportation Needs: Not on file   Physical Activity: Not on file   Stress: Not on file   Social Connections: Not on file   Intimate Partner Violence: Not on file   Housing Stability: Not on file     Family History   Problem Relation Age of Onset   • Endometrial cancer Mother 68   • COPD Mother    • Coronary artery disease Father    • Heart disease Father    • No Known Problems Sister    • No Known Problems Sister    • Hypertension Brother    • No  Known Problems Son    • No Known Problems Daughter    • No Known Problems Maternal Grandmother    • No Known Problems Maternal Grandfather    • No Known Problems Paternal Grandmother    • No Known Problems Paternal Grandfather    • No Known Problems Maternal Aunt    • No Known Problems Maternal Aunt    • No Known Problems Maternal Aunt    • No Known Problems Maternal Aunt    • No Known Problems Paternal Aunt      Past Surgical History:   Procedure Laterality Date   • CARPAL TUNNEL RELEASE Bilateral    • CHOLECYSTECTOMY  08/2011   • COLONOSCOPY     • COLONOSCOPY N/A 10/18/2018    Procedure: COLONOSCOPY;  Surgeon: Faisal Miller MD;  Location: [facility];  Service: Gastroenterology   • CYSTOSCOPY     • TX CMBND ANTERPOST COLPORRAPHY W/CYSTO N/A 5/23/2016    Procedure: COLPORRHAPHY ANTERIOR POSTERIOR WITH GRAFT; ENTEROCELE REPAIR;  Surgeon: Micha Bryant MD;  [facility];  Service: UroGynecology          • TX COLONOSCOPY FLX DX W/COLLJ SPEC WHEN PFRMD N/A 10/17/2018    Procedure: COLONOSCOPY;  Surgeon: Faisal Miller MD;  Location: [facility];  Service: Gastroenterology   • TX COLPOPEXY VAGINAL EXTRAPERITONEAL APPROACH N/A 5/23/2016    Procedure: COLPOPEXY VAGINAL EXTRAPERITONEAL (VEC);  Surgeon: Micha Bryant MD;  [facility];  Service: UroGynecology          • TX CYSTOURETHROSCOPY N/A 5/23/2016    Procedure: CYSTOSCOPY;  Surgeon: Micha Bryant MD;  [facility];  Service: UroGynecology          • TX SLING OPERATION STRESS INCONTINENCE N/A 5/23/2016    Procedure: INSERTION PUBOVAGINAL SLING ;  Surgeon: Micha Bryant MD;  [facility];  Service: UroGynecology          • TUBAL LIGATION         Current Outpatient Medications:   •  aspirin (ECOTRIN LOW STRENGTH) 81 mg EC tablet, Take 1 tablet (81 mg total) by mouth daily, Disp: 30 tablet, Rfl: 0  •  bisacodyl (DULCOLAX) 5 mg EC tablet, Take 1 tablet (5 mg total) by mouth daily as needed for constipation, Disp: 2 tablet, Rfl: 0  •   "Calcium Carbonate-Vitamin D (CALCIUM 500/D PO), Take by mouth, Disp: , Rfl:   •  Cholecalciferol (VITAMIN D) 2000 units CAPS, Take by mouth, Disp: , Rfl:   •  docusate sodium (COLACE) 100 mg capsule, Take 100 mg by mouth in the morning, Disp: , Rfl:   •  famotidine (PEPCID) 20 mg tablet, Take 1 tablet (20 mg total) by mouth 2 (two) times a day, Disp: 60 tablet, Rfl: 2  •  Multiple Vitamins-Minerals (MULTIVITAL-M PO), Take by mouth, Disp: , Rfl:   •  rosuvastatin (CRESTOR) 40 MG tablet, Take 1 tablet (40 mg total) by mouth daily, Disp: 90 tablet, Rfl: 3  •  sertraline (ZOLOFT) 50 mg tablet, Take 1 tablet (50 mg total) by mouth daily, Disp: 90 tablet, Rfl: 3  Allergies   Allergen Reactions   • Levaquin [Levofloxacin] Swelling and Other (See Comments)     Fluid in knee   • Quinolones Other (See Comments)     And levaquin     Patient Active Problem List   Diagnosis   • Anxiety   • Lichen sclerosus   • GERD (gastroesophageal reflux disease)   • Pure hypercholesterolemia   • Hiatal hernia   • Chronic idiopathic constipation   • History of colon polyps   • Overweight       Objective     Vitals:    04/10/24 1526   BP: 110/60   BP Location: Left arm   Patient Position: Sitting   Cuff Size: Large   Pulse: 71   Weight: 74.2 kg (163 lb 8 oz)   Height: 5' 4\" (1.626 m)       Body mass index is 28.06 kg/m².  ?  Physical Exam  Constitutional:       General: She is not in acute distress.     Appearance: Normal appearance. She is not toxic-appearing.   HENT:      Head: Normocephalic and atraumatic.      Right Ear: External ear normal.      Left Ear: External ear normal.   Eyes:      General: No scleral icterus.        Right eye: No discharge.         Left eye: No discharge.      Conjunctiva/sclera: Conjunctivae normal.   Neck:      Vascular: No carotid bruit.   Cardiovascular:      Rate and Rhythm: Normal rate and regular rhythm.      Pulses: Normal pulses.      Heart sounds: Normal heart sounds. No murmur heard.     No gallop. "   Pulmonary:      Effort: Pulmonary effort is normal. No respiratory distress.      Breath sounds: Normal breath sounds. No wheezing or rales.   Musculoskeletal:      Cervical back: Neck supple.      Right lower leg: No edema.      Left lower leg: No edema.   Skin:     General: Skin is warm and dry.      Capillary Refill: Capillary refill takes less than 2 seconds.   Neurological:      General: No focal deficit present.      Mental Status: She is alert and oriented to person, place, and time. Mental status is at baseline.      Motor: No weakness.      Gait: Gait normal.   Psychiatric:         Mood and Affect: Mood normal.         Behavior: Behavior normal.       Labs:  Lab Results   Component Value Date    K 4.0 11/04/2023    K 4.1 07/08/2021    K 4.2 11/03/2019     11/04/2023     07/08/2021     (H) 11/03/2019    CO2 27 11/04/2023    CO2 28 07/08/2021    CO2 27 11/03/2019    BUN 21 11/04/2023    BUN 18 07/08/2021    BUN 17 11/03/2019    CREATININE 0.81 11/04/2023    CREATININE 0.71 07/08/2021    CREATININE 0.78 11/03/2019    EGFR 75 11/04/2023    EGFR 90 07/08/2021    EGFR 81 11/03/2019    GLUC 92 07/08/2021    GLUC 87 12/28/2016    GLUC 89 05/05/2016    AST 21 11/04/2023    AST 23 07/08/2021    AST 18 11/03/2019    ALT 19 11/04/2023    ALT 32 07/08/2021    ALT 31 11/03/2019    TBILI 0.95 11/04/2023    TBILI 0.65 07/08/2021    TBILI 0.63 11/03/2019    ALB 4.3 11/04/2023    ALB 3.6 07/08/2021    ALB 3.7 11/03/2019    CALCIUM 9.8 11/04/2023    CALCIUM 9.4 07/08/2021    CALCIUM 9.6 11/03/2019      Lab Results   Component Value Date    WBC 6.71 11/04/2023    WBC 6.90 07/08/2021    WBC 6.21 10/16/2018    HGB 14.8 11/04/2023    HGB 14.0 07/08/2021    HGB 14.2 10/16/2018    HCT 44.3 11/04/2023    HCT 42.8 07/08/2021    HCT 43.0 10/16/2018     11/04/2023     07/08/2021     10/16/2018      Lab Results   Component Value Date    CHOLESTEROL 178 04/09/2024    CHOLESTEROL 172 11/04/2023     "CHOLESTEROL 213 (H) 03/31/2023    TRIG 58 04/09/2024    TRIG 49 11/04/2023    TRIG 48 03/31/2023    TRIG 41 07/02/2015    TRIG 38 07/24/2014    HDL 65 04/09/2024    HDL 66 11/04/2023    HDL 74 03/31/2023    HDL 74 07/02/2015    HDL 82 07/24/2014    LDLCALC 101 (H) 04/09/2024    LDLCALC 96 11/04/2023    LDLCALC 129 (H) 03/31/2023    LDLCALC 146 (H) 07/02/2015    LDLCALC 123 (H) 07/24/2014     Lab Results   Component Value Date    HGBA1C 5.7 (H) 04/09/2024    HGBA1C 5.6 03/31/2023    HGBA1C 5.7 (H) 07/26/2022    HGBA1C 5.6 07/02/2015    GLUF 93 11/04/2023    GLUF 94 11/03/2019    GLUF 81 10/16/2018    GLUF 99 07/24/2014     No results found for: \"TSH\", \"FT4\"  No results found for: \"HSTNI0\", \"HSTNI2\", \"HSTNI4\", \"TROPONINI\"  No results found for: \"BNP\", \"NTBNP\"     Imaging:  I have personally reviewed pertinent reports.  No results found.    Imaging:  I have personally reviewed pertinent reports.  No results found.    Cardiac Studies:  EKG:   Date: 04/25/23, normal sinus rhythm    Tele:   Normal sinus rhythm     Holter:   No results found for this or any previous visit.    Recent Device Check:  No results found for this or any previous visit.    Previous EPS/Interventions:  No results found for this or any previous visit.    Previous CCS/CCTA:  CT CORONARY ARTERY CALCIUM SCREENING WITHOUT INTRAVENOUS CONTRAST  STUDY DATE: 11/22/20  FINDINGS:   Coronary artery calcium score breakdown is as follows:  Left main coronary artery:  18  Left anterior descending coronary artery and diagonal branches:  104  Left circumflex coronary artery and left marginal branches:  13  Right coronary artery and right marginal branches:  0  Posterior descending coronary artery: 0  TOTAL coronary calcium score:  135  IMPRESSION:  Total coronary calcium score equals 135.     Previous Cath/PCI:  No results found for this or any previous visit.    Previous STRESS TEST:  Results for testing completed on the encounter of 05/09/23  Study: Echo stress " test, exercise  Interpreted Summary  •  Left Ventricle: The left ventricular ejection fraction is 60%. Systolic function is normal. Wall motion is normal.  •  Stress ECG: No ST deviation is noted. The ECG was negative for ischemia. The stress ECG is negative for ischemia after maximal exercise.  •  Post Stress Echo: Left ventricle cavity has normal reduction in size post-stress. The left ventricle systolic function is normal post-stress with an EF of 70 %.  •  Echo Post Impression: This study shows a low prognostic risk. The study is normal, after maximal exercise.    ECHO:  No results found for this or any previous visit.    PAULINA:  No results found for this or any previous visit.    CMR:  No results found for this or any previous visit.    Counseling / Coordination of Care:  During our visit, I spent 20 minutes with the patient, and greater than 50% of this time was spent on counseling and coordination of care, including addressing diagnostic results, prognosis, risks and benefits of treatment options, instructions for management, patient/family education, importance of treatment compliance, along with risk factor reductions.    Dictation Disclaimer:  This note was completed in part utilizing [employer] direct voice recognition software. Grammatical errors, random word insertion, spelling mistakes, and incomplete sentences may be an occasional consequence of the system secondary to software limitations, ambient noise and hardware issues. At the time of dictation, efforts were made to edit, clarify and /or correct errors.  Please read the chart carefully and recognize, using context, where substitutions have occurred.  If you have any questions or concerns about the context, text or information contained within the body of this dictation, please contact myself, the provider, for further clarification.     Lachelle Marquez, DO 06/02/24

## 2024-04-16 ENCOUNTER — OFFICE VISIT (OUTPATIENT)
Dept: BARIATRICS | Facility: CLINIC | Age: 69
End: 2024-04-16
Payer: COMMERCIAL

## 2024-04-16 VITALS
RESPIRATION RATE: 16 BRPM | HEIGHT: 64 IN | BODY MASS INDEX: 28.17 KG/M2 | WEIGHT: 165 LBS | DIASTOLIC BLOOD PRESSURE: 80 MMHG | SYSTOLIC BLOOD PRESSURE: 120 MMHG | HEART RATE: 79 BPM

## 2024-04-16 DIAGNOSIS — E66.3 OVERWEIGHT: Primary | ICD-10-CM

## 2024-04-16 DIAGNOSIS — F41.9 ANXIETY: ICD-10-CM

## 2024-04-16 DIAGNOSIS — E78.00 PURE HYPERCHOLESTEROLEMIA: ICD-10-CM

## 2024-04-16 PROCEDURE — 99204 OFFICE O/P NEW MOD 45 MIN: CPT

## 2024-04-16 NOTE — ASSESSMENT & PLAN NOTE
-Discussed options of HealthyCORE, VLCD, and Conservative Program and the role of weight loss medications.   -Patient is interested in pursing Healthy Core Program and follow up visits with medical weight management provider.    -Initial weight loss goal of 5-10% weight loss for improved health  -Discussed how weight loss can improve co-morbid conditions and or prevent weight-related complications.   -Patient lifestyle habits were reviewed and barriers to weight loss were addressed today.   -Labs reviewed.     Weight loss medications discussed, and patient is not interested in medication. Patient would like to enroll in a program that will help her with accountability and provide her with further education on dietary lifestyle changes.     Patient is scheduled for an appointment to meet with registered dietician to initiate Healthy Core Program.     -Labs reviewed.

## 2024-04-16 NOTE — PROGRESS NOTES
Assessment/Plan:    Overweight  -Discussed options of HealthyCORE, VLCD, and Conservative Program and the role of weight loss medications.   -Patient is interested in pursing Healthy Core Program and follow up visits with medical weight management provider.    -Initial weight loss goal of 5-10% weight loss for improved health  -Discussed how weight loss can improve co-morbid conditions and or prevent weight-related complications.   -Patient lifestyle habits were reviewed and barriers to weight loss were addressed today.   -Labs reviewed.     Weight loss medications discussed, and patient is not interested in medication. Patient would like to enroll in a program that will help her with accountability and provide her with further education on dietary lifestyle changes.     -Labs reviewed.       Pure hypercholesterolemia  As per patient she follows up with PCP and is currently taking Rosuvastatin 40 mg daily.     Anxiety  As per patient controlled.          Phoebe was seen today for consult.    Diagnoses and all orders for this visit:    Pure hypercholesterolemia    Overweight  -     Ambulatory Referral to Weight Management    Anxiety      Daily Calorie Goal: 1.200-1,300/day    Recommendations:  Nutrition:  Don't skip meals, eat at least three times per day.   Mindful eating, pay attention to your body, eat when you feel hungry.  Incorporate a diet that consist of lean protein, carbohydrates, and fiber.     Continue Food log to keep track of your daily caloric intake.   5.   Continue with getting at least 64 ounces of water daily.     Exercise:  Continue incorporating exercise 3-4 times a week with a combination of aerobic and resistance training.  Try to incorporate different exercises and change work out routine. Doing the same exercises can lead to weight loss plateau.       Total time spent reviewing chart, interviewing patient, examining patient, discussing plan, answering all questions, and documentin  min,  with >50% face-to-face time spent. Discussed lifestyle intervention program, very low-calorie diet program and conservative program. Discussed the role of weight loss medications.  Counseled patient on diet behavior and exercise modification for weight loss.    Follow up in approximately 3 months with Non-Surgical Physician/Advanced Practitioner.    Subjective:   Chief Complaint   Patient presents with    Consult     Initial visit with Medical Bariatrician.       Patient ID: Phoebe Colbert  is a 69 y.o. female with excess weight/obesity here to pursue weight management.  Previous notes and records have been reviewed.    Past Medical History:   Diagnosis Date    Anxiety     Body mass index 27.0-27.9, adult     Colon cancer screening     Colon polyp     Depression     GERD (gastroesophageal reflux disease)     takes tums occasionaly    GERD (gastroesophageal reflux disease)     Hyperlipidemia     Viral intestinal infection      Past Surgical History:   Procedure Laterality Date    CARPAL TUNNEL RELEASE Bilateral     CHOLECYSTECTOMY  08/2011    COLONOSCOPY      COLONOSCOPY N/A 10/18/2018    Procedure: COLONOSCOPY;  Surgeon: Faisal Miller MD;  Location: [facility];  Service: Gastroenterology    CYSTOSCOPY      NV CMBND ANTERPOST COLPORRAPHY W/CYSTO N/A 5/23/2016    Procedure: COLPORRHAPHY ANTERIOR POSTERIOR WITH GRAFT; ENTEROCELE REPAIR;  Surgeon: Micha Bryant MD;  Location: [facility];  Service: UroGynecology           NV COLONOSCOPY FLX DX W/COLLJ SPEC WHEN PFRMD N/A 10/17/2018    Procedure: COLONOSCOPY;  Surgeon: Faisal Miller MD;  Location: [facility];  Service: Gastroenterology    NV COLPOPEXY VAGINAL EXTRAPERITONEAL APPROACH N/A 5/23/2016    Procedure: COLPOPEXY VAGINAL EXTRAPERITONEAL (VEC);  Surgeon: Micha Bryant MD;  Location[facility];  Service: UroGynecology           NV CYSTOURETHROSCOPY N/A 5/23/2016    Procedure: CYSTOSCOPY;  Surgeon: Micha Bryant MD;  Location[facility];  Service: UroGynecology            KY SLING OPERATION STRESS INCONTINENCE N/A 5/23/2016    Procedure: INSERTION PUBOVAGINAL SLING ;  Surgeon: Micha Bryant MD;  Location: [facility];  Service: UroGynecology           TUBAL LIGATION         HPI:  Wt Readings from Last 20 Encounters:   04/16/24 74.8 kg (165 lb)   04/10/24 74.2 kg (163 lb 8 oz)   11/07/23 75.8 kg (167 lb)   08/31/23 76.2 kg (168 lb)   08/12/23 76.7 kg (169 lb)   08/03/23 76.7 kg (169 lb)   07/07/23 74.4 kg (164 lb)   05/23/23 74.8 kg (164 lb 14.5 oz)   05/09/23 75.3 kg (166 lb)   05/09/23 77.3 kg (170 lb 6.4 oz)   04/25/23 75.7 kg (166 lb 12.8 oz)   03/07/23 75.8 kg (167 lb)   12/07/22 74.8 kg (165 lb)   08/03/22 78 kg (172 lb)   05/18/22 74.4 kg (164 lb)   02/11/22 76.4 kg (168 lb 6.4 oz)   01/11/22 72.6 kg (160 lb)   10/08/21 77.2 kg (170 lb 3.2 oz)   09/17/21 77.1 kg (170 lb)   09/01/21 76.2 kg (168 lb)     Patient was referred to her PCP and presents today to medical weight management office for consult. Patient states her PCP suggested she meet with weight management to review dietary and lifestyle changes to help her improve her cholesterol and pre-diabetes. Patient is currently taking rosuvastatin for cholesterol which improved cholesterol levels. Patient states PCP suggest if she would loose 15-20 lbs it will help lower her cholesterol, and pre-diabetes levels. She currently calorie tracks and works out 3 times a week. She doesn't stress eat or graze, but notices a craving for sweets at night.  Patient did a group weight loss program a couple of years which helped her loose weight. She is currently looking for a program that will help her with accountability and give her some structure with her busy routine.       Obesity/Excess Weight:  Severity: Mild  Onset:  Within the last couple of years.     Modifiers: Diet and Exercise and group Weight loss Program   Contributing factors:  Dietary and workout routine   Associated symptoms: comorbid conditions    Diet recall:  B:  protein bar, cup of coffee with milk  S: chips  L: soup or a turkey sandwich  D: Chicken, fish, vegetables  S: Frozen yogurt bar    Hydration: water 4 bottles of 8 ounces   Alcohol: 1-2 drinks a week  Smoking: never  Exercise: 3 times a week, cardio 40 min on treadmill 15 strength training.  Occupation: Nurse   Sleep: 7 hours  STOP ban/8    Current weight: 165  Current BMI: 28.32  Current Waist Measurement: 37  Goal weight: 140-150    Colonoscopy:  as per patient WNL  Mammogram:      The following portions of the patient's history were reviewed and updated as appropriate: allergies, current medications, past family history, past medical history, past social history, past surgical history, and problem list.    Family History   Problem Relation Age of Onset    Endometrial cancer Mother 68    COPD Mother     Coronary artery disease Father     Heart disease Father     No Known Problems Sister     No Known Problems Sister     Hypertension Brother     No Known Problems Son     No Known Problems Daughter     No Known Problems Maternal Grandmother     No Known Problems Maternal Grandfather     No Known Problems Paternal Grandmother     No Known Problems Paternal Grandfather     No Known Problems Maternal Aunt     No Known Problems Maternal Aunt     No Known Problems Maternal Aunt     No Known Problems Maternal Aunt     No Known Problems Paternal Aunt         Review of Systems   Constitutional:  Negative for chills and fever.   HENT:  Negative for ear pain and sore throat.    Eyes:  Negative for pain and visual disturbance.   Respiratory:  Negative for cough and shortness of breath.    Cardiovascular:  Negative for chest pain and palpitations.   Gastrointestinal:  Positive for constipation (controlled takes colace prn.). Negative for abdominal pain and vomiting.   Genitourinary:  Negative for dysuria and hematuria.   Musculoskeletal:  Negative for arthralgias and back pain.   Skin:  Negative for color change and  "rash.   Neurological:  Negative for seizures and syncope.   Psychiatric/Behavioral:  Negative for behavioral problems and suicidal ideas. The patient is nervous/anxious (controlled.).    All other systems reviewed and are negative.      Objective:  /80 (BP Location: Left arm, Patient Position: Sitting, Cuff Size: Large)   Pulse 79   Resp 16   Ht 5' 4\" (1.626 m)   Wt 74.8 kg (165 lb)   BMI 28.32 kg/m²     Physical Exam  Constitutional:       Appearance: Normal appearance.   HENT:      Head: Normocephalic and atraumatic.   Cardiovascular:      Rate and Rhythm: Normal rate.   Pulmonary:      Effort: No respiratory distress.   Abdominal:      General: There is no distension.      Palpations: Abdomen is soft.   Musculoskeletal:         General: Normal range of motion.   Neurological:      Mental Status: She is alert and oriented to person, place, and time.      Motor: No weakness.   Psychiatric:         Mood and Affect: Mood normal.         Behavior: Behavior normal.         Thought Content: Thought content normal.         Judgment: Judgment normal.              Labs and Imaging  Recent labs and imaging have been personally reviewed.  Lab Results   Component Value Date    WBC 6.71 11/04/2023    HGB 14.8 11/04/2023    HCT 44.3 11/04/2023    MCV 88 11/04/2023     11/04/2023     Lab Results   Component Value Date    SODIUM 139 11/04/2023    K 4.0 11/04/2023     11/04/2023    CO2 27 11/04/2023    AGAP 9 11/04/2023    BUN 21 11/04/2023    CREATININE 0.81 11/04/2023    GLUC 92 07/08/2021    GLUF 93 11/04/2023    CALCIUM 9.8 11/04/2023    AST 21 11/04/2023    ALT 19 11/04/2023    ALKPHOS 69 11/04/2023    TP 7.1 11/04/2023    TBILI 0.95 11/04/2023    EGFR 75 11/04/2023     Lab Results   Component Value Date    HGBA1C 5.7 (H) 04/09/2024     Lab Results   Component Value Date    VWG8XFIACKZH 1.527 11/04/2023     Lab Results   Component Value Date    CHOLESTEROL 178 04/09/2024     Lab Results   Component " Value Date    HDL 65 04/09/2024     Lab Results   Component Value Date    TRIG 58 04/09/2024     Lab Results   Component Value Date    LDLCALC 101 (H) 04/09/2024

## 2024-04-17 NOTE — PROGRESS NOTES
Weight Management Medical Nutrition Assessment  Phoebe presented for a new start Healthy Core visit. Today's weight is 163.9#. Concerned about recent A1C and cholesterol labs. PCP recommending weight loss of 20# can help with this. Using [employer] sam to monitor dietary intake. Interested in new meal and snack ideas to assist with calorie control and adequate protein intake. Per dietary recall patient consumes inadequate protein with meals and snacks which likely contributes to rebound hunger during the evening. Usually craves sweets at this time. Will eat to eat not always due to hunger. Offered  visit to discuss eating triggers. Completed body composition test. Will review results at f/u visit. We developed and reviewed a low calorie meal plan.    Patient seen by Medical Provider in past 6 months:  yes, Dong Gamboa on 4/16/24  Requested to schedule appointment with Medical Provider: No      Anthropometric Measurements  Start Weight (#): 163.9#  Current Weight (#): 163.9#  TBW % Change from start weight: n/a  Ideal Body Weight (#): 120#  Goal Weight (#): 145#  Highest: 170#  Lowest: 150#    Weight Loss History  Previous weight loss attempts: Commercial Programs (Weight Watchers, Paige Baca, etc.)  Diet  Exercise    Food and Nutrition Related History  Wake up: 6-6:30AM   Bed Time: 9PM     Food Recall  Breakfast: 1 cup coffee with milk  9-10AM Greek yogurt + blueberries + almonds    Snack: skip  Lunch: 1PM 1 cup soup OR turkey burger  Snack: skip  Dinner: 5-6PM tuna sandwich with brussel sprouts  Snack: 8PM chocolate chip cookies (3) with glass of milk    Beverages: 32oz water, 1 cup coffee with 2% milk  Alcohol: 1-2 drinks per week (occasional mixed drink)   Volume of beverage intake: 40oz    Weekends: Same  Cravings: sweets  Trouble area of day: 8PM craves sweets, reports she isn't usually hungry at this time    Frequency of Eating out: once per week  Food restrictions: no allergies or intolerances, doesn't eat  much red meat  Cooking: self   Food Shopping: self    Physical Activity Intake  Activity: Cardio/treadmill (40 minutes), strength training (15 minutes) (if not at the gym walks 10,000 steps daily)  Frequency: 3 days per week  Physical limitations/barriers to exercise: n/a    Estimated Needs  Energy  SECA: BMR: 1334    [employer] [place] Energy Needs: BMR : 1263   1-2# loss weekly sedentary:  516-1016             1-2# loss weekly lightly active: [id number]  Maintenance calories for sedentary activity level: 1516  Protein: 65-82g      (1.2-1.5g/kg IBW)  Fluid: 64oz     (35mL/kg IBW)    Nutrition Diagnosis  Yes;    Overweight/obesity  related to Food and nutrition related knowledge deficit as evidenced by  BMI more than normative standard for age and sex (overweight 25-29.9)       Nutrition Intervention    Nutrition Prescription  Calories: 1,300 calories  Protein: 75-80g  Fluid: 64oz    Meal Plan (John/Pro/Carb)  Breakfast: 300/20-25  Snack:  Lunch: 300/20-25  Snack: 100-150/5-10  Dinner: 400/20-25  Snack: 100-150/5-10    Nutrition Education:    Healthy Core Manual  Calorie controlled menu  Lean protein food choices  Healthy snack options  Food journaling tips      Nutrition Counseling:  Strategies: meal planning, portion sizes, healthy snack choices, hydration, fiber intake, protein intake, exercise, food journal      Monitoring and Evaluation:  Evaluation criteria:  Energy Intake  Meet protein needs  Maintain adequate hydration  Monitor weekly weight  Meal planning/preparation  Food journal   Decreased portions at mealtimes and snacks  Physical activity     Barriers to learning:none  Readiness to change: Preparation:  (Getting ready to change)   Comprehension: very good  Expected Compliance: very good

## 2024-04-23 ENCOUNTER — SOCIAL WORK (OUTPATIENT)
Dept: BEHAVIORAL/MENTAL HEALTH CLINIC | Facility: CLINIC | Age: 69
End: 2024-04-23
Payer: COMMERCIAL

## 2024-04-23 DIAGNOSIS — F41.9 ANXIETY: Primary | ICD-10-CM

## 2024-04-23 PROCEDURE — 90834 PSYTX W PT 45 MINUTES: CPT | Performed by: SOCIAL WORKER

## 2024-04-24 ENCOUNTER — CLINICAL SUPPORT (OUTPATIENT)
Dept: BARIATRICS | Facility: CLINIC | Age: 69
End: 2024-04-24
Payer: COMMERCIAL

## 2024-04-24 ENCOUNTER — TELEPHONE (OUTPATIENT)
Age: 69
End: 2024-04-24

## 2024-04-24 VITALS — WEIGHT: 163.9 LBS | HEIGHT: 64 IN | BODY MASS INDEX: 27.98 KG/M2

## 2024-04-24 DIAGNOSIS — E66.3 OVERWEIGHT: ICD-10-CM

## 2024-04-24 PROCEDURE — 97802 MEDICAL NUTRITION INDIV IN: CPT

## 2024-04-24 PROCEDURE — RECHECK

## 2024-04-24 NOTE — PSYCH
"Behavioral Health Psychotherapy Progress Note    Psychotherapy Provided: Individual Psychotherapy     Encounter Diagnoses   Name Primary?   • Anxiety Yes       Goals addressed in session: Goal 1     DATA: Phoebe spoke about her feelings re: her upcoming trips both with her family and her close friend group.  She shared how much she enjoys spending time with both of these groups and how much it \"recharges\" her.   Stage of change for addressing substance use diagnoses: No substance use/Not applicable    ASSESSMENT:  Phoebe Colbert presents with a Euthymic/ normal mood. She has maintained a level of acceptance with her life, her children and her marriage, and is able to recognize the progress that she has made in letting go of anxiety / control issues.  However, she continues to rely on monthly sessions for validation and support. .  her affect is Normal range and intensity, which is congruent, with her mood and the content of the session. The client has made progress on their goals.     Phoebe Colbert presents with a minimal risk of suicide, minimal risk of self-harm, and none risk of harm to others.    For any risk assessment that surpasses a \"low\" rating, a safety plan must be developed.    A safety plan was indicated: no  If yes, describe in detail     PLAN: Between sessions, Phoebe Colbert will discuss with her  ways that they can share their concerns with each other and their respective therapists.  At the next session, the therapist will use Supportive Psychotherapy to address the above in further detail.    Behavioral Health Treatment Plan and Discharge Planning: Phoebe Colbert is aware of and agrees to continue to work on their treatment plan. They have identified and are working toward their discharge goals. yes    Visit start and stop times:    4/23/24      Start Time: 1700  Stop Time: 1750  Total Visit Time: 50 minutes  "

## 2024-04-30 NOTE — PROGRESS NOTES
Weight Management Medical Nutrition Assessment  Phoebe presented for a month 1 f/u Healthy Core visit. Today's weight is 163.4#. She lost 0.5# x 2 weeks. Reviewed SECA results. Using low calorie sweet snack suggestions to help with cravings. Protein intake improving. Continues to use Lose It sam. Calorie intake avg 1320 and protein avg 55-65g. Interest in using protein shakes to assist with reaching goal of 75g daily. States noticeable improvement in satisfaction/fullness. Hydration remains consistent. Reviewed fluid goal of 64oz daily.    Patient seen by Medical Provider in past 6 months:  yes, Dong Gamboa on 4/16/24  Requested to schedule appointment with Medical Provider: No      Anthropometric Measurements  Start Weight (#): 163.9#  Current Weight (#): 163.4#  TBW % Change from start weight: 0.4%  Ideal Body Weight (#): 120#  Goal Weight (#): 145#  Highest: 170#  Lowest: 150#    Weight Loss History  Previous weight loss attempts: Commercial Programs (Weight Watchers, Paige Baca, etc.)  Diet  Exercise    Food and Nutrition Related History  Wake up: 6-6:30AM   Bed Time: 9PM     Food Recall (updates in bold)  Breakfast: 1 cup coffee with milk  9-10AM Greek yogurt + blueberries + 100 calorie pack of almonds    Snack: rice cake OR 2 hard boiled eggs  Lunch: 1PM 2 slices 647 bread + 3 oz tuna or turkey burger + 1 Tbsp palma + vegetables  Snack: skip  Dinner: 5-6PM at least 4oz salmon or chicken breast + vegetables  Snack: 8PM 2 chocolate rice cakes + 1 Tbsp peanut butter    Beverages: 32oz water, 1 cup coffee with 2% milk  Alcohol: [place], [place] (recently on vacation)  Volume of beverage intake: 40oz    Weekends: Same  Cravings: sweets  Trouble area of day: 8PM craves sweets, reports she isn't usually hungry at this time    Frequency of Eating out: once per week (usually orders salmon)  Food restrictions: no allergies or intolerances, doesn't eat much red meat  Cooking: self   Food Shopping: self    Physical  Activity Intake  Activity: Cardio/treadmill (40 minutes), strength training (15 minutes) (if not at the gym walks 10,000 steps daily)  Frequency: 3 days per week  Physical limitations/barriers to exercise: n/a    Estimated Needs  Energy  SECA: BMR: 1334    [employer] [place] Energy Needs: BMR : 1263   1-2# loss weekly sedentary:  516-1116             1-2# loss weekly lightly active: [id number]  Maintenance calories for sedentary activity level: 1516  Protein: 65-82g      (1.2-1.5g/kg IBW)  Fluid: 64oz     (35mL/kg IBW)    Nutrition Diagnosis  Yes;    Overweight/obesity  related to Food and nutrition related knowledge deficit as evidenced by  BMI more than normative standard for age and sex (overweight 25-29.9)       Nutrition Intervention    Nutrition Prescription  Calories: 1,300 calories (discussed option of 1200 calories daily)  Protein: 75-80g  Fluid: 64oz    Meal Plan (John/Pro/Carb)  Breakfast: 300/20-25  Snack:  Lunch: 300/20-25  Snack: 100-150/5-10  Dinner: 400/20-25  Snack: 100-150/5-10    Nutrition Education:    Healthy Core Manual  Calorie controlled menu  Lean protein food choices  Healthy snack options  Food journaling tips      Nutrition Counseling:  Strategies: meal planning, portion sizes, healthy snack choices, hydration, fiber intake, protein intake, exercise, food journal      Monitoring and Evaluation:  Evaluation criteria:  Energy Intake  Meet protein needs  Maintain adequate hydration  Monitor weekly weight  Meal planning/preparation  Food journal   Decreased portions at mealtimes and snacks  Physical activity     Barriers to learning:none  Readiness to change: Action:  (Changing behavior)  Comprehension: very good  Expected Compliance: very good

## 2024-05-09 ENCOUNTER — CLINICAL SUPPORT (OUTPATIENT)
Dept: BARIATRICS | Facility: CLINIC | Age: 69
End: 2024-05-09

## 2024-05-09 VITALS — BODY MASS INDEX: 27.9 KG/M2 | WEIGHT: 163.4 LBS | HEIGHT: 64 IN

## 2024-05-09 VITALS — WEIGHT: 163.4 LBS | BODY MASS INDEX: 27.9 KG/M2 | HEIGHT: 64 IN

## 2024-05-09 DIAGNOSIS — R63.5 ABNORMAL WEIGHT GAIN: Primary | ICD-10-CM

## 2024-05-09 PROCEDURE — RECHECK

## 2024-05-13 ENCOUNTER — TELEPHONE (OUTPATIENT)
Age: 69
End: 2024-05-13

## 2024-05-14 ENCOUNTER — SOCIAL WORK (OUTPATIENT)
Dept: BEHAVIORAL/MENTAL HEALTH CLINIC | Facility: CLINIC | Age: 69
End: 2024-05-14

## 2024-05-14 DIAGNOSIS — F41.9 ANXIETY: Primary | ICD-10-CM

## 2024-05-15 NOTE — PSYCH
"Behavioral Health Psychotherapy Progress Note    Psychotherapy Provided: Individual Psychotherapy     Encounter Diagnoses   Name Primary?   • Anxiety Yes         Goals addressed in session: Goal 1     DATA: Phoebe spoke about her feelings re: her adult relationships with both of her children and her pride in the successful individuals that they have become.  She shared ways that she \"wishes\" they would interact with their father while also acknowledging her lack of control of this. Stage of change for addressing substance use diagnoses: No substance use/Not applicable    ASSESSMENT:  Phoebe Colbert presents with a Euthymic/ normal mood. She has maintained a level of acceptance with her life, her children and her marriage, and is able to recognize the progress that she has made in letting go of anxiety / control issues.  However, she continues to rely on monthly sessions for validation and support. .  her affect is Normal range and intensity, which is congruent, with her mood and the content of the session. The client has made progress on their goals.     Phoebe Colbert presents with a minimal risk of suicide, minimal risk of self-harm, and none risk of harm to others.    For any risk assessment that surpasses a \"low\" rating, a safety plan must be developed.    A safety plan was indicated: no  If yes, describe in detail     PLAN: Between sessions, Phoebe Colbert will discuss with her  ways that they can share their concerns with each other and their respective therapists.  At the next session, the therapist will use Supportive Psychotherapy to address the above in further detail.    Behavioral Health Treatment Plan and Discharge Planning: Phoebe Colbert is aware of and agrees to continue to work on their treatment plan. They have identified and are working toward their discharge goals. yes    Visit start and stop times:    5/14/24      Start Time: 1600  Stop Time: 1653  Total Visit Time: 53 minutes  "

## 2024-05-16 ENCOUNTER — CLINICAL SUPPORT (OUTPATIENT)
Dept: BARIATRICS | Facility: CLINIC | Age: 69
End: 2024-05-16

## 2024-05-16 VITALS — WEIGHT: 162.8 LBS | BODY MASS INDEX: 27.79 KG/M2 | HEIGHT: 64 IN

## 2024-05-16 DIAGNOSIS — R63.5 ABNORMAL WEIGHT GAIN: Primary | ICD-10-CM

## 2024-05-16 PROCEDURE — RECHECK

## 2024-05-23 ENCOUNTER — CLINICAL SUPPORT (OUTPATIENT)
Dept: BARIATRICS | Facility: CLINIC | Age: 69
End: 2024-05-23

## 2024-05-23 VITALS — HEIGHT: 64 IN | BODY MASS INDEX: 27.66 KG/M2 | WEIGHT: 162 LBS

## 2024-05-23 DIAGNOSIS — R63.5 ABNORMAL WEIGHT GAIN: Primary | ICD-10-CM

## 2024-05-23 PROCEDURE — RECHECK

## 2024-05-30 ENCOUNTER — CLINICAL SUPPORT (OUTPATIENT)
Dept: BARIATRICS | Facility: CLINIC | Age: 69
End: 2024-05-30

## 2024-05-30 VITALS — WEIGHT: 161 LBS | HEIGHT: 64 IN | BODY MASS INDEX: 27.49 KG/M2

## 2024-05-30 DIAGNOSIS — R63.5 ABNORMAL WEIGHT GAIN: Primary | ICD-10-CM

## 2024-05-30 PROCEDURE — RECHECK

## 2024-05-30 NOTE — PROGRESS NOTES
H/C:  [facility]  Provider: Gabby  Tracking: Lose it  Eating Triggers?  MH: Anxiety Rx  Therapist: Rachele Webber / monthly  Work: Nurse @ [employer]  Activity: gym / steps

## 2024-06-03 NOTE — PROGRESS NOTES
Weight Management Medical Nutrition Assessment  Phoebe presented for a month 2 f/u [facility] visit. Today's weight is 161#. She lost 2.4# x 4 weeks. States her home scale reported 158# yesterday morning. RD explained weight changes over the course of the day as today's weigh in was during afternoon. Water intake at goal. Really enjoying the classes. Now reads food labels and finds this helpful as she wants to know what ingredients are in the foods she eats. Has established a routine and it's working for her. She is working with a  through [employer] to build core strength. Discussed increasing calories when active. Recommended 1400-1500kcal.     Keep up the great work!    Patient seen by Medical Provider in past 6 months:  yes, Dong Gamboa on 4/16/24  Requested to schedule appointment with Medical Provider: No      Anthropometric Measurements  Start Weight (#): 163.9#  Current Weight (#): 161#  TBW % Change from start weight: 1.8%  Ideal Body Weight (#): 120#  Goal Weight (#): 145#  Highest: 170#  Lowest: 150#    Weight Loss History  Previous weight loss attempts: Commercial Programs (Weight Watchers, Paige Keven, etc.)  Diet  Exercise    Food and Nutrition Related History  Wake up: 6-6:30AM   Bed Time: 9PM     Food Recall (updates in bold)  Breakfast: 1 cup coffee with milk  9-10AM Greek yogurt + blueberries + 100 calorie pack of almonds OR Fairlife high protein shake    Snack: rice cake OR 2 hard boiled eggs  Lunch: 1PM 2 slices 647 bread + 3 oz tuna or turkey burger + 1 Tbsp palma + vegetables  Snack: skip  Dinner: 5-6PM at least 4oz salmon or chicken breast + vegetables OR Lean Cuisine + veggies  Snack: 8PM 2 chocolate rice cakes + 1 Tbsp peanut butter    Beverages: 64oz water, 1 cup coffee with 2% milk  Alcohol: Summer nayan (2 beers)  Volume of beverage intake: 40oz    Weekends: Same  Cravings: sweets  Trouble area of day: 8PM craves sweets, reports she isn't usually hungry at this  time    Frequency of Eating out: once per week (usually orders salmon)  Food restrictions: no allergies or intolerances, doesn't eat much red meat  Cooking: self   Food Shopping: self    Physical Activity Intake  Activity: Cardio/treadmill (40 minutes), strength training (15 minutes) (if not at the gym walks 10,000 steps daily)  Frequency: 3 days per week  Physical limitations/barriers to exercise: n/a    Estimated Needs   Energy  SECA: BMR: 1334    [place] Energy Needs: BMR : 1263   1-2# loss weekly sedentary:  516-1116             1-2# loss weekly lightly active: 737-1237  Maintenance calories for sedentary activity level: 1516  Protein: 65-82g      (1.2-1.5g/kg IBW)  Fluid: 64oz     (35mL/kg IBW)    Nutrition Diagnosis  Yes;    Overweight/obesity  related to Food and nutrition related knowledge deficit as evidenced by  BMI more than normative standard for age and sex (overweight 25-29.9)       Nutrition Intervention    Nutrition Prescription  Calories: 1,200-1400 (flex up when active)  Protein: 75-80g  Fluid: 64oz    Meal Plan (John/Pro/Carb)  Breakfast: 300/20-25  Snack:  Lunch: 300/20-25  Snack: 100-150/5-10  Dinner: 300-400/20-25  Snack: 100-150/5-10    Nutrition Education:    [facility] Manual  Calorie controlled menu  Lean protein food choices  Healthy snack options  Food journaling tips      Nutrition Counseling:  Strategies: meal planning, portion sizes, healthy snack choices, hydration, fiber intake, protein intake, exercise, food journal      Monitoring and Evaluation:  Evaluation criteria:  Energy Intake  Meet protein needs  Maintain adequate hydration  Monitor weekly weight  Meal planning/preparation  Food journal   Decreased portions at mealtimes and snacks  Physical activity     Barriers to learning:none  Readiness to change: Action:  (Changing behavior)  Comprehension: very good  Expected Compliance: very good

## 2024-06-06 ENCOUNTER — CLINICAL SUPPORT (OUTPATIENT)
Dept: BARIATRICS | Facility: CLINIC | Age: 69
End: 2024-06-06

## 2024-06-06 VITALS — BODY MASS INDEX: 27.31 KG/M2 | HEIGHT: 64 IN | WEIGHT: 160 LBS

## 2024-06-06 DIAGNOSIS — R63.5 ABNORMAL WEIGHT GAIN: Primary | ICD-10-CM

## 2024-06-06 PROCEDURE — RECHECK

## 2024-06-10 ENCOUNTER — CLINICAL SUPPORT (OUTPATIENT)
Dept: BARIATRICS | Facility: CLINIC | Age: 69
End: 2024-06-10

## 2024-06-10 VITALS — HEIGHT: 64 IN | BODY MASS INDEX: 27.49 KG/M2 | WEIGHT: 161 LBS

## 2024-06-10 DIAGNOSIS — R63.5 ABNORMAL WEIGHT GAIN: Primary | ICD-10-CM

## 2024-06-10 PROCEDURE — RECHECK

## 2024-06-18 ENCOUNTER — SOCIAL WORK (OUTPATIENT)
Dept: BEHAVIORAL/MENTAL HEALTH CLINIC | Facility: CLINIC | Age: 69
End: 2024-06-18
Payer: COMMERCIAL

## 2024-06-18 DIAGNOSIS — F41.9 ANXIETY: Primary | ICD-10-CM

## 2024-06-18 PROCEDURE — 90837 PSYTX W PT 60 MINUTES: CPT | Performed by: SOCIAL WORKER

## 2024-06-19 ENCOUNTER — OFFICE VISIT (OUTPATIENT)
Dept: INTERNAL MEDICINE CLINIC | Facility: CLINIC | Age: 69
End: 2024-06-19
Payer: COMMERCIAL

## 2024-06-19 VITALS
WEIGHT: 161 LBS | TEMPERATURE: 98 F | HEART RATE: 69 BPM | BODY MASS INDEX: 27.49 KG/M2 | DIASTOLIC BLOOD PRESSURE: 68 MMHG | SYSTOLIC BLOOD PRESSURE: 119 MMHG | HEIGHT: 64 IN | OXYGEN SATURATION: 98 %

## 2024-06-19 DIAGNOSIS — M85.88 OSTEOPENIA OF LUMBAR SPINE: ICD-10-CM

## 2024-06-19 DIAGNOSIS — L23.7 ALLERGIC CONTACT DERMATITIS DUE TO PLANTS, EXCEPT FOOD: Primary | ICD-10-CM

## 2024-06-19 DIAGNOSIS — E78.00 PURE HYPERCHOLESTEROLEMIA: ICD-10-CM

## 2024-06-19 DIAGNOSIS — Z12.31 ENCOUNTER FOR SCREENING MAMMOGRAM FOR BREAST CANCER: ICD-10-CM

## 2024-06-19 PROCEDURE — 99214 OFFICE O/P EST MOD 30 MIN: CPT | Performed by: INTERNAL MEDICINE

## 2024-06-19 RX ORDER — METHYLPREDNISOLONE 4 MG/1
TABLET ORAL
Qty: 21 EACH | Refills: 0 | Status: SHIPPED | OUTPATIENT
Start: 2024-06-19

## 2024-06-19 RX ORDER — TRIAMCINOLONE ACETONIDE 1 MG/G
CREAM TOPICAL 2 TIMES DAILY
Qty: 60 G | Refills: 0 | Status: SHIPPED | OUTPATIENT
Start: 2024-06-19

## 2024-06-19 NOTE — PROGRESS NOTES
Assessment/Plan:           1. Allergic contact dermatitis due to plants, except food  Comments:  Medrol Dosepak was prescribed and topical triamcinolone.  Orders:  -     methylPREDNISolone 4 MG tablet therapy pack; Use as directed on package  -     triamcinolone (KENALOG) 0.1 % cream; Apply topically 2 (two) times a day  2. Encounter for screening mammogram for breast cancer  -     Mammo screening bilateral w 3d & cad; Future; Expected date: 06/19/2024  3. Pure hypercholesterolemia  Comments:  Following with cardiology and taking rosuvastatin 40 mg daily.  4. Osteopenia of lumbar spine  Comments:  Continue calcium and vitamin D and continue to monitor.         1. Allergic contact dermatitis due to plants, except food    - methylPREDNISolone 4 MG tablet therapy pack; Use as directed on package  Dispense: 21 each; Refill: 0  - triamcinolone (KENALOG) 0.1 % cream; Apply topically 2 (two) times a day  Dispense: 60 g; Refill: 0    2. Encounter for screening mammogram for breast cancer    - Mammo screening bilateral w 3d & cad; Future       No problem-specific Assessment & Plan notes found for this encounter.           Subjective:      Patient ID: Phoebe Colbert is a 69 y.o. female.    HPI    The following portions of the patient's history were reviewed and updated as appropriate: She  has a past medical history of Anxiety, Body mass index 27.0-27.9, adult, Colon cancer screening, Colon polyp, Depression, GERD (gastroesophageal reflux disease), GERD (gastroesophageal reflux disease), Hyperlipidemia, and Viral intestinal infection.  She   Patient Active Problem List    Diagnosis Date Noted    Overweight 04/16/2024    Chronic idiopathic constipation 02/11/2022    History of colon polyps 02/11/2022    Hiatal hernia 09/17/2021    Pure hypercholesterolemia 08/17/2021    GERD (gastroesophageal reflux disease)     Anxiety 10/18/2018    Lichen sclerosus 08/24/2017     She  has a past surgical history that includes Cholecystectomy  (08/2011); Carpal tunnel release (Bilateral); Tubal ligation; Colonoscopy; Cystoscopy; pr colpopexy vaginal extraperitoneal approach (N/A, 5/23/2016); pr cmbnd anterpost colporraphy w/cysto (N/A, 5/23/2016); pr sling operation stress incontinence (N/A, 5/23/2016); pr cystourethroscopy (N/A, 5/23/2016); pr colonoscopy flx dx w/collj spec when pfrmd (N/A, 10/17/2018); and Colonoscopy (N/A, 10/18/2018).  Her family history includes COPD in her mother; Coronary artery disease in her father; Endometrial cancer (age of onset: 68) in her mother; Heart disease in her father; Hypertension in her brother; No Known Problems in her daughter, maternal aunt, maternal aunt, maternal aunt, maternal aunt, maternal grandfather, maternal grandmother, paternal aunt, paternal grandfather, paternal grandmother, sister, sister, and son.  She  reports that she has never smoked. She has never used smokeless tobacco. She reports current alcohol use of about 1.0 standard drink of alcohol per week. She reports that she does not use drugs.  Current Outpatient Medications   Medication Sig Dispense Refill    aspirin (ECOTRIN LOW STRENGTH) 81 mg EC tablet Take 1 tablet (81 mg total) by mouth daily 30 tablet 0    bisacodyl (DULCOLAX) 5 mg EC tablet Take 1 tablet (5 mg total) by mouth daily as needed for constipation 2 tablet 0    Calcium Carbonate-Vitamin D (CALCIUM 500/D PO) Take by mouth      Cholecalciferol (VITAMIN D) 2000 units CAPS Take by mouth      docusate sodium (COLACE) 100 mg capsule Take 100 mg by mouth in the morning      famotidine (PEPCID) 20 mg tablet Take 1 tablet (20 mg total) by mouth 2 (two) times a day 60 tablet 2    methylPREDNISolone 4 MG tablet therapy pack Use as directed on package 21 each 0    Multiple Vitamins-Minerals (MULTIVITAL-M PO) Take by mouth      rosuvastatin (CRESTOR) 40 MG tablet Take 1 tablet (40 mg total) by mouth daily 90 tablet 3    sertraline (ZOLOFT) 50 mg tablet Take 1 tablet (50 mg total) by mouth  daily 90 tablet 3    triamcinolone (KENALOG) 0.1 % cream Apply topically 2 (two) times a day 60 g 0     No current facility-administered medications for this visit.     Current Outpatient Medications on File Prior to Visit   Medication Sig    aspirin (ECOTRIN LOW STRENGTH) 81 mg EC tablet Take 1 tablet (81 mg total) by mouth daily    bisacodyl (DULCOLAX) 5 mg EC tablet Take 1 tablet (5 mg total) by mouth daily as needed for constipation    Calcium Carbonate-Vitamin D (CALCIUM 500/D PO) Take by mouth    Cholecalciferol (VITAMIN D) 2000 units CAPS Take by mouth    docusate sodium (COLACE) 100 mg capsule Take 100 mg by mouth in the morning    famotidine (PEPCID) 20 mg tablet Take 1 tablet (20 mg total) by mouth 2 (two) times a day    Multiple Vitamins-Minerals (MULTIVITAL-M PO) Take by mouth    rosuvastatin (CRESTOR) 40 MG tablet Take 1 tablet (40 mg total) by mouth daily    sertraline (ZOLOFT) 50 mg tablet Take 1 tablet (50 mg total) by mouth daily     No current facility-administered medications on file prior to visit.     There are no discontinued medications.   She is allergic to levaquin [levofloxacin] and quinolones..    Review of Systems   Constitutional:  Negative for appetite change, chills, fatigue and fever.   HENT:  Negative for sore throat and trouble swallowing.    Eyes:  Negative for redness.   Respiratory:  Negative for shortness of breath.    Cardiovascular:  Negative for chest pain and palpitations.   Gastrointestinal:  Negative for abdominal pain, constipation and diarrhea.   Genitourinary:  Negative for dysuria and hematuria.   Musculoskeletal:  Negative for back pain and neck pain.   Skin:  Positive for rash.   Neurological:  Negative for seizures, weakness and headaches.   Hematological:  Negative for adenopathy.   Psychiatric/Behavioral:  Negative for confusion. The patient is not nervous/anxious.          Objective:      /68 (BP Location: Left arm, Patient Position: Sitting, Cuff Size:  "Standard)   Pulse 69   Temp 98 °F (36.7 °C) (Temporal)   Ht 5' 4\" (1.626 m)   Wt 73 kg (161 lb)   SpO2 98%   BMI 27.64 kg/m²     Results Reviewed       None            No results found for this or any previous visit (from the past 1344 hour(s)).     Physical Exam  Constitutional:       General: She is not in acute distress.     Appearance: Normal appearance.   HENT:      Head: Normocephalic and atraumatic.      Nose: Nose normal.      Mouth/Throat:      Mouth: Mucous membranes are moist.   Eyes:      Extraocular Movements: Extraocular movements intact.      Pupils: Pupils are equal, round, and reactive to light.   Cardiovascular:      Rate and Rhythm: Normal rate and regular rhythm.      Pulses: Normal pulses.      Heart sounds: Normal heart sounds. No murmur heard.     No friction rub.   Pulmonary:      Effort: Pulmonary effort is normal. No respiratory distress.      Breath sounds: Normal breath sounds. No wheezing.   Abdominal:      General: Abdomen is flat. Bowel sounds are normal. There is no distension.      Palpations: Abdomen is soft. There is no mass.      Tenderness: There is no abdominal tenderness. There is no guarding.   Musculoskeletal:         General: Normal range of motion.      Cervical back: Normal range of motion.   Skin:     Findings: Rash present.   Neurological:      General: No focal deficit present.      Mental Status: She is alert and oriented to person, place, and time. Mental status is at baseline.      Cranial Nerves: No cranial nerve deficit.   Psychiatric:         Mood and Affect: Mood normal.         Behavior: Behavior normal.         "

## 2024-06-19 NOTE — BH CRISIS PLAN
Client Name: Phoebe Colbert       Client YOB: 1955  : 1955    Treatment Team (include name and contact information):     Psychotherapist:Rachele Webber    Healthcare Provider  Chip Awad MD  [address] [address]  [place] [address]  [phone]    Type of Plan   * Child plans (children 14 yo and younger) must be completed and signed by the child's legal guardian   * Plans for all individuals 13 yo and above must be signed by the client.     Plan Type: adolescent/adult (14 and over) Initial      My Personal Strengths are (in the client's own words):  My profession, I am so proud of how my kids navigate this world.     The stressors and triggers that may put me at risk are:  other (describe) my 's struggles    Coping skills I can use to keep myself calm and safe:  Other (describe) Exercise, music, time with friends    Coping skills/supports I can use to maintain abstinence from substance use:       The people that provide me with help and support: (Include name, contact, and how they can help)   Support person #1: Dee Dee    * Phone number:     * How can they help me? She listens without judgement   Support person #2:Celina    * Phone number:     * How can they help me? She is honest with me and is very loving.      In the past, the following has helped me in times of crisis:    Calling a friend and Other: Doing things for others      If it is an emergency and you need immediate help, call     If there is a possibility of danger to yourself or others, call the following crisis hotline resources:     Adult Crisis Numbers  Suicide Prevention Hotline - Dial   [facility]: [phone]  [facility]: [phone]  [facility]: [phone]  [facility]: [phone]  [place]: [phone]  [facility]: [phone]  [facility]: [phone]  [facility]: [phone] (daytime).       [phone] (after  hours, weekends, holidays)     Child/Adolescent Crisis Numbers   [facility]: [phone]   [facility]: [phone]   [place], [place]: [phone]   [facility]: [phone]    Please note: Some [facility] do not have a separate number for Child/Adolescent specific crisis. If your county is not listed under Child/Adolescent, please call the adult number for your county     National Talk to Text Line   All Ages - [phone]    In the event your feelings become unmanageable, and you cannot reach your support system, you will call 911 immediately or go to the nearest hospital emergency room.

## 2024-06-19 NOTE — PSYCH
"Behavioral Health Psychotherapy Progress Note    Psychotherapy Provided: Individual Psychotherapy     Encounter Diagnoses   Name Primary?   • Anxiety Yes         Goals addressed in session: Goal 1     DATA: Phoebe spoke about her feelings re: her 's recent increased kane and her concern for the impact that this is having on his relationship with his two adult children.  She vocalized how frustrated she becomes with his inability to accept responsibility for his behaviors.  Stage of change for addressing substance use diagnoses: No substance use/Not applicable    ASSESSMENT:  Phoebe Colbert presents with a Euthymic/ normal mood. She demonstrated less defensiveness for her  which is indicative of her growth through therapy.  her affect is Normal range and intensity, which is congruent, with her mood and the content of the session. The client has made progress on their goals.     Phoebe Colbert presents with a minimal risk of suicide, minimal risk of self-harm, and none risk of harm to others.    For any risk assessment that surpasses a \"low\" rating, a safety plan must be developed.    A safety plan was indicated: no  If yes, describe in detail     PLAN: Between sessions, Phoebe Colbert will discuss with her  ways that they can share their concerns with each other and their respective therapists.  At the next session, the therapist will use Supportive Psychotherapy to address the above in further detail.    Behavioral Health Treatment Plan and Discharge Planning: Phoebe Colbert is aware of and agrees to continue to work on their treatment plan. They have identified and are working toward their discharge goals. yes    Visit start and stop times:    6/18/24      Start Time: 1600  Stop Time: 1655  Total Visit Time: 55 minutes  "

## 2024-06-20 ENCOUNTER — CLINICAL SUPPORT (OUTPATIENT)
Dept: BARIATRICS | Facility: CLINIC | Age: 69
End: 2024-06-20

## 2024-06-20 VITALS — WEIGHT: 163 LBS | HEIGHT: 64 IN | BODY MASS INDEX: 27.83 KG/M2

## 2024-06-20 DIAGNOSIS — R63.5 ABNORMAL WEIGHT GAIN: Primary | ICD-10-CM

## 2024-06-20 PROCEDURE — RECHECK

## 2024-06-23 ENCOUNTER — OFFICE VISIT (OUTPATIENT)
Dept: URGENT CARE | Age: 69
End: 2024-06-23
Payer: COMMERCIAL

## 2024-06-23 VITALS
WEIGHT: 161 LBS | RESPIRATION RATE: 18 BRPM | BODY MASS INDEX: 27.49 KG/M2 | TEMPERATURE: 98.2 F | DIASTOLIC BLOOD PRESSURE: 79 MMHG | HEIGHT: 64 IN | HEART RATE: 68 BPM | SYSTOLIC BLOOD PRESSURE: 119 MMHG | OXYGEN SATURATION: 95 %

## 2024-06-23 DIAGNOSIS — L25.5 CONTACT DERMATITIS DUE TO PLANTS, EXCEPT FOOD, UNSPECIFIED CONTACT DERMATITIS TYPE: ICD-10-CM

## 2024-06-23 DIAGNOSIS — L03.313 CELLULITIS OF CHEST WALL: Primary | ICD-10-CM

## 2024-06-23 LAB — GLUCOSE SERPL-MCNC: 101 MG/DL (ref 65–140)

## 2024-06-23 PROCEDURE — 82948 REAGENT STRIP/BLOOD GLUCOSE: CPT

## 2024-06-23 PROCEDURE — 99213 OFFICE O/P EST LOW 20 MIN: CPT

## 2024-06-23 RX ORDER — CEPHALEXIN 500 MG/1
500 CAPSULE ORAL EVERY 6 HOURS SCHEDULED
Qty: 28 CAPSULE | Refills: 0 | Status: SHIPPED | OUTPATIENT
Start: 2024-06-23 | End: 2024-06-30

## 2024-06-23 RX ORDER — METHYLPREDNISOLONE SODIUM SUCCINATE 125 MG/2ML
125 INJECTION, POWDER, LYOPHILIZED, FOR SOLUTION INTRAMUSCULAR; INTRAVENOUS ONCE
Status: COMPLETED | OUTPATIENT
Start: 2024-06-23 | End: 2024-06-23

## 2024-06-23 RX ADMIN — METHYLPREDNISOLONE SODIUM SUCCINATE 125 MG: 125 INJECTION, POWDER, LYOPHILIZED, FOR SOLUTION INTRAMUSCULAR; INTRAVENOUS at 14:14

## 2024-06-23 NOTE — PATIENT INSTRUCTIONS
We gave you a shot of Solumedrol in office today.   Continue  Medrol dose pack as prescribed.   Do not take Ibuprofen while on steroids.   When steroids are completed you make take Ibuprofen for pain/discomfort.  Take Tylenol as needed for pain/discomfort.          Skin marked with skin marker.   Monitor site for increased redness outside of the marked area, change in rash, increased swelling, or drainage.     If this develops or you develop any fever, chills, aches, joint pain, swelling, headache, dizziness, numbness or any new or concerning symptoms please proceed to ER.    Take antibiotics as prescribed.   Take entire course of antibiotics.     Eat yogurt with live and active cultures and/or take a probiotic at least 3 hours before or after antibiotic dose.   Monitor stool for diarrhea and/or blood. If this occurs, contact primary care doctor ASAP.     Good hand hygiene  Avoid scratching area  Keep area clean and dry    Watch for signs of increased infection as previously discussed    If you develop any facial swelling, shortness of breath, difficulty breathing or any new or concerning symptoms please return or proceed ER.     Follow up with PCP in 3-5 days.

## 2024-06-23 NOTE — PROGRESS NOTES
[facility]        NAME: Phoebe Colbert is a 69 y.o. female  : 1955    MRN: [id number]  DATE: 2024  TIME: 3:22 PM    Assessment and Plan   Cellulitis of chest wall [L03.313]  1. Cellulitis of chest wall  cephalexin (KEFLEX) 500 mg capsule      2. Contact dermatitis due to plants, except food, unspecified contact dermatitis type  methylPREDNISolone sodium succinate (Solu-MEDROL) injection 125 mg        Solumedrol in office today given in left upper arm.  Pt had dizziness, nausea consistent with a vagal response following injection in office. Pt able to be laid supine on exam table, no LOS, No falls.  Pt reports feeling sharp pain in her left upper arm causing her to feel dizzy and nauseous, denies flushing sensation.  She denies HA, CP, or SOB.  VSS /84, HR 65, RR 18 Sat 100% on RA, temp 98.0F BS 101mg/dl.  Pt ate and tolerated livia crackers and apple juice.      Recheck of pt: VSS /86 HR 68, RR 18, O2 Sat 100% Temp 97.8F.  Skin warm and dry.  Pt A&Ox4, denies dizziness, nausea, or arm pain.  Pt able to ambulate with steady gait, tolerated POs.      Continue  Medrol dose pack as prescribed.   Do not take Ibuprofen while on steroids.   When steroids are completed you make take Ibuprofen for pain/discomfort.  Take Tylenol as needed for pain/discomfort.          Skin marked with skin marker.   Monitor site for increased redness outside of the marked area, change in rash, increased swelling, or drainage.     If this develops or you develop any fever, chills, aches, joint pain, swelling, headache, dizziness, numbness or any new or concerning symptoms please proceed to ER.    Take antibiotics as prescribed.   Take entire course of antibiotics.     Eat yogurt with live and active cultures and/or take a probiotic at least 3 hours before or after antibiotic dose.   Monitor stool for diarrhea and/or blood. If this occurs, contact primary care doctor ASAP.     Good hand hygiene  Avoid  scratching area  Keep area clean and dry    Watch for signs of increased infection as previously discussed    If you develop any facial swelling, shortness of breath, difficulty breathing or any new or concerning symptoms please return or proceed ER.     Follow up with PCP in 3-5 days.        Patient Instructions       Follow up with PCP in 3-5 days.  Proceed to  ER if symptoms worsen.    If tests have been performed at [facility], our office will contact you with results if changes need to be made to the care plan discussed with you at the visit.  You can review your full results on [facility].    Chief Complaint     Chief Complaint   Patient presents with   • Rash     Pt presents with widespread rash to right flank. Received predisone pack; almost completed and triamcinolone cream. Worsening and spreading. Itching, denies burning.          History of Present Illness       Pt is a 69 year old female presenting with rash to right side flank and chest which start 5 days.  She has started a medrol dose pack, day 5 of 6 of medrol dose pack with worsening symptoms.  She states she was exposed to poison ivy 7 days ago.  Has taken benadryl for the itching and Kenalog without relief.  She has had a history of shingles.     Rash  Pertinent negatives include no fever.       Review of Systems   Review of Systems   Constitutional:  Positive for chills. Negative for fever.   Skin:  Positive for rash and wound.         Current Medications       Current Outpatient Medications:   •  aspirin (ECOTRIN LOW STRENGTH) 81 mg EC tablet, Take 1 tablet (81 mg total) by mouth daily, Disp: 30 tablet, Rfl: 0  •  bisacodyl (DULCOLAX) 5 mg EC tablet, Take 1 tablet (5 mg total) by mouth daily as needed for constipation, Disp: 2 tablet, Rfl: 0  •  Calcium Carbonate-Vitamin D (CALCIUM 500/D PO), Take by mouth, Disp: , Rfl:   •  cephalexin (KEFLEX) 500 mg capsule, Take 1 capsule (500 mg total) by mouth every 6 (six) hours for 7 days, Disp: 28  capsule, Rfl: 0  •  Cholecalciferol (VITAMIN D) 2000 units CAPS, Take by mouth, Disp: , Rfl:   •  docusate sodium (COLACE) 100 mg capsule, Take 100 mg by mouth in the morning, Disp: , Rfl:   •  famotidine (PEPCID) 20 mg tablet, Take 1 tablet (20 mg total) by mouth 2 (two) times a day, Disp: 60 tablet, Rfl: 2  •  methylPREDNISolone 4 MG tablet therapy pack, Use as directed on package, Disp: 21 each, Rfl: 0  •  Multiple Vitamins-Minerals (MULTIVITAL-M PO), Take by mouth, Disp: , Rfl:   •  rosuvastatin (CRESTOR) 40 MG tablet, Take 1 tablet (40 mg total) by mouth daily, Disp: 90 tablet, Rfl: 3  •  sertraline (ZOLOFT) 50 mg tablet, Take 1 tablet (50 mg total) by mouth daily, Disp: 90 tablet, Rfl: 3  •  triamcinolone (KENALOG) 0.1 % cream, Apply topically 2 (two) times a day, Disp: 60 g, Rfl: 0  No current facility-administered medications for this visit.    Current Allergies     Allergies as of 06/23/2024 - Reviewed 06/23/2024   Allergen Reaction Noted   • Levaquin [levofloxacin] Swelling and Other (See Comments) 05/20/2016   • Quinolones Other (See Comments) 05/20/2016            The following portions of the patient's history were reviewed and updated as appropriate: allergies, current medications, past family history, past medical history, past social history, past surgical history and problem list.     Past Medical History:   Diagnosis Date   • Anxiety    • Body mass index 27.0-27.9, adult    • Colon cancer screening    • Colon polyp    • Depression    • GERD (gastroesophageal reflux disease)     takes tums occasionaly   • GERD (gastroesophageal reflux disease)    • Hyperlipidemia    • Viral intestinal infection        Past Surgical History:   Procedure Laterality Date   • CARPAL TUNNEL RELEASE Bilateral    • CHOLECYSTECTOMY  08/2011   • COLONOSCOPY     • COLONOSCOPY N/A 10/18/2018    Procedure: COLONOSCOPY;  Surgeon: Faisal Miller MD;  Location: [facility];  Service: Gastroenterology   • CYSTOSCOPY     • [facility] CMBND  "ANTERPOST COLPORRAPHY W/CYSTO N/A 5/23/2016    Procedure: COLPORRHAPHY ANTERIOR POSTERIOR WITH GRAFT; ENTEROCELE REPAIR;  Surgeon: Micha Bryant MD;  Location: [facility];  Service: UroGynecology          • UT COLONOSCOPY FLX DX W/COLLJ SPEC WHEN PFRMD N/A 10/17/2018    Procedure: COLONOSCOPY;  Surgeon: Faisal Miller MD;  Location: [facility];  Service: Gastroenterology   • UT COLPOPEXY VAGINAL EXTRAPERITONEAL APPROACH N/A 5/23/2016    Procedure: COLPOPEXY VAGINAL EXTRAPERITONEAL (VEC);  Surgeon: Micha Bryant MD;  [facility];  Service: UroGynecology          • UT CYSTOURETHROSCOPY N/A 5/23/2016    Procedure: CYSTOSCOPY;  Surgeon: Micha Bryant MD;  [facility];  Service: UroGynecology          • UT SLING OPERATION STRESS INCONTINENCE N/A 5/23/2016    Procedure: INSERTION PUBOVAGINAL SLING ;  Surgeon: Micha Bryant MD;  [facility];  Service: UroGynecology          • TUBAL LIGATION         Family History   Problem Relation Age of Onset   • Endometrial cancer Mother 68   • COPD Mother    • Coronary artery disease Father    • Heart disease Father    • No Known Problems Sister    • No Known Problems Sister    • Hypertension Brother    • No Known Problems Son    • No Known Problems Daughter    • No Known Problems Maternal Grandmother    • No Known Problems Maternal Grandfather    • No Known Problems Paternal Grandmother    • No Known Problems Paternal Grandfather    • No Known Problems Maternal Aunt    • No Known Problems Maternal Aunt    • No Known Problems Maternal Aunt    • No Known Problems Maternal Aunt    • No Known Problems Paternal Aunt          Medications have been verified.        Objective   /79   Pulse 68   Temp 98.2 °F (36.8 °C)   Resp 18   Ht 5' 4\" (1.626 m)   Wt 73 kg (161 lb)   SpO2 95%   BMI 27.64 kg/m²   No LMP recorded. Patient is postmenopausal.       Physical Exam     Physical Exam  Constitutional:       General: She is not in acute distress.     " Appearance: Normal appearance. She is normal weight.   Skin:     Findings: Rash present. Rash is macular and urticarial.             Comments: Large plaque area over right side flank midaxillary line appears to be greater than 9% of total BSA.  Small <0.5cm abrasion, scabbed over, without drainage over the areas of rib 8 midaxillary line.      No rash to adjacent skin including under arms, upper arms, or across belt line.   Neurological:      Mental Status: She is alert.

## 2024-06-27 ENCOUNTER — CLINICAL SUPPORT (OUTPATIENT)
Dept: BARIATRICS | Facility: CLINIC | Age: 69
End: 2024-06-27

## 2024-06-27 VITALS — BODY MASS INDEX: 27.14 KG/M2 | HEIGHT: 64 IN | WEIGHT: 159 LBS

## 2024-06-27 DIAGNOSIS — R63.5 ABNORMAL WEIGHT GAIN: Primary | ICD-10-CM

## 2024-06-27 PROCEDURE — RECHECK

## 2024-07-02 ENCOUNTER — SOCIAL WORK (OUTPATIENT)
Dept: BEHAVIORAL/MENTAL HEALTH CLINIC | Facility: CLINIC | Age: 69
End: 2024-07-02
Payer: COMMERCIAL

## 2024-07-02 DIAGNOSIS — F41.9 ANXIETY: Primary | ICD-10-CM

## 2024-07-02 PROCEDURE — 90837 PSYTX W PT 60 MINUTES: CPT | Performed by: SOCIAL WORKER

## 2024-07-02 NOTE — PSYCH
"Behavioral Health Psychotherapy Progress Note    Psychotherapy Provided: Individual Psychotherapy     Encounter Diagnoses   Name Primary?   • Anxiety Yes           Goals addressed in session: Goal 1     DATA: Phoebe spoke further today about her feelings re: her 's conflicts with their children.  She reported that she has learned that she has no control over his choices and this change has been \"freeing.\"    Stage of change for addressing substance use diagnoses: No substance use/Not applicable    ASSESSMENT:  Phoebe Colbert presents with a Euthymic/ normal mood. She once again demonstrated insight and healthier boundaries which is indicative of her growth through therapy.  her affect is Normal range and intensity, which is congruent, with her mood and the content of the session. The client has made progress on their goals.     Phoebe Colbert presents with a minimal risk of suicide, minimal risk of self-harm, and none risk of harm to others.    For any risk assessment that surpasses a \"low\" rating, a safety plan must be developed.    A safety plan was indicated: no  If yes, describe in detail     PLAN: Between sessions, Phoebe Colbert will discuss with her  ways that they can share their concerns with each other and their respective therapists.  At the next session, the therapist will use Supportive Psychotherapy to address the above in further detail.    Behavioral Health Treatment Plan and Discharge Planning: Phoebe Colbert is aware of and agrees to continue to work on their treatment plan. They have identified and are working toward their discharge goals. yes    Visit start and stop times:    7/2/24      Start Time: 1500  Stop Time: 1555  Total Visit Time: 55 minutes  "

## 2024-07-07 DIAGNOSIS — F32.A DEPRESSION, UNSPECIFIED DEPRESSION TYPE: ICD-10-CM

## 2024-07-11 ENCOUNTER — CLINICAL SUPPORT (OUTPATIENT)
Dept: BARIATRICS | Facility: CLINIC | Age: 69
End: 2024-07-11
Payer: COMMERCIAL

## 2024-07-11 VITALS — WEIGHT: 161.2 LBS | BODY MASS INDEX: 24.43 KG/M2 | HEIGHT: 68 IN

## 2024-07-11 DIAGNOSIS — R63.5 ABNORMAL WEIGHT GAIN: Primary | ICD-10-CM

## 2024-07-11 PROCEDURE — RECHECK

## 2024-07-23 ENCOUNTER — SOCIAL WORK (OUTPATIENT)
Dept: BEHAVIORAL/MENTAL HEALTH CLINIC | Facility: CLINIC | Age: 69
End: 2024-07-23
Payer: COMMERCIAL

## 2024-07-23 DIAGNOSIS — F41.9 ANXIETY: Primary | ICD-10-CM

## 2024-07-23 PROCEDURE — 90834 PSYTX W PT 45 MINUTES: CPT | Performed by: SOCIAL WORKER

## 2024-07-25 ENCOUNTER — CLINICAL SUPPORT (OUTPATIENT)
Dept: BARIATRICS | Facility: CLINIC | Age: 69
End: 2024-07-25

## 2024-07-25 VITALS — HEIGHT: 64 IN | BODY MASS INDEX: 27.21 KG/M2 | WEIGHT: 159.4 LBS

## 2024-07-25 DIAGNOSIS — R63.5 ABNORMAL WEIGHT GAIN: Primary | ICD-10-CM

## 2024-07-25 PROCEDURE — RECHECK

## 2024-08-08 ENCOUNTER — TELEPHONE (OUTPATIENT)
Dept: BARIATRICS | Facility: CLINIC | Age: 69
End: 2024-08-08

## 2024-08-08 ENCOUNTER — TELEPHONE (OUTPATIENT)
Age: 69
End: 2024-08-08

## 2024-08-08 NOTE — TELEPHONE ENCOUNTER
Patient called cancelled appointment 08/08/2024 at 1 pm. Please call patient to reschedule at [phone]

## 2024-08-12 NOTE — PROGRESS NOTES
Weight Management Medical Nutrition Assessment  Phoebe presented for a month 3 f/u [facility] visit. Today's weight is 161.3#. Maintained x 9 weeks. Overall loss of 2.6#. Demi reports feeling great. She weighs herself at home and is pleased with her progress. Although she wishes she lost more weight she has noticed a lot of positive lifestyle changes. She wishes to continue in the [facility] as she enjoys the classes. RD scheduled classes and f/u visit. Completed a body composition using SECA scale and reviewed results with patient. Reevue indirect calorimter revealed REE is normal (+5%) compared to the predictive normal for someone her same age, height, and gender.  Reevue Indirect Calorimeter REE: 1397           Weight loss without exercise:  9234-3345         Weight loss with exercise:  6525-2672                    Maintenance:   6783-0093           Patient seen by Medical Provider in past 6 months:  yes, Dong Gamboa on 4/16/24  Requested to schedule appointment with Medical Provider: No      Anthropometric Measurements  Start Weight (#): 163.9# 4/24/24  Current Weight (#): 161.3#  TBW % Change from start weight: 1.6%  Ideal Body Weight (#): 120#  Goal Weight (#): 145#  Highest: 170#  Lowest: 150#    Weight Loss History  Previous weight loss attempts: Commercial Programs (Weight Watchers, Paige Keven, etc.)  Diet  Exercise    Food and Nutrition Related History  Wake up: 6-6:30AM   Bed Time: 9PM     Food Recall (updates in bold)  Breakfast: 1 cup coffee with milk  9-10AM Greek yogurt + blueberries + 100 calorie pack of almonds OR Fairlife high protein shake    Snack: rice cake OR 2 hard boiled eggs  Lunch: 1PM 2 slices 647 bread + 3 oz tuna or turkey burger + 1 Tbsp palma + vegetables  Snack: skip  Dinner: 5-6PM at least 4oz salmon or chicken breast + vegetables OR Lean Cuisine + veggies  Snack: 8PM 2 chocolate rice cakes + 1 Tbsp peanut butter    Beverages: 64oz water, 1 cup coffee with 2% milk  Alcohol:  Summer nayan (2 beers)  Volume of beverage intake: 40oz    Weekends: Same  Cravings: sweets  Trouble area of day: 8PM craves sweets, reports she isn't usually hungry at this time    Frequency of Eating out: once per week (usually orders salmon)  Food restrictions: no allergies or intolerances, doesn't eat much red meat  Cooking: self   Food Shopping: self    Physical Activity Intake  Activity: Cardio/treadmill (40 minutes), strength training (15 minutes) (if not at the gym walks 10,000 steps daily)  Frequency: 3 days per week  Physical limitations/barriers to exercise: n/a    Estimated Needs   Energy  SECA: BMR:  1323    [address] [employer] Energy Needs: BMR : 1263   1-2# loss weekly sedentary:  516-1116             1-2# loss weekly lightly active: 737-1237  Maintenance calories for sedentary activity level: 1516  Protein: 65-82g      (1.2-1.5g/kg IBW)  Fluid: 64oz     (35mL/kg IBW)    Nutrition Diagnosis  Yes;    Overweight/obesity  related to Food and nutrition related knowledge deficit as evidenced by  BMI more than normative standard for age and sex (overweight 25-29.9)        Nutrition Intervention    Nutrition Prescription  Calories: 1,200-1400 (flex up when active)  Protein: 75-80g  Fluid: 64oz    Meal Plan (John/Pro/Carb)  Breakfast: 300/20-25  Snack:  Lunch: 300/20-25  Snack: 100-150/5-10  Dinner: 300-400/20-25  Snack: 100-150/5-10    Nutrition Education:    [facility] Manual  Calorie controlled menu  Lean protein food choices  Healthy snack options  Food journaling tips      Nutrition Counseling:  Strategies: meal planning, portion sizes, healthy snack choices, hydration, fiber intake, protein intake, exercise, food journal      Monitoring and Evaluation:  Evaluation criteria:  Energy Intake  Meet protein needs  Maintain adequate hydration  Monitor weekly weight  Meal planning/preparation  Food journal   Decreased portions at mealtimes and snacks  Physical activity     Barriers to learning:none  Readiness to  change: Action:  (Changing behavior)  Comprehension: very good  Expected Compliance: very good

## 2024-08-14 ENCOUNTER — APPOINTMENT (OUTPATIENT)
Dept: LAB | Facility: CLINIC | Age: 69
End: 2024-08-14
Payer: COMMERCIAL

## 2024-08-14 ENCOUNTER — OFFICE VISIT (OUTPATIENT)
Dept: CARDIOLOGY CLINIC | Facility: CLINIC | Age: 69
End: 2024-08-14
Payer: COMMERCIAL

## 2024-08-14 VITALS
HEART RATE: 62 BPM | BODY MASS INDEX: 27.54 KG/M2 | WEIGHT: 161.3 LBS | SYSTOLIC BLOOD PRESSURE: 118 MMHG | HEIGHT: 64 IN | OXYGEN SATURATION: 96 % | DIASTOLIC BLOOD PRESSURE: 72 MMHG

## 2024-08-14 DIAGNOSIS — Z82.49 FAMILY HISTORY OF EARLY CAD: ICD-10-CM

## 2024-08-14 DIAGNOSIS — E78.5 HYPERLIPIDEMIA, UNSPECIFIED HYPERLIPIDEMIA TYPE: ICD-10-CM

## 2024-08-14 DIAGNOSIS — K44.9 HIATAL HERNIA: ICD-10-CM

## 2024-08-14 DIAGNOSIS — E66.3 OVERWEIGHT (BMI 25.0-29.9): ICD-10-CM

## 2024-08-14 DIAGNOSIS — I25.10 CORONARY ARTERY DISEASE INVOLVING NATIVE CORONARY ARTERY OF NATIVE HEART WITHOUT ANGINA PECTORIS: Primary | ICD-10-CM

## 2024-08-14 DIAGNOSIS — K21.9 GASTROESOPHAGEAL REFLUX DISEASE, UNSPECIFIED WHETHER ESOPHAGITIS PRESENT: ICD-10-CM

## 2024-08-14 DIAGNOSIS — R73.03 PREDIABETES: ICD-10-CM

## 2024-08-14 LAB
CHOLEST SERPL-MCNC: 166 MG/DL
EST. AVERAGE GLUCOSE BLD GHB EST-MCNC: 123 MG/DL
HBA1C MFR BLD: 5.9 %
HDLC SERPL-MCNC: 65 MG/DL
LDLC SERPL CALC-MCNC: 88 MG/DL (ref 0–100)
NONHDLC SERPL-MCNC: 101 MG/DL
TRIGL SERPL-MCNC: 65 MG/DL

## 2024-08-14 PROCEDURE — 83036 HEMOGLOBIN GLYCOSYLATED A1C: CPT

## 2024-08-14 PROCEDURE — 80061 LIPID PANEL: CPT

## 2024-08-14 PROCEDURE — 99214 OFFICE O/P EST MOD 30 MIN: CPT | Performed by: INTERNAL MEDICINE

## 2024-08-14 PROCEDURE — 36415 COLL VENOUS BLD VENIPUNCTURE: CPT

## 2024-08-14 PROCEDURE — 93000 ELECTROCARDIOGRAM COMPLETE: CPT | Performed by: INTERNAL MEDICINE

## 2024-08-14 NOTE — PROGRESS NOTES
Cardiology Follow Up Visit       Phoebe Colbert   [id number]  1955      [facility]  [address] [place] [address] [place] [id number]      Physician Requesting Consult:   Chip Awad MD    Reason for Follow Up Visit / Principal Problem:  Mrs. Phoebe Colbert is a 69 y.o. female and never smoker with a PMH significant for but not limited to CAD (, 2020), HLD, Prediabetes (resolved), GERD, DAVID and Overweight.  She presents to clinic for routine Follow Up.    Interval History:  Mrs. Colbert was originally seen in clinic on 23 to establish care. She'd been at her baseline state of health: independent of adl's, working as a CV RN and Educator , and exercising regularly with routine walking and strength training , when she was seen by her PCP and referred to Cardiology .  She'd noted a 20-lb weight gain during the COVID pandemic, and felt sluggish overall.  Since seeing her PCP, she'd noted occasional bouts of JUDD when ascending stairs and when singing at [employer]. She pointed out that she was able to complete treadmill workouts without similar dyspnea. Of note, she previously denied any recent cardiac hospitalizations, prior cardiac history beyond her elevated CCS. She reported a relative with a MI at age 52, and she noted that all her siblings were on statins. She later reported her mother had a CVA at 71 yo and  at age 78.  We proceeded with an SE that demonstrated no LV Dysfunction or RWMA after 9 min of Exercise, achieving 10.1 METs. We also increased her statin to a high-intensity dose at 20 mg, which she tolerated well.  During our follow up visit on 23, her repeat FLP demonstrated excellent response and her A1c returned to normal limits. She was concerned about weight control, and she was referred to  Weight Mgmt. During our 04/10/24 visit, she'd done well overall though she continued to struggle with weight loss. Her FLP/A1c though stable  had up trended slightly, and we adjusted her statin therapy.    Since our last visit, she noted improvement in weight management with slow and steady loss. Her repeat LDL was down to 88 mg/dL. She is planning on making adjustments in her work schedule to accomodate more time for self care. With dedicated efforts to ascend stairs at work, she continues to note improvement in johnson while ascending stairs. She currently denies any associated cp, diaphoresis, n/v, sob at rest, palpitations, near syncope, syncope, orthopnea, pnd, or ble edema. She notes good control of her diet and exercise habits.  She reports a diet that is well balanced, close control of salt intake, water intake of 64 oz/day, caffeine intake of 1 cup/day, rare alcohol intake, and regular exercise that includes a combination of sufficient aerobic activity each week and strength training at least twice a week. She is otherwise compliant with medications but does not maintain a detailed BP log.  Of note, the patient's cardiac risk factor(s) include: advanced age, family history of premature ASCVD, dyslipidemia, and  delivery.       Assessment & Plan   CAD/Coronary Artery Calcification,  on 20     Tolerating medical mgmt without progressive symptoms, Exercise SE without evidence of ischemia on 23, Now ascending stairs regularly without recurrent symptoms  Cont GDMT for CAD on asa/statin, Cont risk factor reduction, Cont diet/lifestyle optimization  Monitor routinely for now, Record BP/HR and log if symptoms return, ED/Clinic precautions reviewed    HLD, recent FLP:  / TRI 65 / HDL 65 / LDL 88 on 24, 4.9% 10-Year ASCVD Estimated Risk  Tolerating statin without significant adverse reactions  Cont current medication regimen, cont counseling on diet and lifestyle modification  Monitor FLP routinely as ordered by PCP, will follow peripherally    Prediabetes, most recent A1c 5.7 on 24, up from 5.6 on 23  No  prior medical mgmt, diet controlled  Check A1c per PCP, Cont counseling on diet and lifestyle changes  Monitor labs as noted above, will reassess on next visit    Overweight, Body mass index is 27.69 kg/m².  Weight has been down-trending overall: down 10 lbs over the last year  Cont to review the importance of diet and lifestyle modification, cont ongoing workup and treatment by [facility]  Will monitor routinely at this time    Discussion / Summary:  Precautions and reasons to call our office or proceed to ER were discussed in detail. Patient expressed understanding and questions were answered. Plan on follow up in-clinic in 6 months.    Office Visit Diagnosis:  1. Coronary artery disease involving native coronary artery of native heart without angina pectoris  POCT ECG      2. Hyperlipidemia, unspecified hyperlipidemia type        3. Prediabetes  Hemoglobin A1C      4. Gastroesophageal reflux disease, unspecified whether esophagitis present        5. Hiatal hernia        6. Overweight (BMI 25.0-29.9)        7. Family history of early CAD                Subjective   Review of Systems   Constitutional: Negative for chills and fever.   HENT:  Negative for congestion and sore throat.    Eyes:  Negative for blurred vision and pain.   Cardiovascular:  Positive for dyspnea on exertion (improving with conditioning at work). Negative for chest pain, claudication, leg swelling, near-syncope, orthopnea, palpitations, paroxysmal nocturnal dyspnea and syncope.   Respiratory:  Negative for cough, shortness of breath (none at rest), sleep disturbances due to breathing, snoring and wheezing.    Hematologic/Lymphatic: Negative for bleeding problem. Does not bruise/bleed easily.   Skin:  Negative for itching and rash.   Musculoskeletal:  Positive for arthritis (mild, nothing new) and back pain (stable, chronic). Negative for joint swelling.   Gastrointestinal:  Positive for constipation (manageable, otc once a week if  needed). Negative for abdominal pain, diarrhea, nausea and vomiting.   Genitourinary:  Negative for dysuria and hematuria.   Neurological:  Negative for numbness and paresthesias.   Psychiatric/Behavioral:  Negative for depression. The patient is nervous/anxious (well controlled with zoloft).          The following portions of the patient's history were reviewed and updated as appropriate: past medical history, past social history, past family history, past surgical history, current medications, allergies, past surgical history and problem list.  Past Medical History:   Diagnosis Date    Anxiety     Body mass index 27.0-27.9, adult     Colon cancer screening     Colon polyp     Depression     GERD (gastroesophageal reflux disease)     takes tums occasionaly    GERD (gastroesophageal reflux disease)     Hyperlipidemia     Viral intestinal infection      Social History     Socioeconomic History    Marital status: /Civil Union     Spouse name: Not on file    Number of children: Not on file    Years of education: Not on file    Highest education level: Not on file   Occupational History    Not on file   Tobacco Use    Smoking status: Never    Smokeless tobacco: Never   Vaping Use    Vaping status: Never Used   Substance and Sexual Activity    Alcohol use: Yes     Alcohol/week: 1.0 standard drink of alcohol     Types: 1 Cans of beer per week     Comment: weekly    Drug use: No    Sexual activity: Yes     Partners: Male   Other Topics Concern    Not on file   Social History Narrative    Not on file     Social Determinants of Health     Financial Resource Strain: Not on file   Food Insecurity: Not on file   Transportation Needs: Not on file   Physical Activity: Not on file   Stress: Not on file   Social Connections: Not on file   Intimate Partner Violence: Not on file   Housing Stability: Not on file     Family History   Problem Relation Age of Onset    Endometrial cancer Mother 68    COPD Mother     Coronary artery  disease Father     Heart disease Father     No Known Problems Sister     No Known Problems Sister     Hypertension Brother     No Known Problems Son     No Known Problems Daughter     No Known Problems Maternal Grandmother     No Known Problems Maternal Grandfather     No Known Problems Paternal Grandmother     No Known Problems Paternal Grandfather     No Known Problems Maternal Aunt     No Known Problems Maternal Aunt     No Known Problems Maternal Aunt     No Known Problems Maternal Aunt     No Known Problems Paternal Aunt      Past Surgical History:   Procedure Laterality Date    CARPAL TUNNEL RELEASE Bilateral     CHOLECYSTECTOMY  08/2011    COLONOSCOPY      COLONOSCOPY N/A 10/18/2018    Procedure: COLONOSCOPY;  Surgeon: Faisal Miller MD;  Location: [facility];  Service: Gastroenterology    CYSTOSCOPY      IL CMBND ANTERPOST COLPORRAPHY W/CYSTO N/A 5/23/2016    Procedure: COLPORRHAPHY ANTERIOR POSTERIOR WITH GRAFT; ENTEROCELE REPAIR;  Surgeon: Micha Bryant MD;  Location: [facility];  Service: UroGynecology           IL COLONOSCOPY FLX DX W/COLLJ SPEC WHEN PFRMD N/A 10/17/2018    Procedure: COLONOSCOPY;  Surgeon: Faisal Miller MD;  Location: [facility];  Service: Gastroenterology    IL COLPOPEXY VAGINAL EXTRAPERITONEAL APPROACH N/A 5/23/2016    Procedure: COLPOPEXY VAGINAL EXTRAPERITONEAL (VEC);  Surgeon: Micha Bryant MD;  [facility];  Service: UroGynecology           IL CYSTOURETHROSCOPY N/A 5/23/2016    Procedure: CYSTOSCOPY;  Surgeon: Micha Bryant MD;  [facility];  Service: UroGynecology           IL SLING OPERATION STRESS INCONTINENCE N/A 5/23/2016    Procedure: INSERTION PUBOVAGINAL SLING ;  Surgeon: Micha Bryant MD;  [facility];  Service: UroGynecology           TUBAL LIGATION         Current Outpatient Medications:     aspirin (ECOTRIN LOW STRENGTH) 81 mg EC tablet, Take 1 tablet (81 mg total) by mouth daily, Disp: 30 tablet, Rfl: 0    bisacodyl (DULCOLAX) 5 mg EC  "tablet, Take 1 tablet (5 mg total) by mouth daily as needed for constipation, Disp: 2 tablet, Rfl: 0    Calcium Carbonate-Vitamin D (CALCIUM 500/D PO), Take by mouth, Disp: , Rfl:     Cholecalciferol (VITAMIN D) 2000 units CAPS, Take by mouth, Disp: , Rfl:     docusate sodium (COLACE) 100 mg capsule, Take 100 mg by mouth in the morning, Disp: , Rfl:     famotidine (PEPCID) 20 mg tablet, Take 1 tablet (20 mg total) by mouth 2 (two) times a day, Disp: 60 tablet, Rfl: 2    methylPREDNISolone 4 MG tablet therapy pack, Use as directed on package, Disp: 21 each, Rfl: 0    Multiple Vitamins-Minerals (MULTIVITAL-M PO), Take by mouth, Disp: , Rfl:     rosuvastatin (CRESTOR) 40 MG tablet, Take 1 tablet (40 mg total) by mouth daily, Disp: 90 tablet, Rfl: 3    sertraline (ZOLOFT) 50 mg tablet, TAKE ONE TABLET BY MOUTH DAILY, Disp: 90 tablet, Rfl: 0    triamcinolone (KENALOG) 0.1 % cream, Apply topically 2 (two) times a day, Disp: 60 g, Rfl: 0  Allergies   Allergen Reactions    Levaquin [Levofloxacin] Swelling and Other (See Comments)     Fluid in knee    Quinolones Other (See Comments)     And levaquin     Patient Active Problem List   Diagnosis    Anxiety    Lichen sclerosus    GERD (gastroesophageal reflux disease)    Pure hypercholesterolemia    Hiatal hernia    Chronic idiopathic constipation    History of colon polyps    Overweight       Objective     Vitals:    08/14/24 1600   BP: 118/72   BP Location: Left arm   Patient Position: Sitting   Cuff Size: Standard   Pulse: 62   SpO2: 96%   Weight: 73.2 kg (161 lb 4.8 oz)   Height: 5' 4\" (1.626 m)       Body mass index is 27.69 kg/m².    Physical Exam  Constitutional:       General: She is not in acute distress.     Appearance: Normal appearance. She is not toxic-appearing.   HENT:      Head: Normocephalic and atraumatic.      Right Ear: External ear normal.      Left Ear: External ear normal.   Eyes:      General: No scleral icterus.        Right eye: No discharge.         " Left eye: No discharge.      Conjunctiva/sclera: Conjunctivae normal.   Neck:      Vascular: No carotid bruit.   Cardiovascular:      Rate and Rhythm: Normal rate and regular rhythm.      Pulses: Normal pulses.      Heart sounds: Normal heart sounds. No murmur heard.     No gallop.   Pulmonary:      Effort: Pulmonary effort is normal. No respiratory distress.      Breath sounds: Normal breath sounds. No wheezing or rales.   Musculoskeletal:      Cervical back: Neck supple.      Right lower leg: No edema.      Left lower leg: No edema.   Skin:     General: Skin is warm and dry.      Capillary Refill: Capillary refill takes less than 2 seconds.   Neurological:      General: No focal deficit present.      Mental Status: She is alert and oriented to person, place, and time. Mental status is at baseline.      Motor: No weakness.      Gait: Gait normal.   Psychiatric:         Mood and Affect: Mood normal.         Behavior: Behavior normal.     Labs:  Lab Results   Component Value Date    K 4.0 11/04/2023    K 4.1 07/08/2021    K 4.2 11/03/2019     11/04/2023     07/08/2021     (H) 11/03/2019    CO2 27 11/04/2023    CO2 28 07/08/2021    CO2 27 11/03/2019    BUN 21 11/04/2023    BUN 18 07/08/2021    BUN 17 11/03/2019    CREATININE 0.81 11/04/2023    CREATININE 0.71 07/08/2021    CREATININE 0.78 11/03/2019    EGFR 75 11/04/2023    EGFR 90 07/08/2021    EGFR 81 11/03/2019    GLUC 92 07/08/2021    GLUC 87 12/28/2016    GLUC 89 05/05/2016    AST 21 11/04/2023    AST 23 07/08/2021    AST 18 11/03/2019    ALT 19 11/04/2023    ALT 32 07/08/2021    ALT 31 11/03/2019    TBILI 0.95 11/04/2023    TBILI 0.65 07/08/2021    TBILI 0.63 11/03/2019    ALB 4.3 11/04/2023    ALB 3.6 07/08/2021    ALB 3.7 11/03/2019    CALCIUM 9.8 11/04/2023    CALCIUM 9.4 07/08/2021    CALCIUM 9.6 11/03/2019      Lab Results   Component Value Date    WBC 6.71 11/04/2023    WBC 6.90 07/08/2021    WBC 6.21 10/16/2018    HGB 14.8 11/04/2023     "HGB 14.0 07/08/2021    HGB 14.2 10/16/2018    HCT 44.3 11/04/2023    HCT 42.8 07/08/2021    HCT 43.0 10/16/2018     11/04/2023     07/08/2021     10/16/2018      Lab Results   Component Value Date    CHOLESTEROL 166 08/14/2024    CHOLESTEROL 178 04/09/2024    CHOLESTEROL 172 11/04/2023    TRIG 65 08/14/2024    TRIG 58 04/09/2024    TRIG 49 11/04/2023    TRIG 41 07/02/2015    TRIG 38 07/24/2014    HDL 65 08/14/2024    HDL 65 04/09/2024    HDL 66 11/04/2023    HDL 74 07/02/2015    HDL 82 07/24/2014    LDLCALC 88 08/14/2024    LDLCALC 101 (H) 04/09/2024    LDLCALC 96 11/04/2023    LDLCALC 146 (H) 07/02/2015    LDLCALC 123 (H) 07/24/2014     Lab Results   Component Value Date    HGBA1C 5.9 (H) 08/14/2024    HGBA1C 5.7 (H) 04/09/2024    HGBA1C 5.6 03/31/2023    HGBA1C 5.6 07/02/2015    GLUF 93 11/04/2023    GLUF 94 11/03/2019    GLUF 81 10/16/2018    GLUF 99 07/24/2014    POCGLU 101 06/23/2024     No results found for: \"TSH\", \"FT4\"  No results found for: \"HSTNI0\", \"HSTNI2\", \"HSTNI4\", \"TROPONINI\"  No results found for: \"BNP\", \"NTBNP\"     Imaging:  I have personally reviewed pertinent reports.  No results found.    Imaging:  I have personally reviewed pertinent reports.  No results found.    Cardiac Studies:  EKG:   Date: 08/14/24, normal sinus rhythm  Date: 04/25/23, normal sinus rhythm    Tele:   Normal sinus rhythm     Holter:   No results found for this or any previous visit.    Recent Device Check:  No results found for this or any previous visit.    Previous EPS/Interventions:  No results found for this or any previous visit.    Previous CCS/CCTA:  CT CORONARY ARTERY CALCIUM SCREENING WITHOUT INTRAVENOUS CONTRAST  STUDY DATE: 11/22/20  FINDINGS:   Coronary artery calcium score breakdown is as follows:  Left main coronary artery:  18  Left anterior descending coronary artery and diagonal branches:  104  Left circumflex coronary artery and left marginal branches:  13  Right coronary artery and " right marginal branches:  0  Posterior descending coronary artery: 0  TOTAL coronary calcium score:  135  IMPRESSION:  Total coronary calcium score equals 135.     Previous Cath/PCI:  No results found for this or any previous visit.    Previous STRESS TEST:  Results for testing completed on the encounter of 05/09/23  Study: Echo stress test, exercise  Interpreted Summary    Left Ventricle: The left ventricular ejection fraction is 60%. Systolic function is normal. Wall motion is normal.    Stress ECG: No ST deviation is noted. The ECG was negative for ischemia. The stress ECG is negative for ischemia after maximal exercise.    Post Stress Echo: Left ventricle cavity has normal reduction in size post-stress. The left ventricle systolic function is normal post-stress with an EF of 70 %.    Echo Post Impression: This study shows a low prognostic risk. The study is normal, after maximal exercise.    ECHO:  No results found for this or any previous visit.    PAULINA:  No results found for this or any previous visit.    CMR:  No results found for this or any previous visit.    Counseling / Coordination of Care:  During our visit, I spent 20 minutes with the patient, and greater than 55% of this time was spent on counseling and coordination of care, including addressing diagnostic results, prognosis, risks and benefits of treatment options, instructions for management, patient/family education, importance of treatment compliance, along with risk factor reductions.    Dictation Disclaimer:  This note was completed in part utilizing [employer] direct voice recognition software. Grammatical errors, random word insertion, spelling mistakes, and incomplete sentences may be an occasional consequence of the system secondary to software limitations, ambient noise and hardware issues. At the time of dictation, efforts were made to edit, clarify and /or correct errors.  Please read the chart carefully and recognize, using context,  where substitutions have occurred.  If you have any questions or concerns about the context, text or information contained within the body of this dictation, please contact myself, the provider, for further clarification.     Lachelle Marquez,  08/14/24

## 2024-08-15 ENCOUNTER — CLINICAL SUPPORT (OUTPATIENT)
Dept: BARIATRICS | Facility: CLINIC | Age: 69
End: 2024-08-15

## 2024-08-15 ENCOUNTER — PATIENT OUTREACH (OUTPATIENT)
Dept: BARIATRICS | Facility: CLINIC | Age: 69
End: 2024-08-15

## 2024-08-15 VITALS — WEIGHT: 160.4 LBS | HEIGHT: 64 IN | BODY MASS INDEX: 27.39 KG/M2

## 2024-08-15 DIAGNOSIS — R63.5 ABNORMAL WEIGHT GAIN: Primary | ICD-10-CM

## 2024-08-15 PROCEDURE — RECHECK

## 2024-08-15 NOTE — PROGRESS NOTES
Patient requesting carbohydrate goal. RD recommended 90-120g carbs daily as this is 30-40% of her total calories.

## 2024-08-19 ENCOUNTER — DOCUMENTATION (OUTPATIENT)
Dept: BEHAVIORAL/MENTAL HEALTH CLINIC | Facility: CLINIC | Age: 69
End: 2024-08-19

## 2024-08-19 NOTE — PROGRESS NOTES
Treatment Plan Tracking    # 1Treatment Plan not completed within required time limits due to: Phoebe Colbert canceled appointment on 08/19/24.

## 2024-08-20 ENCOUNTER — CLINICAL SUPPORT (OUTPATIENT)
Dept: BARIATRICS | Facility: CLINIC | Age: 69
End: 2024-08-20

## 2024-08-20 VITALS — HEIGHT: 64 IN | WEIGHT: 161.3 LBS | BODY MASS INDEX: 27.54 KG/M2

## 2024-08-20 DIAGNOSIS — R63.5 ABNORMAL WEIGHT GAIN: Primary | ICD-10-CM

## 2024-08-20 PROCEDURE — RECHECK

## 2024-08-29 ENCOUNTER — CLINICAL SUPPORT (OUTPATIENT)
Dept: BARIATRICS | Facility: CLINIC | Age: 69
End: 2024-08-29

## 2024-08-29 VITALS — BODY MASS INDEX: 27.62 KG/M2 | HEIGHT: 64 IN | WEIGHT: 161.8 LBS

## 2024-08-29 DIAGNOSIS — R63.5 ABNORMAL WEIGHT GAIN: Primary | ICD-10-CM

## 2024-08-29 PROCEDURE — RECHECK

## 2024-09-05 ENCOUNTER — CLINICAL SUPPORT (OUTPATIENT)
Dept: BARIATRICS | Facility: CLINIC | Age: 69
End: 2024-09-05

## 2024-09-05 VITALS — HEIGHT: 64 IN | WEIGHT: 162.2 LBS | BODY MASS INDEX: 27.69 KG/M2

## 2024-09-05 DIAGNOSIS — R63.5 ABNORMAL WEIGHT GAIN: Primary | ICD-10-CM

## 2024-09-05 PROCEDURE — RECHECK

## 2024-09-17 ENCOUNTER — HOSPITAL ENCOUNTER (OUTPATIENT)
Dept: RADIOLOGY | Facility: IMAGING CENTER | Age: 69
Discharge: HOME | End: 2024-09-17
Payer: COMMERCIAL

## 2024-09-17 VITALS — BODY MASS INDEX: 27.66 KG/M2 | WEIGHT: 162 LBS | HEIGHT: 64 IN

## 2024-09-17 DIAGNOSIS — Z12.31 ENCOUNTER FOR SCREENING MAMMOGRAM FOR BREAST CANCER: ICD-10-CM

## 2024-09-17 PROCEDURE — 77067 SCR MAMMO BI INCL CAD: CPT

## 2024-09-17 PROCEDURE — 77063 BREAST TOMOSYNTHESIS BI: CPT

## 2024-09-24 ENCOUNTER — SOCIAL WORK (OUTPATIENT)
Dept: BEHAVIORAL/MENTAL HEALTH CLINIC | Facility: CLINIC | Age: 69
End: 2024-09-24
Payer: COMMERCIAL

## 2024-09-24 DIAGNOSIS — F41.9 ANXIETY: Primary | ICD-10-CM

## 2024-09-24 PROCEDURE — 90834 PSYTX W PT 45 MINUTES: CPT | Performed by: SOCIAL WORKER

## 2024-09-24 NOTE — BH TREATMENT PLAN
Outpatient Behavioral Health Psychotherapy Treatment Plan    Phoebe Weselidia  1955     Date of Initial Psychotherapy Assessment:   Date of Current Treatment Plan: 09/24/24  Treatment Plan Target Date: 3/24/25  Treatment Plan Expiration Date: 3/24/25    Diagnosis:   No diagnosis found.        Area(s) of Need: I benefit from having my therapist as a outlet, particularly as a place to receive support with my 's mental health struggles     Long Term Goal 1 (in the client's own words): I want to remain stable, consistent.     Stage of Change: Action    Target Date for completion: 3/24/25     Anticipated therapeutic modalities: Supportive Psychotherapy     People identified to complete this goal: Self      Objective 1: (identify the means of measuring success in meeting the objective): I will continue to lean on my support network.       Objective 2: (identify the means of measuring success in meeting the objective): I will maintain my boundaries particularly in doing what I want at work.     I am currently under the care of a [facility] psychiatric provider: no    My [facility] psychiatric provider is:     I am currently taking psychiatric medications: No    I feel that I will be ready for discharge from mental health care when I reach the following (measurable goal/objective): When I feel guaranteed that my anxiety will not overwhelm me.        I have created my Crisis Plan and have been offered a copy of this plan    Behavioral Health Treatment Plan [facility]: Diagnosis and Treatment Plan explained to Phoebe Simmsara [place] acknowledges an understanding of their diagnosis. Phoebe Colbert agrees to this treatment plan.    I have been offered a copy of this Treatment Plan. yes    Phoebe Colbert, 1955, actively participated in the review and update of this treatment plan Phoebe Colbert  provided verbal consent on 9/24/2024 at 4 PM. The treatment plan was transcribed into the Electronic Health Record  at a later time.

## 2024-09-24 NOTE — PSYCH
"Behavioral Health Psychotherapy Progress Note    Psychotherapy Provided: Individual Psychotherapy     No diagnosis found.            Goals addressed in session: Goal 1     DATA: Phoebe spoke today about her feelings re: her recent participation in a joint therapy session with her .  She shared how insightful this was for her and the insight that developed as a result of this particularly with regard to his conflictual connection with their adult daughter.     Stage of change for addressing substance use diagnoses: No substance use/Not applicable    ASSESSMENT:  Phoebe Colbert presents with a Euthymic/ normal mood. She once again demonstrated insight and healthier boundaries which is indicative of her growth through therapy.  her affect is Normal range and intensity, which is congruent, with her mood and the content of the session. The client has made progress on their goals.     Phoebe Colbert presents with a minimal risk of suicide, minimal risk of self-harm, and none risk of harm to others.    For any risk assessment that surpasses a \"low\" rating, a safety plan must be developed.    A safety plan was indicated: no  If yes, describe in detail     PLAN: Between sessions, Phoebe Colbert will discuss with her  ways that they can share their concerns with each other and their respective therapists.  At the next session, the therapist will use Supportive Psychotherapy to address the above in further detail.    Behavioral Health Treatment Plan and Discharge Planning: Phoebe Colbert is aware of and agrees to continue to work on their treatment plan. They have identified and are working toward their discharge goals. yes    Visit start and stop times:    9/24/24         "

## 2024-10-03 ENCOUNTER — CLINICAL SUPPORT (OUTPATIENT)
Dept: BARIATRICS | Facility: CLINIC | Age: 69
End: 2024-10-03
Payer: COMMERCIAL

## 2024-10-03 VITALS — WEIGHT: 161.2 LBS | BODY MASS INDEX: 27.52 KG/M2 | HEIGHT: 64 IN

## 2024-10-03 DIAGNOSIS — R63.5 ABNORMAL WEIGHT GAIN: Primary | ICD-10-CM

## 2024-10-03 PROCEDURE — RECHECK

## 2024-10-15 DIAGNOSIS — K21.00 GASTROESOPHAGEAL REFLUX DISEASE WITH ESOPHAGITIS WITHOUT HEMORRHAGE: ICD-10-CM

## 2024-10-15 RX ORDER — FAMOTIDINE 20 MG/1
20 TABLET, FILM COATED ORAL 2 TIMES DAILY
Qty: 60 TABLET | Refills: 0 | Status: SHIPPED | OUTPATIENT
Start: 2024-10-15

## 2024-10-17 ENCOUNTER — CLINICAL SUPPORT (OUTPATIENT)
Dept: BARIATRICS | Facility: CLINIC | Age: 69
End: 2024-10-17

## 2024-10-17 VITALS — HEIGHT: 64 IN | BODY MASS INDEX: 27.55 KG/M2 | WEIGHT: 161.4 LBS

## 2024-10-17 DIAGNOSIS — R63.5 ABNORMAL WEIGHT GAIN: Primary | ICD-10-CM

## 2024-10-17 PROCEDURE — RECHECK

## 2024-10-17 NOTE — PROGRESS NOTES
Weight Management Medical Nutrition Assessment  Phoebe presented for a Healthy Ways visit. Today's weight is 161.4#. Overall loss of 2.5#. She was 158# on gym scale yesterday. States she feels puffy today. This could be due to water retention. She is feeling amazing since improving her lifestyle. She has more energy, fits in smaller clothes, and is thrilled her labs have improved. She is active most days and if not at the gym she is walking at least 10,000 steps. Reports she can improve with water. Given exercise regimen patient is under eating. She is going to increase her calories on active days. Unable to continue with HW classes. Will f/u with body comp in lieu of missed HW classes in 3 weeks.    Patient seen by Medical Provider in past 6 months:  yes, Dong Gamboa on 4/16/24  Requested to schedule appointment with Medical Provider: No      Anthropometric Measurements  Start Weight (#): 163.9# 4/24/24  Current Weight (#): 161.4#  TBW % Change from start weight: 1.6%  Ideal Body Weight (#): 120#  Goal Weight (#): 150-155#  Highest: 170#  Lowest: 150#    Weight Loss History  Previous weight loss attempts: Commercial Programs (Weight Watchers, Paige Keven, etc.)  Diet  Exercise    Food and Nutrition Related History  Wake up: 6-6:30AM   Bed Time: 9PM     Food Recall (updates in bold)  Breakfast: 1 cup coffee with milk  9-10AM Greek yogurt + blueberries + 100 calorie pack of almonds OR Fairlife high protein shake    Snack: rice cake OR 2 hard boiled eggs  Lunch: 1PM 2 slices 647 bread + 3 oz tuna or turkey burger + 1 Tbsp palma + vegetables  Snack: skip  Dinner: 5-6PM at least 4oz salmon or chicken breast + vegetables OR Lean Cuisine + veggies  Snack: 8PM 2 chocolate rice cakes + 1 Tbsp peanut butter    Beverages: 64oz water, 1 cup coffee with 2% milk  Alcohol: Summer nayan (2 beers)  Volume of beverage intake: 40oz    Weekends: Same  Cravings: sweets  Trouble area of day: 8PM craves sweets, reports she isn't usually  hungry at this time    Frequency of Eating out: once per week (usually orders salmon)  Food restrictions: no allergies or intolerances, doesn't eat much red meat  Cooking: self   Food Shopping: self    Physical Activity Intake  Activity: Cardio/treadmill/bike (45 minutes, 2 miles), strength training (15 minutes) (if not at the gym walks 10,000 steps)  Frequency: 3-4 days per week  Physical limitations/barriers to exercise: n/a    Estimated Needs   Energy  SECA: BMR:  1323    [place] Energy Needs: BMR : 1263   1-2# loss weekly sedentary:  516-1116             1-2# loss weekly lightly active: 737-1237    1-2# loss moderately active: 827-1327  Maintenance calories for sedentary activity level: 1516  Protein: 65-82g      (1.2-1.5g/kg IBW)  Fluid: 64oz     (35mL/kg IBW)    Nutrition Diagnosis  Yes;    Overweight/obesity  related to Food and nutrition related knowledge deficit as evidenced by  BMI more than normative standard for age and sex (overweight 25-29.9)        Nutrition Intervention    Nutrition Prescription  Calories: 1,200-1400 (flex up when active)  Protein: 75-80g  Fluid: 64oz    Meal Plan (John/Pro/Carb)  Breakfast: 300/20-25  Snack:  Lunch: 300/20-25  Snack: 100-150/5-10  Dinner: 300-400/20-25  Snack: 100-150/5-10    Nutrition Education:    Healthy Core Manual  Calorie controlled menu  Lean protein food choices  Healthy snack options  Food journaling tips      Nutrition Counseling:  Strategies: meal planning, portion sizes, healthy snack choices, hydration, fiber intake, protein intake, exercise, food journal      Monitoring and Evaluation:  Evaluation criteria:  Energy Intake  Meet protein needs  Maintain adequate hydration  Monitor weekly weight  Meal planning/preparation  Food journal   Decreased portions at mealtimes and snacks  Physical activity     Barriers to learning:none  Readiness to change: Action:  (Changing behavior)  Comprehension: very good  Expected Compliance: very good

## 2024-10-23 ENCOUNTER — TELEPHONE (OUTPATIENT)
Dept: PSYCHIATRY | Facility: CLINIC | Age: 69
End: 2024-10-23

## 2024-10-31 ENCOUNTER — TELEPHONE (OUTPATIENT)
Age: 69
End: 2024-10-31

## 2024-10-31 NOTE — TELEPHONE ENCOUNTER
Phoebe called and wanted to let the office know that she did not No Show her appt on 10/22/24 at 4 pm with Rachele Webber. She states she spoke to Rachele just after that and let her know that she kept calling from work but was put on hold and not routed through. She was calling from [facility] and [facility]. Writer informed that this letter is done automatically. She does not want this to hurt her record so that she can not be seen by Rachele. Writer informed that a message would be sent to the office and Rachele would also be attached so that they are aware she tried to call to cancel.

## 2024-11-04 ENCOUNTER — TELEPHONE (OUTPATIENT)
Dept: PSYCHIATRY | Facility: CLINIC | Age: 69
End: 2024-11-04

## 2024-11-04 NOTE — TELEPHONE ENCOUNTER
Writer ARSENIO informing patient that her appointment on 10/22 has been cancelled out and the no show letter has been retracted therefore will not be counted against her. Provided office number should she have further questions or concerns.

## 2024-11-07 ENCOUNTER — CLINICAL SUPPORT (OUTPATIENT)
Dept: BARIATRICS | Facility: CLINIC | Age: 69
End: 2024-11-07

## 2024-11-07 VITALS — WEIGHT: 161.5 LBS | BODY MASS INDEX: 27.57 KG/M2 | HEIGHT: 64 IN

## 2024-11-07 DIAGNOSIS — R63.5 ABNORMAL WEIGHT GAIN: Primary | ICD-10-CM

## 2024-11-07 PROCEDURE — RECHECK

## 2024-11-19 ENCOUNTER — SOCIAL WORK (OUTPATIENT)
Dept: BEHAVIORAL/MENTAL HEALTH CLINIC | Facility: CLINIC | Age: 69
End: 2024-11-19
Payer: COMMERCIAL

## 2024-11-19 DIAGNOSIS — F41.9 ANXIETY: Primary | ICD-10-CM

## 2024-11-19 PROCEDURE — 90834 PSYTX W PT 45 MINUTES: CPT | Performed by: SOCIAL WORKER

## 2024-11-19 NOTE — PSYCH
"Behavioral Health Psychotherapy Progress Note    Psychotherapy Provided: Individual Psychotherapy     No diagnosis found.            Goals addressed in session: Goal 1     DATA: Phoebe spoke today about her feelings of excitement that her daughter is pregnant as this will be her first grandchild.  She shared her feelings re: the recent election and her attempts to support her friend group with the outcome.  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    Stage of change for addressing substance use diagnoses: No substance use/Not applicable    ASSESSMENT:  Phoebe Colbert presents with a Euthymic/ normal mood. She once again demonstrated insight and healthier boundaries which is indicative of her growth through therapy.  her affect is Normal range and intensity, which is congruent, with her mood and the content of the session. The client has made progress on their goals.     Phoebe Colbert presents with a minimal risk of suicide, minimal risk of self-harm, and none risk of harm to others.    For any risk assessment that surpasses a \"low\" rating, a safety plan must be developed.    A safety plan was indicated: no  If yes, describe in detail     PLAN: Between sessions, Phoebe Colbert will discuss with her  ways that they can share their concerns with each other and their respective therapists.  At the next session, the therapist will use Supportive Psychotherapy to address the above in further detail.    Behavioral Health Treatment Plan and Discharge Planning: Phoebe Colbert is aware of and agrees to continue to work on their treatment plan. They have identified and are working toward their discharge goals. yes    Visit start and stop times:        11/19/24     "

## 2024-12-11 ENCOUNTER — OFFICE VISIT (OUTPATIENT)
Dept: INTERNAL MEDICINE CLINIC | Facility: CLINIC | Age: 69
End: 2024-12-11
Payer: COMMERCIAL

## 2024-12-11 VITALS
OXYGEN SATURATION: 95 % | DIASTOLIC BLOOD PRESSURE: 61 MMHG | HEIGHT: 64 IN | WEIGHT: 161 LBS | SYSTOLIC BLOOD PRESSURE: 108 MMHG | HEART RATE: 75 BPM | BODY MASS INDEX: 27.49 KG/M2 | TEMPERATURE: 98.3 F

## 2024-12-11 DIAGNOSIS — B34.9 VIRAL SYNDROME: Primary | ICD-10-CM

## 2024-12-11 DIAGNOSIS — M85.88 OSTEOPENIA OF LUMBAR SPINE: ICD-10-CM

## 2024-12-11 DIAGNOSIS — E78.00 PURE HYPERCHOLESTEROLEMIA: ICD-10-CM

## 2024-12-11 DIAGNOSIS — Z00.01 ENCOUNTER FOR GENERAL ADULT MEDICAL EXAMINATION WITH ABNORMAL FINDINGS: ICD-10-CM

## 2024-12-11 DIAGNOSIS — K21.00 GASTROESOPHAGEAL REFLUX DISEASE WITH ESOPHAGITIS WITHOUT HEMORRHAGE: ICD-10-CM

## 2024-12-11 PROCEDURE — 99213 OFFICE O/P EST LOW 20 MIN: CPT | Performed by: INTERNAL MEDICINE

## 2024-12-11 PROCEDURE — 99397 PER PM REEVAL EST PAT 65+ YR: CPT | Performed by: INTERNAL MEDICINE

## 2024-12-11 NOTE — PROGRESS NOTES
Adult Annual Physical  Name: Phoebe Colbert      : 1955      MRN: [id number]  Encounter Provider: Chip Awad MD  Encounter Date: 2024   Encounter department: [facility] INTERNAL MEDICINE    Assessment & Plan  Viral syndrome         Pure hypercholesterolemia         Gastroesophageal reflux disease with esophagitis without hemorrhage         Encounter for general adult medical examination with abnormal findings         Osteopenia of lumbar spine         .    1. Viral syndrome      Plenty of hydration vitamin C and zinc advised.      2. Pure hypercholesterolemia      Patient following with cardiology and is on rosuvastatin.      3. Gastroesophageal reflux disease with esophagitis without hemorrhage      Continue non ulcerogenic diet and famotidine      4. Encounter for general adult medical examination with abnormal findings        5. Osteopenia of lumbar spine      Continue calcium and vitamin D.         Immunizations and preventive care screenings were discussed with patient today. Appropriate education was printed on patient's after visit summary.    Counseling:  Exercise: the importance of regular exercise/physical activity was discussed. Recommend exercise 3-5 times per week for at least 30 minutes.          History of Present Illness     Adult Annual Physical:  Patient presents for annual physical.     Diet and Physical Activity:  - Diet/Nutrition: well balanced diet and consuming 3-5 servings of fruits/vegetables daily.  - Exercise: 3-4 times a week on average, moderate cardiovascular exercise and 30-60 minutes on average.    Depression Screening:  - PHQ-2 Score: 0    General Health:  - Sleep: 7-8 hours of sleep on average.  - Hearing: normal hearing bilateral ears.  - Vision: no vision problems, goes for regular eye exams, wears glasses and most recent eye exam < 1 year ago.  - Dental: regular dental visits and brushes teeth twice daily.    /GYN Health:  - Follows with GYN: yes.    - Menopause: postmenopausal.   - History of STDs: no  - Contraception: menopause.      Advanced Care Planning:  - Has an advanced directive?: no    - Has a durable medical POA?: no    - ACP document given to patient?: no      Review of Systems   Constitutional:  Negative for appetite change, chills, fatigue and fever.   HENT:  Negative for sore throat and trouble swallowing.    Eyes:  Negative for redness.   Respiratory:  Negative for shortness of breath.    Cardiovascular:  Negative for chest pain and palpitations.   Gastrointestinal:  Negative for abdominal pain, constipation and diarrhea.   Genitourinary:  Negative for dysuria and hematuria.   Musculoskeletal:  Negative for back pain and neck pain.   Skin:  Negative for rash.   Neurological:  Negative for seizures, weakness and headaches.   Hematological:  Negative for adenopathy.   Psychiatric/Behavioral:  Negative for confusion. The patient is not nervous/anxious.      Pertinent Medical History         Medical History Reviewed by provider this encounter:     .  Current Outpatient Medications on File Prior to Visit   Medication Sig Dispense Refill    aspirin (ECOTRIN LOW STRENGTH) 81 mg EC tablet Take 1 tablet (81 mg total) by mouth daily 30 tablet 0    bisacodyl (DULCOLAX) 5 mg EC tablet Take 1 tablet (5 mg total) by mouth daily as needed for constipation 2 tablet 0    Calcium Carbonate-Vitamin D (CALCIUM 500/D PO) Take by mouth      Cholecalciferol (VITAMIN D) 2000 units CAPS Take by mouth      docusate sodium (COLACE) 100 mg capsule Take 100 mg by mouth in the morning      famotidine (PEPCID) 20 mg tablet Take 1 tablet (20 mg total) by mouth 2 (two) times a day 60 tablet 0    Multiple Vitamins-Minerals (MULTIVITAL-M PO) Take by mouth      rosuvastatin (CRESTOR) 40 MG tablet Take 1 tablet (40 mg total) by mouth daily 90 tablet 3    sertraline (ZOLOFT) 50 mg tablet TAKE ONE TABLET BY MOUTH DAILY 90 tablet 0    triamcinolone (KENALOG) 0.1 % cream Apply  "topically 2 (two) times a day 60 g 0    methylPREDNISolone 4 MG tablet therapy pack Use as directed on package (Patient not taking: Reported on 12/11/2024) 21 each 0     No current facility-administered medications on file prior to visit.      Social History     Tobacco Use    Smoking status: Never    Smokeless tobacco: Never   Vaping Use    Vaping status: Never Used   Substance and Sexual Activity    Alcohol use: Yes     Alcohol/week: 1.0 standard drink of alcohol     Types: 1 Cans of beer per week     Comment: weekly    Drug use: No    Sexual activity: Yes     Partners: Male       Objective   /61 (BP Location: Left arm, Patient Position: Sitting, Cuff Size: Adult)   Pulse 75   Temp 98.3 °F (36.8 °C) (Temporal)   Ht 5' 4\" (1.626 m)   Wt 73 kg (161 lb)   SpO2 95%   BMI 27.64 kg/m²     Physical Exam  Vitals and nursing note reviewed.   Constitutional:       General: She is not in acute distress.     Appearance: She is well-developed.   HENT:      Head: Normocephalic and atraumatic.   Eyes:      Conjunctiva/sclera: Conjunctivae normal.   Cardiovascular:      Rate and Rhythm: Normal rate and regular rhythm.      Heart sounds: No murmur heard.  Pulmonary:      Effort: Pulmonary effort is normal. No respiratory distress.      Breath sounds: Normal breath sounds.   Abdominal:      Palpations: Abdomen is soft.      Tenderness: There is no abdominal tenderness.   Musculoskeletal:         General: No swelling.      Cervical back: Neck supple.   Skin:     General: Skin is warm and dry.      Capillary Refill: Capillary refill takes less than 2 seconds.   Neurological:      Mental Status: She is alert.   Psychiatric:         Mood and Affect: Mood normal.       "

## 2024-12-17 ENCOUNTER — SOCIAL WORK (OUTPATIENT)
Dept: BEHAVIORAL/MENTAL HEALTH CLINIC | Facility: CLINIC | Age: 69
End: 2024-12-17
Payer: COMMERCIAL

## 2024-12-17 DIAGNOSIS — F41.9 ANXIETY: Primary | ICD-10-CM

## 2024-12-17 PROCEDURE — 90834 PSYTX W PT 45 MINUTES: CPT | Performed by: SOCIAL WORKER

## 2024-12-17 NOTE — PSYCH
"Behavioral Health Psychotherapy Progress Note    Psychotherapy Provided: Individual Psychotherapy     Encounter Diagnoses   Name Primary?   • Anxiety Yes       Goals addressed in session: Goal 1     DATA: Phoebe spoke today about her feelings of regret that she was not aware of how unhappy her daughter was during her previous marriage.  She was able to recognize the need to forgive herself for this and that her daughter has also forgiven her.  Stage of change for addressing substance use diagnoses: No substance use/Not applicable    ASSESSMENT:  Phoebe Colbert presents with a Euthymic/ normal mood. She once again demonstrated insight and healthier boundaries which is indicative of her growth through therapy.  her affect is Normal range and intensity, which is congruent, with her mood and the content of the session. The client has made progress on their goals.     Phoebe Colbert presents with a minimal risk of suicide, minimal risk of self-harm, and none risk of harm to others.    For any risk assessment that surpasses a \"low\" rating, a safety plan must be developed.    A safety plan was indicated: no  If yes, describe in detail     PLAN: Between sessions, Phoebe Colbert will discuss with her  ways that they can share their concerns with each other and their respective therapists.  At the next session, the therapist will use Supportive Psychotherapy to address the above in further detail.    Behavioral Health Treatment Plan and Discharge Planning: Phoebe Colbert is aware of and agrees to continue to work on their treatment plan. They have identified and are working toward their discharge goals. yes    Visit start and stop times:        12/17/24  Start Time: 1400  Stop Time: 1450  Total Visit Time: 50 minutes  "

## 2025-01-21 ENCOUNTER — SOCIAL WORK (OUTPATIENT)
Dept: BEHAVIORAL/MENTAL HEALTH CLINIC | Facility: CLINIC | Age: 70
End: 2025-01-21

## 2025-01-21 DIAGNOSIS — F41.9 ANXIETY: Primary | ICD-10-CM

## 2025-01-21 PROCEDURE — PBNCHG PB NO CHARGE PLACEHOLDER: Performed by: SOCIAL WORKER

## 2025-01-22 NOTE — PSYCH
"Behavioral Health Psychotherapy Progress Note    Psychotherapy Provided: Individual Psychotherapy     Encounter Diagnoses   Name Primary?   • Anxiety Yes         Goals addressed in session: Goal 1     DATA: Phoebe spoke today about her feelings re: her 's limited interpersonal connection and ways that his mental health struggles have contributed to this.  She expressed excitement about her daughter's pregnancy and what the \"next stage\" of her life will be like.    Stage of change for addressing substance use diagnoses: No substance use/Not applicable    ASSESSMENT:  Phoebe Colbert presents with a Euthymic/ normal mood. She once again demonstrated insight and healthier boundaries which is indicative of her growth through therapy.  her affect is Normal range and intensity, which is congruent, with her mood and the content of the session. The client has made progress on their goals.     Phoebe Colbert presents with a minimal risk of suicide, minimal risk of self-harm, and none risk of harm to others.    For any risk assessment that surpasses a \"low\" rating, a safety plan must be developed.    A safety plan was indicated: no  If yes, describe in detail     PLAN: Between sessions, Phoebe Colbert will discuss with her  ways that they can continue to share their concerns with each other and their respective therapists.  At the next session, the therapist will use Supportive Psychotherapy to address the above in further detail.    Behavioral Health Treatment Plan and Discharge Planning: Phoebe Colbert is aware of and agrees to continue to work on their treatment plan. They have identified and are working toward their discharge goals. yes    Visit start and stop times:        1/21/25  Start Time: 1300  Stop Time: 1315  Total Visit Time: 15 minutes  "

## 2025-02-19 ENCOUNTER — SOCIAL WORK (OUTPATIENT)
Dept: BEHAVIORAL/MENTAL HEALTH CLINIC | Facility: CLINIC | Age: 70
End: 2025-02-19
Payer: COMMERCIAL

## 2025-02-19 DIAGNOSIS — F41.9 ANXIETY: Primary | ICD-10-CM

## 2025-02-19 PROCEDURE — 90834 PSYTX W PT 45 MINUTES: CPT | Performed by: SOCIAL WORKER

## 2025-02-19 NOTE — PSYCH
"Behavioral Health Psychotherapy Progress Note    Psychotherapy Provided: Individual Psychotherapy     Encounter Diagnoses   Name Primary?   • Anxiety Yes           Goals addressed in session: Goal 1     DATA: Phoebe spoke today about her feelings re: her 's resistance to accepting and agreeing to their daughter's boundaries. She again expressed excitement about her daughter's pregnancy and her intention to be very involved and supportive of her daughter both prior to and following the baby's birth.    Stage of change for addressing substance use diagnoses: No substance use/Not applicable    ASSESSMENT:  Phoebe Colbert presents with a Euthymic/ normal mood. Despite her annoyance and frustration with her , she once again demonstrated insight and healthier boundaries which is indicative of her growth through therapy.  her affect is Normal range and intensity, which is congruent, with her mood and the content of the session. The client has made progress on their goals.     Phoebe Colbert presents with a minimal risk of suicide, minimal risk of self-harm, and none risk of harm to others.    For any risk assessment that surpasses a \"low\" rating, a safety plan must be developed.    A safety plan was indicated: no  If yes, describe in detail     PLAN: Between sessions, Phoebe Colbert will discuss with her  ways that  her 's choices belong solely his and that she will make her own choices that are based on her intentions and desires. At the next session, the therapist will use Supportive Psychotherapy to address the above in further detail.    Behavioral Health Treatment Plan and Discharge Planning: Phoebe Colbert is aware of and agrees to continue to work on their treatment plan. They have identified and are working toward their discharge goals. yes    Visit start and stop times:        2/1925  Start Time: 1400  Stop Time: 1450  Total Visit Time: 50 minutes  "

## 2025-02-28 DIAGNOSIS — K21.00 GASTROESOPHAGEAL REFLUX DISEASE WITH ESOPHAGITIS WITHOUT HEMORRHAGE: ICD-10-CM

## 2025-02-28 RX ORDER — FAMOTIDINE 20 MG/1
20 TABLET, FILM COATED ORAL 2 TIMES DAILY
Qty: 180 TABLET | Refills: 1 | Status: SHIPPED | OUTPATIENT
Start: 2025-02-28

## 2025-02-28 NOTE — TELEPHONE ENCOUNTER
Reason for call:   [x] Refill   [] Prior Auth  [] Other:     Office:   [x] PCP/Provider - Chip Awad MD   [] Specialty/Provider -     Medication:     famotidine (PEPCID) 20 mg tablet       Dose/Frequency: 20 mg, Oral, 2 times daily     Quantity: 60    Pharmacy: [facility] - [place], [place] - [address]     Does the patient have enough for 3 days?   [x] Yes   [] No - Send as HP to POD

## 2025-03-19 ENCOUNTER — SOCIAL WORK (OUTPATIENT)
Dept: BEHAVIORAL/MENTAL HEALTH CLINIC | Facility: CLINIC | Age: 70
End: 2025-03-19
Payer: COMMERCIAL

## 2025-03-19 DIAGNOSIS — F41.9 ANXIETY: Primary | ICD-10-CM

## 2025-03-19 PROCEDURE — 90834 PSYTX W PT 45 MINUTES: CPT | Performed by: SOCIAL WORKER

## 2025-03-19 NOTE — PSYCH
"Behavioral Health Psychotherapy Progress Note    Psychotherapy Provided: Individual Psychotherapy     Encounter Diagnoses   Name Primary?   • Anxiety Yes             Goals addressed in session: Goal 1     DATA: Phoebe spoke today about her feelings re: her daughter's pregnancy and her plan to be available to assist her daughter with the care of her child following the baby's birth.   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   Stage of change for addressing substance use diagnoses: No substance use/Not applicable    ASSESSMENT:  Phoebe Colbert presents with a Euthymic/ normal mood. Demi garner described herself as neurotic today and appears to recognize that she is more anxiously preoccupied than she admits during therapy sessions. her affect is Normal range and intensity, which is congruent, with her mood and the content of the session. The client has made progress on their goals.     Phoebe Colbert presents with a minimal risk of suicide, minimal risk of self-harm, and none risk of harm to others.    For any risk assessment that surpasses a \"low\" rating, a safety plan must be developed.    A safety plan was indicated: no  If yes, describe in detail     PLAN: Between sessions, Phoebe Colbert will finish reading the \"let them\" book.  . At the next session, the therapist will use Supportive Psychotherapy to address the above in further detail.    Behavioral Health Treatment Plan and Discharge Planning: Phoebe Colbert is aware of and agrees to continue to work on their treatment plan. They have identified and are working toward their discharge goals. yes    Visit start and stop times:        03/192/5  Start Time: 1500  Stop Time: 1545  Total Visit Time: 45 minutes  "

## 2025-04-10 ENCOUNTER — TELEMEDICINE (OUTPATIENT)
Dept: INTERNAL MEDICINE CLINIC | Facility: CLINIC | Age: 70
End: 2025-04-10
Payer: COMMERCIAL

## 2025-04-10 VITALS
HEIGHT: 64 IN | WEIGHT: 160 LBS | SYSTOLIC BLOOD PRESSURE: 112 MMHG | HEART RATE: 72 BPM | BODY MASS INDEX: 27.31 KG/M2 | TEMPERATURE: 99 F | DIASTOLIC BLOOD PRESSURE: 72 MMHG

## 2025-04-10 DIAGNOSIS — J22 LOWER RESPIRATORY TRACT INFECTION DUE TO COVID-19 VIRUS: Primary | ICD-10-CM

## 2025-04-10 DIAGNOSIS — U07.1 LOWER RESPIRATORY TRACT INFECTION DUE TO COVID-19 VIRUS: Primary | ICD-10-CM

## 2025-04-10 PROCEDURE — 99214 OFFICE O/P EST MOD 30 MIN: CPT | Performed by: INTERNAL MEDICINE

## 2025-04-10 RX ORDER — NIRMATRELVIR AND RITONAVIR 300-100 MG
3 KIT ORAL 2 TIMES DAILY
Qty: 30 TABLET | Refills: 0 | Status: SHIPPED | OUTPATIENT
Start: 2025-04-10 | End: 2025-04-15

## 2025-04-10 NOTE — PROGRESS NOTES
"Virtual  visit   for   treatment  of  COVID  lower  respiratory  tract  infection.  Patient  tested  positive   and  has symptoms of  cough, fever and  myalgias .  She  does not  look in  distress in the  video  and  is  talking  without any  difficulty   except  for  cough  .The  visit  lasted    15 minutes   in  the  room with  closed  doors   Prescription  for  Paxlovid  prescribed.  Virtual Regular VisitName: Phoebe Colbert      : 1955      MRN: [id number]  Encounter Provider: Jose Luis Reyna MD  Encounter Date: 4/10/2025   Encounter department: [facility] INTERNAL MEDICINE [address]  :Assessment & Plan  Lower respiratory tract infection due to COVID-19 virus    Orders:  •  nirmatrelvir & ritonavir (Paxlovid, 300/100,) tablet therapy pack; Take 3 tablets by mouth 2 (two) times a day for 5 days Take 2 nirmatrelvir tablets + 1 ritonavir tablet together per dose      Lower respiratory tract infection due to COVID-19 virus               History of Present Illness     HPI  Review of Systems    Objective   /72 (BP Location: Left arm, Patient Position: Sitting)   Pulse 72   Temp 99 °F (37.2 °C)   Ht 5' 4\" (1.626 m)   Wt 72.6 kg (160 lb)   BMI 27.46 kg/m²     Physical Exam    Administrative Statements   Encounter provider Jose Luis Reyna MD    The Patient is located at Home and in the following state in which I hold an active license [place].    The patient was identified by name and date of birth. Phoebe Colbert was informed that this is a telemedicine visit and that the visit is being conducted through the Epic Embedded platform. She agrees to proceed..  My office door was closed. No one else was in the room.  She acknowledged consent and understanding of privacy and security of the video platform. The patient has agreed to participate and understands they can discontinue the visit at any time.    I have spent a total time of 20 minutes in caring for this patient on the day of " the visit/encounter including Patient and family education, Importance of tx compliance, and Risk factor reductions, not including the time spent for establishing the audio/video connection.

## 2025-04-11 ENCOUNTER — TELEPHONE (OUTPATIENT)
Age: 70
End: 2025-04-11

## 2025-04-11 NOTE — TELEPHONE ENCOUNTER
Phoebe is calling for a doctor's excuse. She was in yesterday and was diagnose with Covid. She is on paxlovid and last time she had to wait 5 days to go back to work. She needs a note for work saying she will return 4/15/25 Tuesday. She said she will  the note on Monday. Please call her to let her know it is ready for .

## 2025-04-23 ENCOUNTER — SOCIAL WORK (OUTPATIENT)
Dept: BEHAVIORAL/MENTAL HEALTH CLINIC | Facility: CLINIC | Age: 70
End: 2025-04-23
Payer: COMMERCIAL

## 2025-04-23 DIAGNOSIS — F41.9 ANXIETY: Primary | ICD-10-CM

## 2025-04-23 PROCEDURE — 90834 PSYTX W PT 45 MINUTES: CPT | Performed by: SOCIAL WORKER

## 2025-04-23 NOTE — PSYCH
"Behavioral Health Psychotherapy Progress Note    Psychotherapy Provided: Individual Psychotherapy     Encounter Diagnoses   Name Primary?   • Anxiety Yes               Goals addressed in session: Goal 1     DATA: Phoebe spoke today about her feelings re: changes in family dynamics over the past month including her  and daughter's contact and her  and sister's contact.  She expressed relief that these relationships have improved as she awaits the birth of her first grandchild.    Stage of change for addressing substance use diagnoses: No substance use/Not applicable    ASSESSMENT:  Phoebe Colbert presents with a Euthymic/ normal mood. Demi presented as considerably less anxious today and proud of herself for the improved boundaries that she has established for herself. her affect is Normal range and intensity, which is congruent, with her mood and the content of the session. The client has made progress on their goals.     Phoebe Colbert presents with a minimal risk of suicide, minimal risk of self-harm, and none risk of harm to others.    For any risk assessment that surpasses a \"low\" rating, a safety plan must be developed.    A safety plan was indicated: no  If yes, describe in detail     PLAN: Between sessions, Phoebe Colbert will finish reading the \"let them\" book.  . At the next session, the therapist will use Supportive Psychotherapy to address the above in further detail.    Behavioral Health Treatment Plan and Discharge Planning: Phoebe Colbert is aware of and agrees to continue to work on their treatment plan. They have identified and are working toward their discharge goals. yes    Visit start and stop times:        04/23/25  Start Time: 1500  Stop Time: 1552  Total Visit Time: 52 minutes  "

## 2025-05-04 ENCOUNTER — OFFICE VISIT (OUTPATIENT)
Dept: URGENT CARE | Age: 70
End: 2025-05-04
Payer: COMMERCIAL

## 2025-05-04 VITALS
OXYGEN SATURATION: 99 % | RESPIRATION RATE: 16 BRPM | SYSTOLIC BLOOD PRESSURE: 112 MMHG | HEART RATE: 72 BPM | DIASTOLIC BLOOD PRESSURE: 55 MMHG | TEMPERATURE: 97.8 F

## 2025-05-04 DIAGNOSIS — J02.9 ACUTE PHARYNGITIS, UNSPECIFIED ETIOLOGY: Primary | ICD-10-CM

## 2025-05-04 DIAGNOSIS — J06.9 UPPER RESPIRATORY TRACT INFECTION, UNSPECIFIED TYPE: ICD-10-CM

## 2025-05-04 PROCEDURE — 99213 OFFICE O/P EST LOW 20 MIN: CPT | Performed by: FAMILY MEDICINE

## 2025-05-04 RX ORDER — AZITHROMYCIN 250 MG/1
TABLET, FILM COATED ORAL
Qty: 6 TABLET | Refills: 0 | Status: SHIPPED | OUTPATIENT
Start: 2025-05-04 | End: 2025-05-08

## 2025-05-04 NOTE — PATIENT INSTRUCTIONS
Patient Education     Sore throat in adults   The Basics   Written by the doctors and editors at [facility]   What causes sore throat? -- Sore throat is usually caused by an infection. Two types of germs can cause it: viruses and bacteria.  People who have a sore throat caused by a virus do not usually need to see a doctor or nurse. But if you think that you might have been exposed to COVID-19, or if COVID-19 is common where you live, ask your doctor or nurse if you should be tested.  People who have a sore throat caused by bacteria might need to see a doctor or nurse. They might have a type of infection called strep throat. Only about 1 in 10 adults who seek medical care for sore throat have strep throat.  How can I tell if my sore throat is caused by a virus or strep throat? -- It is hard to tell the difference. But there are some clues to look for.  People who have a sore throat caused by a virus usually have other symptoms, such as:   Stuffy or runny nose   Itchy or red eyes   Cough  People who have COVID-19 can have a sore throat as their only symptom. Or they can have other symptoms such as fever, cough, trouble breathing, and problems with their sense of smell or taste. In most cases, the only way to know for sure if you have COVID-19 is to get tested.  People who have strep throat do not usually have a cough, runny nose, or itchy or red eyes. They might have:   Severe throat pain   Fever (temperature higher than 100.4°F or 38°C)   Swollen glands in the neck  If you think that you have strep throat, the doctor or nurse can easily check. They can take a sample from the back of your throat and test it for the bacteria that cause strep throat.  Do I need antibiotics? -- If you have an infection caused by a virus, you do not need antibiotics. But if you have strep throat, you should get antibiotics. Most people with strep throat get better without antibiotics, but doctors and nurses often prescribe them. This is  "because antibiotics often can prevent other problems that might be caused by strep throat. Plus, antibiotics can reduce the symptoms of strep throat and prevent you from spreading it to other people.  What can I do to feel better? -- To relieve the pain of sore throat, you can:   Take over-the-counter pain medicine - Acetaminophen (sample brand name: Tylenol) or ibuprofen (sample brand names: Advil, Motrin) can help with throat pain.   Use sore throat lozenges or sprays - Using medicated sore throat lozenges or throat sprays can temporarily reduce throat pain.   Suck on hard candies, ice chips, or ice pops.   Gargle with salt water - Some people find that this helps with throat pain.   Use a cool mist humidifier - This adds moisture to the air to keep the throat from getting too dry and might help with pain.   Avoid smoking or being around people who are smoking - Smoke can make throat pain worse.  When can I go back to work or school? -- If you have strep throat, wait 1 day after starting antibiotics. By then, you will be a lot less likely to spread the infection to others.  If you have COVID-19, stay home from work or school and \"self-isolate\" until your doctor or nurse tells you it's safe to stop. Self-isolation means staying apart from other people, even the people you live with. When you can stop self-isolation depends on how long it has been since you had symptoms, and in some cases, whether you have had a negative test (showing that the virus is no longer in your body).  If you have a sore throat that is not due to strep throat or COVID-19, you can go back to your usual activities as soon as you feel well. But it's still important to wash your hands often and cover your mouth if you cough.  When should I call the doctor? -- Call your doctor or nurse if:   You have a fever of at least 101°F (38.4°C).   Your throat pain is severe within the first 2 days, or does not start to improve within 5 to 7 days.   You " are having trouble getting enough to eat or drink.   You got antibiotics but still have symptoms after finishing them.  Call for an ambulance (in the [place] and [place], call 9-1-1) or go to the emergency department if you:   Have trouble breathing   Are drooling because you cannot swallow your saliva   Have swelling of the neck or tongue   Cannot move your neck, or have trouble opening your mouth  What can I do to prevent getting a sore throat again? -- Wash your hands often with soap and water (figure 1). It is one of the best ways to prevent the spread of infection.  All topics are updated as new evidence becomes available and our peer review process is complete.  This topic retrieved from [employer] on: Mar 13, 2024.  Topic 25341 Version 23.0  Release: 32.2.4 - C32.71  © 2024 UpToDate, Inc. and/or its affiliates. All rights reserved.  figure 1: How to wash your hands     Wet your hands with clean water, and apply a small amount of soap. Lather and rub hands together for at least 20 seconds. Clean your wrists, palms, backs of your hands, between your fingers, tips of your fingers, thumbs, and under and around your nails. Rinse well, and dry your hands using a clean towel.  Graphic 702293 Version 7.0  Consumer Information Use and Disclaimer   Disclaimer: This generalized information is a limited summary of diagnosis, treatment, and/or medication information. It is not meant to be comprehensive and should be used as a tool to help the user understand and/or assess potential diagnostic and treatment options. It does NOT include all information about conditions, treatments, medications, side effects, or risks that may apply to a specific patient. It is not intended to be medical advice or a substitute for the medical advice, diagnosis, or treatment of a health care provider based on the health care provider's examination and assessment of a patient's specific and unique circumstances. Patients must speak with a health care  provider for complete information about their health, medical questions, and treatment options, including any risks or benefits regarding use of medications. This information does not endorse any treatments or medications as safe, effective, or approved for treating a specific patient. UpToDate, Inc. and its affiliates disclaim any warranty or liability relating to this information or the use thereof.The use of this information is governed by the Terms of Use, available at https://www.woltersYeboluwer.com/en/know/clinical-effectiveness-terms. 2024© UpToDate, Inc. and its affiliates and/or licensors. All rights reserved.  Copyright   © 2024 UpToDate, Inc. and/or its affiliates. All rights reserved.

## 2025-05-04 NOTE — PROGRESS NOTES
[facility]        NAME: Phoebe Colbert is a 70 y.o. female  : 1955    MRN: [id number]  DATE: May 4, 2025  TIME: 10:42 AM    Assessment and Plan   Acute pharyngitis, unspecified etiology [J02.9]  1. Acute pharyngitis, unspecified etiology  azithromycin (ZITHROMAX) 250 mg tablet      2. 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    If tests have been performed at [facility], our office will contact you with results if changes need to be made to the care plan discussed with you at the visit.  [facility] on [facility]    Chief Complaint     Chief Complaint   Patient presents with   • Cold Like Symptoms     For two days patient has had chest congestion and fatigue. She notes worsening symptoms with fever, body aches, and SOB. She is losing her voice and notes b/l ear pain . Hx of covid 2-3 weeks ago.   • Sore Throat         History of Present Illness     Phoebe Colbert is a 70 y.o. female  who presented to [facility] today with a h/o sx that started about 5 days ago with nasal congestion and sore throat.  The sx have progressed and she now also has hoarseness with increased pain in her throat.  Cough and brings up a thick yellow green phlegm.  Pt was traveling and sister was also sick and diagnosed with Bronchitis.  + chills, temp 99 F at home  No associated nausea, vomiting, diarrhea, blurred vision, chest pain.  Nonsmoker  Taking an otc cold/cough medication.  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 Pertinent negatives include no diarrhea, headaches, shortness of breath or vomiting.         Review of Systems    Review of Systems   Constitutional:  Negative for chills and fever.   HENT:  Positive for congestion, sore throat, trouble swallowing and voice change. Negative for rhinorrhea.    Respiratory:  Positive for cough. Negative for shortness of breath.    Cardiovascular:  Negative for chest pain.   Gastrointestinal:  Negative for diarrhea, nausea and vomiting.   Skin:  Negative for rash.   Neurological:  Negative for dizziness and headaches.         Current Medications       Current Outpatient Medications:   •  aspirin (ECOTRIN LOW STRENGTH) 81 mg EC tablet, Take 1 tablet (81 mg total) by mouth daily, Disp: 30 tablet, Rfl: 0  •  azithromycin (ZITHROMAX) 250 mg tablet, Take 2 tablets today then 1 tablet daily x 4 days, Disp: 6 tablet, Rfl: 0  •  bisacodyl (DULCOLAX) 5 mg EC tablet, Take 1 tablet (5 mg total) by mouth daily as needed for constipation, Disp: 2 tablet, Rfl: 0  •  Calcium Carbonate-Vitamin D (CALCIUM 500/D PO), Take by mouth, Disp: , Rfl:   •  Cholecalciferol (VITAMIN D) 2000 units CAPS, Take by mouth, Disp: , Rfl:   •  docusate sodium (COLACE) 100 mg capsule, Take 100 mg by mouth in the morning, Disp: , Rfl:   •  famotidine (PEPCID) 20 mg tablet, Take 1 tablet (20 mg total) by mouth 2 (two) times a day, Disp: 180 tablet, Rfl: 1  •  Multiple Vitamins-Minerals (MULTIVITAL-M PO), Take by mouth, Disp: , Rfl:   •  rosuvastatin (CRESTOR) 40 MG tablet, Take 1 tablet (40 mg total) by mouth daily, Disp: 90 tablet, Rfl: 3  •  sertraline (ZOLOFT) 50 mg tablet, TAKE ONE TABLET BY MOUTH DAILY, Disp: 90 tablet, Rfl: 0  •  triamcinolone (KENALOG) 0.1 % cream, Apply topically 2 (two) times a day, Disp: 60 g, Rfl: 0    Current Allergies     Allergies as of 05/04/2025 - Reviewed 05/04/2025   Allergen Reaction Noted   • Levaquin [levofloxacin] Swelling and Other (See Comments) 05/20/2016   • Quinolones Other (See Comments) 05/20/2016            The following portions of the patient's history were reviewed and updated as  appropriate: allergies, current medications, past family history, past medical history, past social history, past surgical history and problem list.     Past Medical History:   Diagnosis Date   • Anxiety    • Body mass index 27.0-27.9, adult    • Colon cancer screening    • Colon polyp    • Depression    • GERD (gastroesophageal reflux disease)     takes tums occasionaly   • GERD (gastroesophageal reflux disease)    • Hyperlipidemia    • Viral intestinal infection        Past Surgical History:   Procedure Laterality Date   • CARPAL TUNNEL RELEASE Bilateral    • CHOLECYSTECTOMY  08/2011   • COLONOSCOPY     • COLONOSCOPY N/A 10/18/2018    Procedure: COLONOSCOPY;  Surgeon: Faisal Miller MD;  Location: [facility];  Service: Gastroenterology   • CYSTOSCOPY     • NV CMBND ANTERPOST COLPORRAPHY W/CYSTO N/A 5/23/2016    Procedure: COLPORRHAPHY ANTERIOR POSTERIOR WITH GRAFT; ENTEROCELE REPAIR;  Surgeon: Micha Bryant MD;  [facility];  Service: UroGynecology          • NV COLONOSCOPY FLX DX W/COLLJ SPEC WHEN PFRMD N/A 10/17/2018    Procedure: COLONOSCOPY;  Surgeon: Faisal Miller MD;  Location: [facility];  Service: Gastroenterology   • NV COLPOPEXY VAGINAL EXTRAPERITONEAL APPROACH N/A 5/23/2016    Procedure: COLPOPEXY VAGINAL EXTRAPERITONEAL (VEC);  Surgeon: Micha Bryant MD;  [facility];  Service: UroGynecology          • NV CYSTOURETHROSCOPY N/A 5/23/2016    Procedure: CYSTOSCOPY;  Surgeon: Micha Bryant MD;  [facility];  Service: UroGynecology          • NV SLING OPERATION STRESS INCONTINENCE N/A 5/23/2016    Procedure: INSERTION PUBOVAGINAL SLING ;  Surgeon: Micha Bryant MD;  [facility];  Service: UroGynecology          • TUBAL LIGATION         Family History   Problem Relation Age of Onset   • Endometrial cancer Mother 68   • COPD Mother    • Coronary artery disease Father    • Heart disease Father    • No Known Problems Sister    • No Known Problems Sister    • Hypertension  Brother    • No Known Problems Son    • No Known Problems Daughter    • No Known Problems Maternal Grandmother    • No Known Problems Maternal Grandfather    • No Known Problems Paternal Grandmother    • No Known Problems Paternal Grandfather    • No Known Problems Maternal Aunt    • No Known Problems Maternal Aunt    • No Known Problems Maternal Aunt    • No Known Problems Maternal Aunt    • No Known Problems Paternal Aunt      Medications have been verified.    Objective   /55   Pulse 72   Temp 97.8 °F (36.6 °C)   Resp 16   SpO2 99%   No LMP recorded. Patient is postmenopausal.    Physical Exam     Physical Exam  Vitals and nursing note reviewed.   Constitutional:       Appearance: She is well-developed.   HENT:      Head: Normocephalic and atraumatic.      Right Ear: Ear canal and external ear normal. A middle ear effusion is present.      Left Ear: Ear canal and external ear normal. A middle ear effusion is present.      Nose: Congestion present.      Mouth/Throat:      Pharynx: Posterior oropharyngeal erythema present.   Cardiovascular:      Rate and Rhythm: Normal rate and regular rhythm.   Pulmonary:      Effort: Pulmonary effort is normal. No respiratory distress.      Breath sounds: Normal breath sounds.   Neurological:      Mental Status: She is alert and oriented to person, place, and time.   Psychiatric:         Mood and Affect: Mood normal.         Behavior: Behavior normal.

## 2025-05-04 NOTE — LETTER
May 4, 2025     Patient: Phoebe Colbert   YOB: 1955   Date of Visit: 5/4/2025       To Whom it May Concern:    Phoebe Colbert was seen at the [facility] on 5/4/2025. She may return to work on 5/7/2025.    If you have any questions or concerns, please don't hesitate to call.         Sincerely,          Heidi Tellez MD        CC: No Recipients

## 2025-05-14 ENCOUNTER — OFFICE VISIT (OUTPATIENT)
Dept: CARDIOLOGY CLINIC | Facility: CLINIC | Age: 70
End: 2025-05-14
Payer: COMMERCIAL

## 2025-05-14 VITALS
BODY MASS INDEX: 28.09 KG/M2 | DIASTOLIC BLOOD PRESSURE: 60 MMHG | SYSTOLIC BLOOD PRESSURE: 96 MMHG | HEIGHT: 64 IN | OXYGEN SATURATION: 98 % | HEART RATE: 70 BPM | WEIGHT: 164.5 LBS

## 2025-05-14 DIAGNOSIS — E78.5 HYPERLIPIDEMIA, UNSPECIFIED HYPERLIPIDEMIA TYPE: ICD-10-CM

## 2025-05-14 DIAGNOSIS — Z82.49 FAMILY HISTORY OF EARLY CAD: ICD-10-CM

## 2025-05-14 DIAGNOSIS — E66.3 OVERWEIGHT (BMI 25.0-29.9): ICD-10-CM

## 2025-05-14 DIAGNOSIS — R93.1 AGATSTON CORONARY ARTERY CALCIUM SCORE BETWEEN 100 AND 199: Primary | ICD-10-CM

## 2025-05-14 DIAGNOSIS — R73.03 PREDIABETES: ICD-10-CM

## 2025-05-14 PROCEDURE — 99214 OFFICE O/P EST MOD 30 MIN: CPT | Performed by: INTERNAL MEDICINE

## 2025-05-14 NOTE — PROGRESS NOTES
Cardiology Follow Up Visit       Phoebe Colbert   [id number]  1955      HEART [facility] VASCULAR [facility]  [address]  [place] [id number]      Physician Requesting Consult:   Chip Awad MD    Reason for Follow Up Visit / Principal Problem:  Mrs. Phoebe Colbert is a 70 y.o. female and never smoker with a PMH significant for but not limited to CAD (, 2020), HLD, Prediabetes (resolved), GERD, DAVID and Overweight.  She presents to clinic for routine Follow Up.      Assessment & Plan   CAD/Coronary Artery Calcification,  on 11/22/20    Tolerating medical mgmt without progressive symptoms, Exercise SE without evidence of ischemia on 05/09/23, Now ascending stairs regularly without recurrent symptoms  Cont GDMT for CAD on asa/statin, Cont risk factor reduction, Cont diet/lifestyle optimization  Monitor routinely for now, Record BP/HR and log if symptoms return, ED/Clinic precautions reviewed    HLD, recent FLP:  / TRI 65 / HDL 65 / LDL 88 on 08/14/24, 4.9% 10-Year ASCVD Estimated Risk  Tolerating statin without significant adverse reactions  Cont current medication regimen, cont counseling on diet and lifestyle modification  Monitor FLP routinely as ordered by PCP, will follow peripherally    Prediabetes, most recent A1c 5.7 on 04/09/24, up from 5.6 on 03/31/23  No prior medical mgmt, diet controlled  Check A1c per PCP, Cont counseling on diet and lifestyle changes  Monitor labs as noted above, will reassess on next visit    Overweight, Body mass index is 28.24 kg/m².  Weight has been down-trending overall: down 10 lbs over the last year  Cont to review the importance of diet and lifestyle modification, cont ongoing workup and treatment by [facility]  Will monitor routinely at this time    Discussion / Summary:  Precautions and reasons to call our office or proceed to ER were discussed in detail. Patient expressed understanding and questions  were answered. Plan on follow up in-clinic in 6 months.    Office Visit Diagnosis:  1. Agatston coronary artery calcium score between 100 and 199        2. Hyperlipidemia, unspecified hyperlipidemia type        3. Prediabetes        4. Overweight (BMI 25.0-29.9)        5. Family history of early CAD            Interval History:  Mrs. Colbert was originally seen in clinic on 23 to establish care. 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  She'd noted a 20-lb weight gain during the COVID pandemic, and felt sluggish overall.  Since seeing her PCP, she'd noted occasional bouts of JUDD when ascending stairs and when singing at [employer]. She pointed out that she was able to complete treadmill workouts without similar dyspnea. Of note, she previously denied any recent cardiac hospitalizations, prior cardiac history beyond her elevated CCS. She reported a relative with a MI at age 52, and she noted that all her siblings were on statins. She later reported her mother had a CVA at 73 yo and  at age 78.  We proceeded with an SE that demonstrated no LV Dysfunction or RWMA after 9 min of Exercise, achieving 10.1 METs. We also increased her statin to a high-intensity dose at 20 mg, which she tolerated well.  During our follow up visit on 23, her repeat FLP demonstrated excellent response and her A1c returned to normal limits. She was concerned about weight control, and she was referred to  Weight Mgmt. During our 04/10/24 visit, she'd done well overall though she continued to struggle with weight loss. Her FLP/A1c though stable had up trended slightly, and we adjusted her statin therapy.    During our 24 follow up visit, she noted improvement in weight management with slow and steady loss. Her repeat LDL was down to 88 mg/dL. She  was planning on making adjustments in her work schedule to accomodate more time for self care. With dedicated efforts to ascend stairs at work, she continued to note improvement in johnson while ascending stairs.     Since our last visit, she states that she has been well overall. She recently recovered from COVID in April and has returned to her baseline. She currently denies any associated cp, diaphoresis, n/v, sob at rest, palpitations, near syncope, syncope, orthopnea, pnd, or ble edema. She notes improved control of her diet and exercise habits.  She reports a diet that is well balanced, closely monitored salt intake, water intake of 64 oz/day, caffeine intake of 1 cup/day, and exercise that consists of  more than 10k steps on work days and treadmill sessions of now 45 min 3-4/per week on off days. She is otherwise compliant with medications, checks BP occasionally but does not maintain a detailed BP log.  Of note, the patient's cardiac risk factor(s) include: advanced age, family history of premature ASCVD, dyslipidemia, and  delivery.       Subjective   Review of Systems   Constitutional: Negative for chills and fever.   HENT:  Negative for congestion and sore throat.    Eyes:  Negative for blurred vision and pain.   Cardiovascular:  Positive for dyspnea on exertion (improving with conditioning at work). Negative for chest pain, claudication, leg swelling, near-syncope, orthopnea, palpitations, paroxysmal nocturnal dyspnea and syncope.   Respiratory:  Negative for cough, shortness of breath, sleep disturbances due to breathing, snoring and wheezing.    Hematologic/Lymphatic: Negative for bleeding problem. Does not bruise/bleed easily.   Skin:  Negative for itching and rash.   Musculoskeletal:  Positive for arthritis (mild, nothing new) and back pain (stable, chronic). Negative for joint swelling.   Gastrointestinal:  Positive for constipation (manageable, otc once a week if needed). Negative for abdominal  pain, diarrhea, nausea and vomiting.   Genitourinary:  Negative for dysuria and hematuria.   Neurological:  Negative for numbness and paresthesias.   Psychiatric/Behavioral:  Negative for depression. The patient is nervous/anxious (well controlled with zoloft).          The following portions of the patient's history were reviewed and updated as appropriate: past medical history, past social history, past family history, past surgical history, current medications, allergies, past surgical history and problem list.  Past Medical History:   Diagnosis Date   • Anxiety    • Body mass index 27.0-27.9, adult    • Colon cancer screening    • Colon polyp    • Depression    • GERD (gastroesophageal reflux disease)     takes tums occasionaly   • GERD (gastroesophageal reflux disease)    • Hyperlipidemia    • Viral intestinal infection      Social History     Socioeconomic History   • Marital status: /Civil Union     Spouse name: Not on file   • Number of children: Not on file   • Years of education: Not on file   • Highest education level: Not on file   Occupational History   • Not on file   Tobacco Use   • Smoking status: Never   • Smokeless tobacco: Never   Vaping Use   • Vaping status: Never Used   Substance and Sexual Activity   • Alcohol use: Yes     Alcohol/week: 1.0 standard drink of alcohol     Types: 1 Cans of beer per week     Comment: weekly   • Drug use: No   • Sexual activity: Yes     Partners: Male   Other Topics Concern   • Not on file   Social History Narrative   • Not on file     Social Drivers of Health     Financial Resource Strain: Not on file   Food Insecurity: Not on file   Transportation Needs: Not on file   Physical Activity: Not on file   Stress: Not on file   Social Connections: Not on file   Intimate Partner Violence: Not on file   Housing Stability: Not on file     Family History   Problem Relation Age of Onset   • Endometrial cancer Mother 68   • COPD Mother    • Coronary artery disease  Father    • Heart disease Father    • No Known Problems Sister    • No Known Problems Sister    • Hypertension Brother    • No Known Problems Son    • No Known Problems Daughter    • No Known Problems Maternal Grandmother    • No Known Problems Maternal Grandfather    • No Known Problems Paternal Grandmother    • No Known Problems Paternal Grandfather    • No Known Problems Maternal Aunt    • No Known Problems Maternal Aunt    • No Known Problems Maternal Aunt    • No Known Problems Maternal Aunt    • No Known Problems Paternal Aunt      Past Surgical History:   Procedure Laterality Date   • CARPAL TUNNEL RELEASE Bilateral    • CHOLECYSTECTOMY  08/2011   • COLONOSCOPY     • COLONOSCOPY N/A 10/18/2018    Procedure: COLONOSCOPY;  Surgeon: Faisal Miller MD;  Location: [facility];  Service: Gastroenterology   • CYSTOSCOPY     • GA CMBND ANTERPOST COLPORRAPHY W/CYSTO N/A 5/23/2016    Procedure: COLPORRHAPHY ANTERIOR POSTERIOR WITH GRAFT; ENTEROCELE REPAIR;  Surgeon: Micha Bryant MD;  [facility];  Service: UroGynecology          • GA COLONOSCOPY FLX DX W/COLLJ SPEC WHEN PFRMD N/A 10/17/2018    Procedure: COLONOSCOPY;  Surgeon: Faisal Miller MD;  Location: [facility];  Service: Gastroenterology   • GA COLPOPEXY VAGINAL EXTRAPERITONEAL APPROACH N/A 5/23/2016    Procedure: COLPOPEXY VAGINAL EXTRAPERITONEAL (VEC);  Surgeon: Micha Bryant MD;  [facility];  Service: UroGynecology          • GA CYSTOURETHROSCOPY N/A 5/23/2016    Procedure: CYSTOSCOPY;  Surgeon: Micha Bryant MD;  [facility];  Service: UroGynecology          • GA SLING OPERATION STRESS INCONTINENCE N/A 5/23/2016    Procedure: INSERTION PUBOVAGINAL SLING ;  Surgeon: Micha Bryant MD;  [facility];  Service: UroGynecology          • TUBAL LIGATION         Current Outpatient Medications:   •  aspirin (ECOTRIN LOW STRENGTH) 81 mg EC tablet, Take 1 tablet (81 mg total) by mouth daily, Disp: 30 tablet, Rfl: 0  •  bisacodyl  "(DULCOLAX) 5 mg EC tablet, Take 1 tablet (5 mg total) by mouth daily as needed for constipation, Disp: 2 tablet, Rfl: 0  •  Calcium Carbonate-Vitamin D (CALCIUM 500/D PO), Take by mouth, Disp: , Rfl:   •  Cholecalciferol (VITAMIN D) 2000 units CAPS, Take by mouth, Disp: , Rfl:   •  docusate sodium (COLACE) 100 mg capsule, Take 100 mg by mouth in the morning, Disp: , Rfl:   •  famotidine (PEPCID) 20 mg tablet, Take 1 tablet (20 mg total) by mouth 2 (two) times a day, Disp: 180 tablet, Rfl: 1  •  Multiple Vitamins-Minerals (MULTIVITAL-M PO), Take by mouth, Disp: , Rfl:   •  rosuvastatin (CRESTOR) 40 MG tablet, Take 1 tablet (40 mg total) by mouth daily, Disp: 90 tablet, Rfl: 3  •  sertraline (ZOLOFT) 50 mg tablet, TAKE ONE TABLET BY MOUTH DAILY, Disp: 90 tablet, Rfl: 0  •  triamcinolone (KENALOG) 0.1 % cream, Apply topically 2 (two) times a day, Disp: 60 g, Rfl: 0  Allergies   Allergen Reactions   • Levaquin [Levofloxacin] Swelling and Other (See Comments)     Fluid in knee   • Quinolones Other (See Comments)     And levaquin     Patient Active Problem List   Diagnosis   • Anxiety   • Lichen sclerosus   • GERD (gastroesophageal reflux disease)   • Pure hypercholesterolemia   • Hiatal hernia   • Chronic idiopathic constipation   • History of colon polyps   • Overweight   • Osteopenia of lumbar spine       Objective     Vitals:    05/14/25 1639   BP: 96/60   BP Location: Right arm   Patient Position: Sitting   Cuff Size: Standard   Pulse: 70   SpO2: 98%   Weight: 74.6 kg (164 lb 8 oz)   Height: 5' 4\" (1.626 m)       Body mass index is 28.24 kg/m².    Physical Exam  Constitutional:       General: She is not in acute distress.     Appearance: Normal appearance. She is not toxic-appearing.   HENT:      Head: Normocephalic and atraumatic.      Right Ear: External ear normal.      Left Ear: External ear normal.     Eyes:      General: No scleral icterus.        Right eye: No discharge.         Left eye: No discharge.      " Conjunctiva/sclera: Conjunctivae normal.     Neck:      Vascular: No carotid bruit.     Cardiovascular:      Rate and Rhythm: Normal rate and regular rhythm.      Pulses: Normal pulses.      Heart sounds: Normal heart sounds. No murmur heard.     No gallop.   Pulmonary:      Effort: Pulmonary effort is normal. No respiratory distress.      Breath sounds: Normal breath sounds. No wheezing or rales.     Musculoskeletal:      Cervical back: Neck supple.      Right lower leg: No edema.      Left lower leg: No edema.     Skin:     General: Skin is warm and dry.      Capillary Refill: Capillary refill takes less than 2 seconds.     Neurological:      General: No focal deficit present.      Mental Status: She is alert and oriented to person, place, and time. Mental status is at baseline.      Motor: No weakness.      Gait: Gait normal.     Psychiatric:         Mood and Affect: Mood normal.         Behavior: Behavior normal.       Labs:  Lab Results   Component Value Date    K 4.0 11/04/2023    K 4.1 07/08/2021    K 4.2 11/03/2019     11/04/2023     07/08/2021     (H) 11/03/2019    CO2 27 11/04/2023    CO2 28 07/08/2021    CO2 27 11/03/2019    BUN 21 11/04/2023    BUN 18 07/08/2021    BUN 17 11/03/2019    CREATININE 0.81 11/04/2023    CREATININE 0.71 07/08/2021    CREATININE 0.78 11/03/2019    EGFR 75 11/04/2023    EGFR 90 07/08/2021    EGFR 81 11/03/2019    GLUC 92 07/08/2021    GLUC 87 12/28/2016    GLUC 89 05/05/2016    AST 21 11/04/2023    AST 23 07/08/2021    AST 18 11/03/2019    ALT 19 11/04/2023    ALT 32 07/08/2021    ALT 31 11/03/2019    TBILI 0.95 11/04/2023    TBILI 0.65 07/08/2021    TBILI 0.63 11/03/2019    ALB 4.3 11/04/2023    ALB 3.6 07/08/2021    ALB 3.7 11/03/2019    CALCIUM 9.8 11/04/2023    CALCIUM 9.4 07/08/2021    CALCIUM 9.6 11/03/2019      Lab Results   Component Value Date    WBC 6.71 11/04/2023    WBC 6.90 07/08/2021    WBC 6.21 10/16/2018    HGB 14.8 11/04/2023    HGB 14.0  "07/08/2021    HGB 14.2 10/16/2018    HCT 44.3 11/04/2023    HCT 42.8 07/08/2021    HCT 43.0 10/16/2018     11/04/2023     07/08/2021     10/16/2018      Lab Results   Component Value Date    CHOLESTEROL 166 08/14/2024    CHOLESTEROL 178 04/09/2024    CHOLESTEROL 172 11/04/2023    TRIG 65 08/14/2024    TRIG 58 04/09/2024    TRIG 49 11/04/2023    TRIG 41 07/02/2015    TRIG 38 07/24/2014    HDL 65 08/14/2024    HDL 65 04/09/2024    HDL 66 11/04/2023    HDL 74 07/02/2015    HDL 82 07/24/2014    LDLCALC 88 08/14/2024    LDLCALC 101 (H) 04/09/2024    LDLCALC 96 11/04/2023    LDLCALC 146 (H) 07/02/2015    LDLCALC 123 (H) 07/24/2014     Lab Results   Component Value Date    HGBA1C 5.9 (H) 08/14/2024    HGBA1C 5.7 (H) 04/09/2024    HGBA1C 5.6 03/31/2023    HGBA1C 5.6 07/02/2015    GLUF 93 11/04/2023    GLUF 94 11/03/2019    GLUF 81 10/16/2018    GLUF 99 07/24/2014    POCGLU 101 06/23/2024     No results found for: \"TSH\", \"FT4\"  No results found for: \"HSTNI0\", \"HSTNI2\", \"HSTNI4\", \"TROPONINI\"  No results found for: \"BNP\", \"NTBNP\"     Imaging:  I have personally reviewed pertinent reports.  No results found.    Cardiac Studies:  EKG:   Date: 08/14/24, normal sinus rhythm  Date: 04/25/23, normal sinus rhythm    Tele:   No results found for this or any recent visit.     Holter:   No results found for this or any previous visit.    Recent Device Check:  No results found for this or any previous visit.    Previous EPS/Interventions:  No results found for this or any previous visit.    Previous CCS/CCTA:  CT CORONARY ARTERY CALCIUM SCREENING WITHOUT INTRAVENOUS CONTRAST  STUDY DATE: 11/22/20  FINDINGS:   Coronary artery calcium score breakdown is as follows:  Left main coronary artery:  18  Left anterior descending coronary artery and diagonal branches:  104  Left circumflex coronary artery and left marginal branches:  13  Right coronary artery and right marginal branches:  0  Posterior descending coronary " artery: 0  TOTAL coronary calcium score:  135  IMPRESSION:  Total coronary calcium score equals 135.     Previous Cath/PCI:  No results found for this or any previous visit.    Previous STRESS TEST:  Results for testing completed on the encounter of 05/09/23  Study: Echo stress test, exercise  Interpreted Summary  •  Left Ventricle: The left ventricular ejection fraction is 60%. Systolic function is normal. Wall motion is normal.  •  Stress ECG: No ST deviation is noted. The ECG was negative for ischemia. The stress ECG is negative for ischemia after maximal exercise.  •  Post Stress Echo: Left ventricle cavity has normal reduction in size post-stress. The left ventricle systolic function is normal post-stress with an EF of 70 %.  •  Echo Post Impression: This study shows a low prognostic risk. The study is normal, after maximal exercise.    ECHO:  No results found for this or any previous visit.    PAULINA:  No results found for this or any previous visit.    CMR:  No results found for this or any previous visit.    Counseling / Coordination of Care:  During our visit, I spent 20 minutes with the patient, and greater than 60% of this time was spent on counseling and coordination of care, including addressing diagnostic results, prognosis, risks and benefits of treatment options, instructions for management, patient/family education, importance of treatment compliance, along with risk factor reductions.    Dictation Disclaimer:  This note was completed in part utilizing [employer] direct voice recognition software. Grammatical errors, random word insertion, spelling mistakes, and incomplete sentences may be an occasional consequence of the system secondary to software limitations, ambient noise and hardware issues. At the time of dictation, efforts were made to edit, clarify and /or correct errors.  Please read the chart carefully and recognize, using context, where substitutions have occurred.  If you have any  questions or concerns about the context, text or information contained within the body of this dictation, please contact myself, the provider, for further clarification.     Lachelle Marquez,  05/14/25

## 2025-05-15 ENCOUNTER — NURSE TRIAGE (OUTPATIENT)
Dept: INTERNAL MEDICINE CLINIC | Facility: CLINIC | Age: 70
End: 2025-05-15

## 2025-05-15 NOTE — TELEPHONE ENCOUNTER
Pt called to change appt time to an earlier time for nurse visit, please order shingrix vax for pt ahead of appt on 5/23/25. Pt has [facility] ins.

## 2025-05-15 NOTE — TELEPHONE ENCOUNTER
----- Message from Mary Kay ACOSTA sent at 5/15/2025  9:44 AM EDT -----  Patient was instructed to schedule appt for the shingles vaccine on a day when she had the weekend off, she has just scheduled an appointment for next Friday, she wants the provider to be aware

## 2025-05-21 ENCOUNTER — SOCIAL WORK (OUTPATIENT)
Dept: BEHAVIORAL/MENTAL HEALTH CLINIC | Facility: CLINIC | Age: 70
End: 2025-05-21
Payer: COMMERCIAL

## 2025-05-21 DIAGNOSIS — F41.9 ANXIETY: Primary | ICD-10-CM

## 2025-05-21 PROCEDURE — 90834 PSYTX W PT 45 MINUTES: CPT | Performed by: SOCIAL WORKER

## 2025-05-21 NOTE — PSYCH
"Behavioral Health Psychotherapy Progress Note    Psychotherapy Provided: Individual Psychotherapy     Encounter Diagnoses   Name Primary?    Anxiety Yes                 Goals addressed in session: Goal 1     DATA: Phoebe spoke today about her feelings re: both her excitement and stress re: the impending birth of her first grandchild.  She provided several examples of ways that she has begun to implement the \"let them\" mindset to her interactions, verbalizations with loved ones as her realization of the limitations of her control of others becomes more pronounced.   Stage of change for addressing substance use diagnoses: No substance use/Not applicable    ASSESSMENT:  Phoebe Colbert presents with a Euthymic/ normal mood. Demi presented as considerably less anxious today and proud of herself for the improved boundaries that she has established for herself. her affect is Normal range and intensity, which is congruent, with her mood and the content of the session. The client has made progress on their goals.     Phoebe Colbert presents with a minimal risk of suicide, minimal risk of self-harm, and none risk of harm to others.    For any risk assessment that surpasses a \"low\" rating, a safety plan must be developed.    A safety plan was indicated: no  If yes, describe in detail     PLAN: Between sessions, Phoebe Colbert will re-read the \"let them\" book.  . At the next session, the therapist will use Supportive Psychotherapy to address the above in further detail.    Behavioral Health Treatment Plan and Discharge Planning: Phoebe Colbert is aware of and agrees to continue to work on their treatment plan. They have identified and are working toward their discharge goals. yes    Visit start and stop times:        05/21/25  Start Time: 1500  Stop Time: 1550  Total Visit Time: 50 minutesTreatment Plan Tracking    # 1Treatment Plan not completed within required time limits due to: Phoebe Colbert presented with " emotional/behavioral issues that required clinical intervention.

## 2025-05-21 NOTE — BH TREATMENT PLAN
"Outpatient Behavioral Health Psychotherapy Treatment Plan    Phoebe Georgeelidia  1955     Date of Initial Psychotherapy Assessment:   Date of Current Treatment Plan: 5/21/25  Treatment Plan Target Date: 11/21/25  Treatment Plan Expiration Date: 11/2125    Diagnosis:   1. Anxiety                Area(s) of Need: I benefit from having my therapist as a outlet, particularly as a place to receive support with my 's mental health struggles     Long Term Goal 1 (in the client's own words): I want to continue to grow.   Stage of Change: Action    Target Date for completion: 11/2125     Anticipated therapeutic modalities: Supportive Psychotherapy     People identified to complete this goal: Rachele Nair       Objective 1: (identify the means of measuring success in meeting the objective): I will continue to review and practice the \"let them\" mindset.       Objective 2: (identify the means of measuring success in meeting the objective): I will maintain my boundaries particularly when my loved ones are struggling as I practice the above.      I am currently under the care of a [facility] psychiatric provider: no    My [facility] psychiatric provider is:     I am currently taking psychiatric medications: No    I feel that I will be ready for discharge from mental health care when I reach the following (measurable goal/objective): When I feel guaranteed that my anxiety will not overwhelm me.        I have created my Crisis Plan and have been offered a copy of this plan    Behavioral Health Treatment Plan [facility]: Diagnosis and Treatment Plan explained to Phoebe Simmsara Filemon acknowledges an understanding of their diagnosis. Phoebe Colbert agrees to this treatment plan.    I have been offered a copy of this Treatment Plan. yes    Phoebe Colbert, 1955, actively participated in the review and update of this treatment plan Phoebe Filemon  provided verbal consent on 5/22/2025 at 4 PM. The treatment plan was " transcribed into the Electronic Health Record at a later time.

## 2025-05-23 ENCOUNTER — CLINICAL SUPPORT (OUTPATIENT)
Dept: INTERNAL MEDICINE CLINIC | Facility: CLINIC | Age: 70
End: 2025-05-23

## 2025-05-23 DIAGNOSIS — Z23 ENCOUNTER FOR IMMUNIZATION: Primary | ICD-10-CM

## 2025-06-07 ENCOUNTER — APPOINTMENT (OUTPATIENT)
Dept: LAB | Facility: CLINIC | Age: 70
End: 2025-06-07
Attending: PREVENTIVE MEDICINE

## 2025-06-07 DIAGNOSIS — Z00.8 HEALTH EXAMINATION IN POPULATION SURVEY: ICD-10-CM

## 2025-06-07 LAB
CHOLEST SERPL-MCNC: 146 MG/DL (ref ?–200)
EST. AVERAGE GLUCOSE BLD GHB EST-MCNC: 123 MG/DL
HBA1C MFR BLD: 5.9 %
HDLC SERPL-MCNC: 70 MG/DL
LDLC SERPL CALC-MCNC: 64 MG/DL (ref 0–100)
NONHDLC SERPL-MCNC: 76 MG/DL
TRIGL SERPL-MCNC: 59 MG/DL (ref ?–150)

## 2025-06-07 PROCEDURE — 80061 LIPID PANEL: CPT

## 2025-06-07 PROCEDURE — 83036 HEMOGLOBIN GLYCOSYLATED A1C: CPT

## 2025-06-07 PROCEDURE — 36415 COLL VENOUS BLD VENIPUNCTURE: CPT

## 2025-06-25 ENCOUNTER — TELEPHONE (OUTPATIENT)
Age: 70
End: 2025-06-25

## 2025-06-25 NOTE — TELEPHONE ENCOUNTER
Patient is calling regarding cancelling an appointment.    Date/Time: 6/25 @3    Reason: Daughter is in labor with their first grandchild    Patient was rescheduled: YES [x] NO []  If yes, when was Patient reschedule for: 7/23 @4    Patient requesting call back to reschedule: YES [] NO [x]

## 2025-07-20 DIAGNOSIS — F32.A DEPRESSION, UNSPECIFIED DEPRESSION TYPE: ICD-10-CM

## 2025-07-23 ENCOUNTER — SOCIAL WORK (OUTPATIENT)
Dept: BEHAVIORAL/MENTAL HEALTH CLINIC | Facility: CLINIC | Age: 70
End: 2025-07-23
Payer: COMMERCIAL

## 2025-07-23 DIAGNOSIS — F41.9 ANXIETY: Primary | ICD-10-CM

## 2025-07-23 PROCEDURE — 90834 PSYTX W PT 45 MINUTES: CPT | Performed by: SOCIAL WORKER

## 2025-07-23 NOTE — PSYCH
"Behavioral Health Psychotherapy Progress Note    Psychotherapy Provided: Individual Psychotherapy     Encounter Diagnoses   Name Primary?   • Anxiety Yes                   Goals addressed in session: Goal 1     DATA: Phoebe spoke today about her feelings re: the manjula of spending time daily with her johnathan since her birth one month ago and sadness that her  has not \"done what was requested of him\" to be able to have the same.   Demi also expressed concern for a severely depressed friend.  Stage of change for addressing substance use diagnoses: No substance use/Not applicable    ASSESSMENT:  Phoebe Colbert presents with a Euthymic/ normal mood. Demi again presented as considerably less anxious today and proud of herself for the changes that she has made in her life.  her affect is Normal range and intensity, which is congruent, with her mood and the content of the session. The client has made progress on their goals.     Phoebe Colbert presents with a minimal risk of suicide, minimal risk of self-harm, and none risk of harm to others.    For any risk assessment that surpasses a \"low\" rating, a safety plan must be developed.    A safety plan was indicated: no  If yes, describe in detail     PLAN: Between sessions, Phoebe Colbert will re-read the \"let them\" book.  . At the next session, the therapist will use Supportive Psychotherapy to address the above in further detail.    Behavioral Health Treatment Plan and Discharge Planning: Phoebe Colbert is aware of and agrees to continue to work on their treatment plan. They have identified and are working toward their discharge goals. yes    Visit start and stop times:      7/23/25                    "

## 2025-07-28 DIAGNOSIS — F32.A DEPRESSION, UNSPECIFIED DEPRESSION TYPE: ICD-10-CM

## 2025-07-28 DIAGNOSIS — E78.5 HYPERLIPIDEMIA, UNSPECIFIED HYPERLIPIDEMIA TYPE: ICD-10-CM

## 2025-07-29 ENCOUNTER — TELEPHONE (OUTPATIENT)
Age: 70
End: 2025-07-29

## 2025-07-29 DIAGNOSIS — F32.A DEPRESSION, UNSPECIFIED DEPRESSION TYPE: ICD-10-CM

## 2025-07-30 RX ORDER — ROSUVASTATIN CALCIUM 40 MG/1
40 TABLET, COATED ORAL DAILY
Qty: 90 TABLET | Refills: 0 | Status: SHIPPED | OUTPATIENT
Start: 2025-07-30 | End: 2026-07-25

## 2025-08-04 RX ORDER — ROSUVASTATIN CALCIUM 40 MG/1
40 TABLET, COATED ORAL DAILY
Qty: 90 TABLET | Refills: 3 | Status: SHIPPED | OUTPATIENT
Start: 2025-08-04 | End: 2026-07-30

## 2025-08-06 ENCOUNTER — OFFICE VISIT (OUTPATIENT)
Dept: INTERNAL MEDICINE CLINIC | Facility: CLINIC | Age: 70
End: 2025-08-06
Payer: COMMERCIAL

## 2025-08-06 VITALS
HEART RATE: 63 BPM | TEMPERATURE: 97.7 F | DIASTOLIC BLOOD PRESSURE: 74 MMHG | HEIGHT: 64 IN | OXYGEN SATURATION: 96 % | WEIGHT: 161.8 LBS | BODY MASS INDEX: 27.62 KG/M2 | SYSTOLIC BLOOD PRESSURE: 110 MMHG

## 2025-08-06 DIAGNOSIS — R73.01 IMPAIRED FASTING BLOOD SUGAR: Primary | ICD-10-CM

## 2025-08-06 DIAGNOSIS — Z00.00 WELL ADULT EXAM: ICD-10-CM

## 2025-08-06 DIAGNOSIS — E66.09 CLASS 1 OBESITY DUE TO EXCESS CALORIES WITHOUT SERIOUS COMORBIDITY WITH BODY MASS INDEX (BMI) OF 30.0 TO 30.9 IN ADULT: ICD-10-CM

## 2025-08-06 DIAGNOSIS — E78.00 PURE HYPERCHOLESTEROLEMIA: ICD-10-CM

## 2025-08-06 DIAGNOSIS — E66.811 CLASS 1 OBESITY DUE TO EXCESS CALORIES WITHOUT SERIOUS COMORBIDITY WITH BODY MASS INDEX (BMI) OF 30.0 TO 30.9 IN ADULT: ICD-10-CM

## 2025-08-06 DIAGNOSIS — Z12.31 ENCOUNTER FOR SCREENING MAMMOGRAM FOR BREAST CANCER: ICD-10-CM

## 2025-08-06 PROCEDURE — 99214 OFFICE O/P EST MOD 30 MIN: CPT | Performed by: INTERNAL MEDICINE

## 2025-08-06 RX ORDER — TIRZEPATIDE 2.5 MG/.5ML
2.5 INJECTION, SOLUTION SUBCUTANEOUS WEEKLY
Qty: 2 ML | Refills: 0 | Status: SHIPPED | OUTPATIENT
Start: 2025-08-06 | End: 2025-09-03

## 2025-08-07 ENCOUNTER — TELEPHONE (OUTPATIENT)
Age: 70
End: 2025-08-07

## 2025-08-15 ENCOUNTER — CLINICAL SUPPORT (OUTPATIENT)
Dept: INTERNAL MEDICINE CLINIC | Facility: CLINIC | Age: 70
End: 2025-08-15
Payer: COMMERCIAL